# Patient Record
Sex: FEMALE | Race: WHITE | NOT HISPANIC OR LATINO | Employment: OTHER | URBAN - METROPOLITAN AREA
[De-identification: names, ages, dates, MRNs, and addresses within clinical notes are randomized per-mention and may not be internally consistent; named-entity substitution may affect disease eponyms.]

---

## 2017-02-10 DIAGNOSIS — I10 ESSENTIAL HYPERTENSION: ICD-10-CM

## 2017-02-10 RX ORDER — AMLODIPINE BESYLATE 5 MG/1
TABLET ORAL
Qty: 30 TABLET | Refills: 3 | Status: SHIPPED | OUTPATIENT
Start: 2017-02-10 | End: 2017-06-11 | Stop reason: SDUPTHER

## 2017-06-11 DIAGNOSIS — I10 ESSENTIAL HYPERTENSION: ICD-10-CM

## 2017-06-12 RX ORDER — LISINOPRIL AND HYDROCHLOROTHIAZIDE 12.5; 2 MG/1; MG/1
1 TABLET ORAL EVERY MORNING
Qty: 30 TABLET | Refills: 2 | Status: SHIPPED | OUTPATIENT
Start: 2017-06-12 | End: 2017-07-31 | Stop reason: SDUPTHER

## 2017-06-12 RX ORDER — AMLODIPINE BESYLATE 5 MG/1
TABLET ORAL
Qty: 30 TABLET | Refills: 2 | Status: SHIPPED | OUTPATIENT
Start: 2017-06-12 | End: 2017-07-31 | Stop reason: SDUPTHER

## 2017-07-31 ENCOUNTER — OFFICE VISIT (OUTPATIENT)
Dept: INTERNAL MEDICINE | Facility: CLINIC | Age: 52
End: 2017-07-31
Payer: COMMERCIAL

## 2017-07-31 VITALS
HEART RATE: 63 BPM | BODY MASS INDEX: 32.94 KG/M2 | DIASTOLIC BLOOD PRESSURE: 66 MMHG | OXYGEN SATURATION: 96 % | HEIGHT: 59 IN | SYSTOLIC BLOOD PRESSURE: 118 MMHG | WEIGHT: 163.38 LBS

## 2017-07-31 DIAGNOSIS — Z23 NEED FOR DIPHTHERIA-TETANUS-PERTUSSIS (TDAP) VACCINE: ICD-10-CM

## 2017-07-31 DIAGNOSIS — Z00.00 PREVENTATIVE HEALTH CARE: ICD-10-CM

## 2017-07-31 DIAGNOSIS — I10 ESSENTIAL HYPERTENSION: Primary | ICD-10-CM

## 2017-07-31 DIAGNOSIS — E66.9 NON MORBID OBESITY, UNSPECIFIED OBESITY TYPE: ICD-10-CM

## 2017-07-31 PROCEDURE — 99999 PR PBB SHADOW E&M-EST. PATIENT-LVL III: CPT | Mod: PBBFAC,,, | Performed by: INTERNAL MEDICINE

## 2017-07-31 PROCEDURE — 90471 IMMUNIZATION ADMIN: CPT | Mod: S$GLB,,, | Performed by: INTERNAL MEDICINE

## 2017-07-31 PROCEDURE — 90715 TDAP VACCINE 7 YRS/> IM: CPT | Mod: S$GLB,,, | Performed by: INTERNAL MEDICINE

## 2017-07-31 PROCEDURE — 99396 PREV VISIT EST AGE 40-64: CPT | Mod: 25,S$GLB,, | Performed by: INTERNAL MEDICINE

## 2017-07-31 RX ORDER — AMLODIPINE BESYLATE 5 MG/1
TABLET ORAL
Qty: 30 TABLET | Refills: 6 | Status: SHIPPED | OUTPATIENT
Start: 2017-07-31 | End: 2018-01-26 | Stop reason: SDUPTHER

## 2017-07-31 RX ORDER — LISINOPRIL AND HYDROCHLOROTHIAZIDE 12.5; 2 MG/1; MG/1
1 TABLET ORAL EVERY MORNING
Qty: 30 TABLET | Refills: 6 | Status: SHIPPED | OUTPATIENT
Start: 2017-07-31 | End: 2018-01-26 | Stop reason: SDUPTHER

## 2017-07-31 NOTE — PROGRESS NOTES
Subjective:       Patient ID: Natasha Espinal is a 51 y.o. female.    Chief Complaint: Hypertension (f/u)    HPI Pt. Here for f/u for HTN; she is compliant with meds; she would like tetanus shot; colonoscopy was 4 years ago as per pt. And does not want repeat at this time; she states she will research date of colonoscopy and recommended repeat interval and let me know; she needs refill on BP meds; weight is elevated but stable  Review of Systems   Constitutional: Negative for chills, fatigue and fever.   HENT: Negative for congestion, rhinorrhea and sore throat.    Respiratory: Negative for cough, shortness of breath and wheezing.    Cardiovascular: Negative for chest pain.   Gastrointestinal: Negative for abdominal pain, nausea and vomiting.   Genitourinary: Negative for dysuria.   Musculoskeletal: Negative for arthralgias.   Skin: Negative for rash.   Neurological: Negative for dizziness and headaches.   Psychiatric/Behavioral: Negative for sleep disturbance. The patient is not nervous/anxious.        Objective:      Physical Exam   Constitutional: She is oriented to person, place, and time.   obese   Eyes: EOM are normal.   Neck: Normal range of motion.   Cardiovascular: Normal rate, regular rhythm and normal heart sounds.    Pulmonary/Chest: Effort normal and breath sounds normal.   Abdominal: Soft. There is no tenderness. There is no rebound and no guarding.   Musculoskeletal: Normal range of motion.   Neurological: She is alert and oriented to person, place, and time.   Skin: No rash noted.       Assessment:       1. Essential hypertension Well controlled   2. Non morbid obesity, unspecified obesity type Sub-optimally controlled   3. Preventative health care Active   4. Need for diphtheria-tetanus-pertussis (Tdap) vaccine        Plan:         Essential hypertension  Comments:  continue current regimen and encouraged low Na diet and weight loss  Orders:  -     amlodipine (NORVASC) 5 MG tablet; TAKE 1 TABLET  (5 MG TOTAL) BY MOUTH EVERY EVENING.  Dispense: 30 tablet; Refill: 6  -     lisinopril-hydrochlorothiazide (PRINZIDE,ZESTORETIC) 20-12.5 mg per tablet; Take 1 tablet by mouth every morning.  Dispense: 30 tablet; Refill: 6  -     CBC auto differential; Future; Expected date: 12/31/2017  -     Comprehensive metabolic panel; Future; Expected date: 12/31/2017  -     Urinalysis; Future; Expected date: 12/31/2017    Non morbid obesity, unspecified obesity type  Comments:  encouraged diet and explained risks     Preventative health care  -     CBC auto differential; Future; Expected date: 12/31/2017  -     Comprehensive metabolic panel; Future; Expected date: 12/31/2017  -     Lipid panel; Future; Expected date: 12/31/2017  -     TSH; Future; Expected date: 12/31/2017  -     Urinalysis; Future; Expected date: 12/31/2017    Need for diphtheria-tetanus-pertussis (Tdap) vaccine  -     (In Office Administered) Tdap Vaccine

## 2017-08-21 DIAGNOSIS — Z12.11 COLON CANCER SCREENING: ICD-10-CM

## 2017-12-06 ENCOUNTER — TELEPHONE (OUTPATIENT)
Dept: RADIOLOGY | Facility: HOSPITAL | Age: 52
End: 2017-12-06

## 2017-12-06 ENCOUNTER — PATIENT MESSAGE (OUTPATIENT)
Dept: RADIOLOGY | Facility: HOSPITAL | Age: 52
End: 2017-12-06

## 2017-12-06 NOTE — TELEPHONE ENCOUNTER
Called patient in reference to her mammogram and scheduling patient for a breast biopsy here at the breast center. No answer. Left the patient a message with my direct phone number.

## 2017-12-06 NOTE — TELEPHONE ENCOUNTER
Spoke with patient. Reviewed breast biopsy procedure and reviewed instructions for breast biopsy. Patient expressed understanding and all questions were answered. Provided patient with my phone number to call for any further concerns or questions.   Patient scheduled breast biopsy at the [facility] [place] on 12/14/17

## 2017-12-14 ENCOUNTER — HOSPITAL ENCOUNTER (OUTPATIENT)
Dept: RADIOLOGY | Facility: HOSPITAL | Age: 52
Discharge: HOME | End: 2017-12-14
Attending: OBSTETRICS & GYNECOLOGY
Payer: COMMERCIAL

## 2017-12-14 DIAGNOSIS — N63.20 LEFT BREAST LUMP: ICD-10-CM

## 2017-12-14 DIAGNOSIS — R92.8 ABNORMAL MAMMOGRAM: ICD-10-CM

## 2017-12-14 PROCEDURE — 77062 BREAST TOMOSYNTHESIS BI: CPT | Mod: 26,,, | Performed by: RADIOLOGY

## 2017-12-14 PROCEDURE — 76642 ULTRASOUND BREAST LIMITED: CPT | Mod: 26,50,, | Performed by: RADIOLOGY

## 2017-12-14 PROCEDURE — 77066 DX MAMMO INCL CAD BI: CPT | Mod: 26,,, | Performed by: RADIOLOGY

## 2017-12-14 PROCEDURE — 77066 DX MAMMO INCL CAD BI: CPT | Mod: TC,PO

## 2017-12-14 PROCEDURE — 76642 ULTRASOUND BREAST LIMITED: CPT | Mod: TC,50,PO

## 2017-12-14 PROCEDURE — 27201068 US BREAST BIOPSY WITH IMAGING 1ST SITE LEFT: Mod: PO

## 2017-12-14 PROCEDURE — 88305 TISSUE EXAM BY PATHOLOGIST: CPT | Performed by: PATHOLOGY

## 2017-12-14 PROCEDURE — 88360 TUMOR IMMUNOHISTOCHEM/MANUAL: CPT | Mod: 26,,, | Performed by: PATHOLOGY

## 2017-12-14 PROCEDURE — 19083 BX BREAST 1ST LESION US IMAG: CPT | Mod: ,,, | Performed by: RADIOLOGY

## 2017-12-19 ENCOUNTER — TELEPHONE (OUTPATIENT)
Dept: SURGERY | Facility: CLINIC | Age: 52
End: 2017-12-19

## 2017-12-19 ENCOUNTER — TELEPHONE (OUTPATIENT)
Dept: HEMATOLOGY/ONCOLOGY | Facility: CLINIC | Age: 52
End: 2017-12-19

## 2017-12-19 NOTE — TELEPHONE ENCOUNTER
Returned call from patient, who had left me a voicemail. Discussed newly diagnosed breast cancer, and that the next step would be to meet with a breast surgeon. Patient would like to continue her care here at [facility]. She is requesting Dr. Emeterio Raymundo. Scheduled for 12/28 at 9:15 per patient request. Reviewed location of the [facility], encouraged patient to call if she needs anything prior to that appointment.

## 2017-12-19 NOTE — TELEPHONE ENCOUNTER
Called Dr. Tuttle's office to relay biopsy results showing invasive ductal carcinoma. Spoke to his nurse Fatimah, who stated that Dr. Tuttle will call patient and inform her of the diagnosis. Faxed pathology report, gave Fatimah my number and encouraged her to call if Dr. Tuttle wants her to be seen by any of our breast surgeons

## 2017-12-28 ENCOUNTER — OFFICE VISIT (OUTPATIENT)
Dept: SURGERY | Facility: CLINIC | Age: 52
End: 2017-12-28
Payer: COMMERCIAL

## 2017-12-28 VITALS
HEART RATE: 92 BPM | SYSTOLIC BLOOD PRESSURE: 143 MMHG | WEIGHT: 163 LBS | BODY MASS INDEX: 32.86 KG/M2 | DIASTOLIC BLOOD PRESSURE: 77 MMHG | TEMPERATURE: 98 F | HEIGHT: 59 IN

## 2017-12-28 DIAGNOSIS — Z17.0 MALIGNANT NEOPLASM OF UPPER-OUTER QUADRANT OF LEFT BREAST IN FEMALE, ESTROGEN RECEPTOR POSITIVE: Primary | ICD-10-CM

## 2017-12-28 DIAGNOSIS — Z17.0 MALIGNANT NEOPLASM OF OVERLAPPING SITES OF LEFT BREAST IN FEMALE, ESTROGEN RECEPTOR POSITIVE: Primary | ICD-10-CM

## 2017-12-28 DIAGNOSIS — C50.412 MALIGNANT NEOPLASM OF UPPER-OUTER QUADRANT OF LEFT BREAST IN FEMALE, ESTROGEN RECEPTOR POSITIVE: Primary | ICD-10-CM

## 2017-12-28 DIAGNOSIS — C50.812 MALIGNANT NEOPLASM OF OVERLAPPING SITES OF LEFT BREAST IN FEMALE, ESTROGEN RECEPTOR POSITIVE: Primary | ICD-10-CM

## 2017-12-28 PROCEDURE — 99245 OFF/OP CONSLTJ NEW/EST HI 55: CPT | Mod: S$GLB,,, | Performed by: SURGERY

## 2017-12-28 PROCEDURE — 99999 PR PBB SHADOW E&M-EST. PATIENT-LVL III: CPT | Mod: PBBFAC,,, | Performed by: SURGERY

## 2017-12-28 NOTE — LETTER
December 31, 2017      Darrell Tuttle MD  [address] [address]           [facility]  [address]  [facility] [address]  Phone: [phone]  Fax: [phone]          Patient: Natasha Espinal   MR Number: [id number]   YOB: 1965   Date of Visit: 12/28/2017       Dear Dr. Darrell Tuttle:    Thank you for referring Natasha Espinal to me for evaluation. Attached you will find relevant portions of my assessment and plan of care.    If you have questions, please do not hesitate to call me. I look forward to following Natasha Espinal along with you.    Sincerely,    Emeterio Raymundo MD    Enclosure  CC:  No Recipients    If you would like to receive this communication electronically, please contact [email or web address] or [phone] to request more information on [facility] access.    For providers and/or their staff who would like to refer a patient to [facility], please contact us through our one-stop-shop provider referral line, [facility], at [phone].    If you feel you have received this communication in error or would no longer like to receive these types of communications, please e-mail [email or web address]

## 2017-12-28 NOTE — PROGRESS NOTES
"New Breast Cancer  History and Physical  [facility]  Department of Surgery    REFERRING PROVIDER: Darrell Tuttle MD  [address] [facility]  [address] [place] [address]    CHIEF COMPLAINT: left breast cancer    Subjective:      Natasha Espinal is a 52 y.o. postmenopausal female referred for evaluation of recently diagnosed carcinoma of the left breast. The patient was initially referred for surgical evaluation of a breast lump on exam first noted late september. Follow-up mammogram and ultrasound (17) showed a 29 mm irregularly shaped mass seen in the upper outer quadrant of the left breast. BI-RADS 4. A ultrasound guided biopsy was performed on 17 with pathology revealing infiltrating ductal carcinoma of the breast.     Patient does not routinely do self breast exams.  Patient has noted a change on breast exam.  Patient denies nipple discharge. Patient denies to previous breast biopsy. Patient denies a personal history of breast cancer.  She reports having a lumpectomy at 17 year old to remove "scar tissue" that was a result from an accident. Pathology was reportedly benign.     Findings at that time were the following:   Tumor size: 3.2 cm   Tumor ndgndrndanddndend:nd nd2nd Estrogen Receptor: positive  Progesterone Receptor: positive  Her-2 tahir: FISH pending  Lymph node status: Clinically negative  Lymphatic invasion: N/A    GYN History:  Age of menarche was 15. Age of menopause was 46 via KLAUS BSO. Patient admits to hormonal therapy (ongoing since ). Patient is . Age of first live birth was 29. Patient did not breast feed.    FAMILY History:  PAunt: breast dx 40s  PAunt: breast dx 40s  PCousin: breast  40s  Paternal second cousin: breast 60s    Past Medical History:   Diagnosis Date    Hypertension      Past Surgical History:   Procedure Laterality Date    BREAST LUMPECTOMY      HYSTERECTOMY       Current Outpatient Prescriptions on File Prior to Visit   Medication Sig " "Dispense Refill    amlodipine (NORVASC) 5 MG tablet TAKE 1 TABLET (5 MG TOTAL) BY MOUTH EVERY EVENING. 30 tablet 6    estradiol (ESTRACE) 1 MG tablet Take 1 mg by mouth once daily.      lisinopril-hydrochlorothiazide (PRINZIDE,ZESTORETIC) 20-12.5 mg per tablet Take 1 tablet by mouth every morning. 30 tablet 6     No current facility-administered medications on file prior to visit.      Social History     Social History    Marital status:      Spouse name: N/A    Number of children: N/A    Years of education: N/A     Occupational History    Not on file.     Social History Main Topics    Smoking status: Never Smoker    Smokeless tobacco: Never Used    Alcohol use No    Drug use: No    Sexual activity: Not on file     Other Topics Concern    Not on file     Social History Narrative    No narrative on file     Family History   Problem Relation Age of Onset    Heart disease Father     Breast cancer Paternal Aunt     Breast cancer Paternal Aunt     Breast cancer Paternal Aunt         Review of Systems  Review of Systems   Constitutional: Negative.    HENT: Negative.    Respiratory: Negative.    Cardiovascular: Negative.    Gastrointestinal: Negative.    Neurological: Negative.    Psychiatric/Behavioral: Negative.         Objective:   PHYSICAL EXAM:  BP (!) 143/77 (BP Location: Right arm, Patient Position: Sitting, BP Method: Medium (Automatic))   Pulse 92   Temp 98 °F (36.7 °C) (Oral)   Ht 4' 11" (1.499 m)   Wt 73.9 kg (163 lb)   BMI 32.92 kg/m²     Physical Exam   Pulmonary/Chest: Right breast exhibits no inverted nipple, no mass, no nipple discharge, no skin change and no tenderness. Left breast exhibits mass. Left breast exhibits no inverted nipple, no nipple discharge, no skin change and no tenderness.             Radiology review: Images personally reviewed by me in the clinic.   Findings:   Computer-aided detection was utilized in the interpretation of this examination.  The breasts " are extremely dense, which lowers the sensitivity of mammography.      Left  Mammo Digital Diagnostic Bilat with CAD  There is architectural distortion seen in the upper outer quadrant of the left breast.      US Breast Left Limited  There is a 29 mm irregularly shaped mass seen in the upper outer quadrant of the left breast.      Right  Mammo Digital Diagnostic Bilat with CAD  There is no evidence of suspicious masses, calcifications, or other abnormal findings.     Impression:  Left  Architectural Distortion: Left breast architectural distortion at the upper outer position. Assessment: 4 - Suspicious finding. Biopsy is recommended.      Right  There is no mammographic or sonographic evidence of malignancy.     BI-RADS Category:   Overall: 4 - Suspicious      Assessment:      Natasha Espinal is a 52 y.o. postmenopausal female with recently diagnosed carcinoma of the left breast.      Plan:    Options for management were discussed with the patient and her family. We reviewed the existing data noting the equivalency of breast conserving surgery with radiation therapy and mastectomy. We also reviewed the guidelines of the National Comprehensive Cancer Network for Stage 2 breast carcinoma. We discussed the need for lumpectomy margins to be negative for carcinoma, the necessity for postoperative radiation therapy after breast conservation in most cases, the possibility of a failed or false negative sentinel lymph node biopsy and the potential need for complete lymphadenectomy for a failed or positive sentinel lymph node biopsy were fully discussed. In the setting of mastectomy, delayed or immediate reconstruction options are available and were discussed.     In the setting of lumpectomy, radiation therapy would be recommended majority of the time.  The duration and treatment side effects were discussed with the patient.  This will coordinated with the radiation oncologist pending final pathology.    We also  discussed the role of systemic therapy in the treatment of early stage breast cancer.  We discussed that this is based on tumor biology and mynor status and will be determined based on final pathology.  We discussed that if the cancer is hormone positive, endocrine therapy would be recommended in most cases and its use can reduce the risk of recurrence as well as improve survival. Side effects of treatment were briefly discussed. We also discussed the potential role for chemotherapy based on a number of factors such as tumor phenotype (ER+ vs. triple negative vs. Oep0yoa+) and this would be determined in coordination with the medical oncologist.    Patient was educated on breast cancer, receptors, wire localization lumpectomy, mastectomy, sentinel lymph node mapping and biopsy, axillary lymph node dissection, reconstruction, breast prosthesis with post-mastectomy bra and radiation therapy. Patient was given patient information binder including [facility] breast cancer treatment brochure.  All her questions were answered.  Discussed potential need for post mastectomy XRT.    We will await results of HER 2 to make a final recommendation. If HER2-, will try to obtain mammoprint   Will obtain bilateral breast MRI  Given strong family history, will refer to genetics  RTC on 1/9/17 to discuss results and finalize game plan moving forward    Total time spent with the patient: 60 minutes.  45 minutes of face to face consultation and 15 minutes of chart review and coordination of care.    I have personally taken the history and examined this patient and agree with the resident's note as stated above.    ADDENDUM:    Natasha Espinal is a very pleasant 52-year-old  female who   presents with her friend, Jessica, and her partner Momo for initial   consultation.  The patient initially felt a breast mass on self-breast   examination in the upper outer quadrant of the left breast.  She underwent   diagnostic workup as  noted above, which revealed approximately a 3-cm mass in   the upper outer 2 o'clock region of the left breast.  This was mobile without   fixation.  There were no suspicious skin changes and there was no fixation to   the chest wall.  There was no palpable lymphadenopathy.  On clinical breast   examination, the mass actually felt approximately 5 cm in size without   lymphadenopathy.  The core needle biopsy revealed that this was estrogen and   progesterone receptor positive.  It was a grade I tumor.  The HER-2/tahir status   is pending FISH analysis.  Her family history is as noted above with several   family members with a history of breast cancer.  Her breast size is a 38DD.    She presents today unsure about whether she wants breast conservation surgery or   mastectomy and she wanted to listen to all the options.  We discussed the   indications for post-lumpectomy radiation.  We discussed indications for   postmastectomy radiotherapy.  At this point, I do not think she is breast   conservation surgery appropriate given the size of the mass.  Certainly, we   would need to know the FISH status before making definitive recommendations.    Certainly, if her FISH reveals that this is HER-2/tahir positive, she will get   neoadjuvant preoperative primary upfront chemotherapy with Herceptin and Perjeta   based chemotherapy.  If this is HER-2/tahir negative, we would order a MammaPrint   for possibly Oncotype DX genomic analysis to see if this is someone who would   warrant and benefit from chemotherapy either neoadjuvantly or adjuvantly.  Given   the family history, we will refer her for a Genetics counseling appointment for   possible genetic testing given the multiple family members diagnosed at an   early age.  I have advised her to discontinue her hormone replacement therapy   and I have ordered a breast MRI to assess the size of this mass since clinically   it feels about 5 cm and on imaging, it was about 3 cm.    The  breast MRI has been ordered.  We have advised her to discontinue hormone   replacement therapy.  She will be set up for Genetics counseling for genetic   testing possibility based on the family history as noted above with our nurse   practitioner, Irene Ramires.  I will have to wait on ordering the MammaPrint   since we do not have the FISH result.  Certainly, if she is HER-2/tahir positive,   we will not need to order the MammaPrint and we typically cannot order it anyway   until we know the HER-2/tahir status.  At this point, she will follow up with me   on 01/09/2018, to discuss the results of her HER-2 result and hopefully at that   point, we will also have the breast MRI.  In the meantime, the patient will   discuss with her family and friends the options of breast conservation surgery   versus mastectomy.  Again, if she is highly motivated for breast conservation   surgery, I think we would need to use the neoadjuvant chemotherapy approach   given the clinical size of the mass, but at this point, she does not appear to   be leaning one way or the other and is unsure about breast conservation surgery   versus mastectomy.    Time spent with the patient today was greater than 60 minutes with greater than   50% of that time in counseling.            CODY/IN  dd: 12/31/2017 08:16:58 (CST)  td: 12/31/2017 09:59:18 (CST)  Doc ID   #[id number]  Job ID #[id number]    CC:     Job # [id number].

## 2017-12-28 NOTE — LETTER
[facility]  [address]  [facility] [address]  Phone: [phone]  Fax: [phone] December 31, 2017      Darrell Tuttle MD  [address] [address]    Patient: Natasha Espinal   MR Number: [id number]   YOB: 1965   Date of Visit: 12/28/2017     Dear Dr. Tuttle:    Thank you for referring Natasha Espinal to me for evaluation. Attached you will find relevant portions of my assessment and plan of care.    Natasha Espinal is a very pleasant 52-year-old  female who presents with her friend, Jessica, and her partner Momo for initial consultation.  The patient initially felt a breast mass on self-breast examination in the upper outer quadrant of the left breast.  She underwent diagnostic workup as noted above, which revealed approximately a 3-cm mass in the upper outer 2 o'clock region of the left breast.  This was mobile without fixation.  There were no suspicious skin changes and there was no fixation to the chest wall.  There was no palpable lymphadenopathy.      On clinical breast examination, the mass actually felt approximately 5 cm in size without lymphadenopathy.  The core needle biopsy revealed that this was estrogen and progesterone receptor positive.  It was a grade I tumor.  The HER-2/tahir status is pending FISH analysis.  Her family history, several family members with a history of breast cancer. Her breast size is a 38DD.    She presents today unsure about whether she wants breast conservation surgery or mastectomy and she wanted to listen to all the options.  We discussed the indications for post-lumpectomy radiation. We discussed indications for postmastectomy radiotherapy.  At this point, I do not think she is breast conservation surgery appropriate given the size of the mass.  Certainly, we would need to know the FISH status before making definitive recommendations.  Certainly, if her FISH reveals that this  is HER-2/tahir positive, she will get neoadjuvant preoperative primary upfront chemotherapy with Herceptin and Perjeta based chemotherapy.  If this is HER-2/tahir negative, we would order a MammaPrint for possibly Oncotype DX genomic analysis to see if this is someone who would warrant and benefit from chemotherapy either neoadjuvantly or adjuvantly. Given the family history, we will refer her for a Genetics counseling appointment for possible genetic testing given the multiple family members diagnosed at an early age.      I have advised her to discontinue her hormone replacement therapy and I have ordered a breast MRI to assess the size of this mass since clinically it feels about 5 cm and on imaging, it was about 3 cm.    The breast MRI has been ordered.  We have advised her to discontinue hormone replacement therapy.  She will be set up for Genetics counseling for genetic testing possibility based on the family history with our nurse practitioner, Irene Ramires.  I will have to wait on ordering the MammaPrint since we do not have the FISH result. Certainly, if she is HER-2/tahir positive, we will not need to order the MammaPrint and we typically cannot order it anyway until we know the HER-2/tahir status.      At this point, she will follow up with me on 01/09/2018, to discuss the results of her HER-2 result and hopefully at that point, we will also have the breast MRI.  In the meantime, the patient will discuss with her family and friends the options of breast conservation surgery versus mastectomy.  Again, if she is highly motivated for breast conservation surgery, I think we would need to use the neoadjuvant chemotherapy approach given the clinical size of the mass, but at this point, she does not appear to be leaning one way or the other and is unsure about breast conservation surgery versus mastectomy.    Time spent with the patient today was greater than 60 minutes with greater than 50% of that time in  counseling.    If you have questions, please do not hesitate to call me. I look forward to following Natasha Espinal along with you.    Sincerely,        Emeterio Raymundo MD  Medical Director, [facility]  Section of General and Oncologic Surgery  [facility]    RLC/hcr    CC  Bang Snowden MD

## 2018-01-03 ENCOUNTER — TELEPHONE (OUTPATIENT)
Dept: SURGERY | Facility: CLINIC | Age: 53
End: 2018-01-03

## 2018-01-03 NOTE — TELEPHONE ENCOUNTER
Reminder call to the patient regarding genetic counseling appointment.  The patient is scheduled to be seen on tomorrow Thursday 1/4/18 at 7:30 am.  The patient was instructed to fast 30 minutes before the appointment and to bring insurance card to the appointment.  The patient voiced understanding of appointment date, time, location, and instructions.

## 2018-01-04 ENCOUNTER — OFFICE VISIT (OUTPATIENT)
Dept: SURGERY | Facility: CLINIC | Age: 53
End: 2018-01-04
Payer: COMMERCIAL

## 2018-01-04 DIAGNOSIS — Z71.83 ENCOUNTER FOR NONPROCREATIVE GENETIC COUNSELING: Primary | ICD-10-CM

## 2018-01-04 DIAGNOSIS — C50.912 INVASIVE DUCTAL CARCINOMA OF BREAST, FEMALE, LEFT: ICD-10-CM

## 2018-01-04 DIAGNOSIS — Z80.3 FAMILY HISTORY OF BREAST CANCER: ICD-10-CM

## 2018-01-04 PROCEDURE — 99214 OFFICE O/P EST MOD 30 MIN: CPT | Mod: S$GLB,,, | Performed by: SURGERY

## 2018-01-04 NOTE — PROGRESS NOTES
Mrs Espinal presents for genetic counseling, referred by Dr Raymundo. She is a 52 year old female with recent diagnosis of IDC left breast, ER/WA +. See pedigree for full family history which will be scanned into Epic media.  This patient does not have a known hereditary cancer genetic mutation on either side of the family and does not have known Ashenazi Roman Catholic ancestry.      We reviewed her medical and family history and discussed the genetics of breast cancer, cancer risks associated with a hereditary predisposition to cancer, and the benefits, risks, and limitations of genetic testing according to current NCCN guidelines.  Discussed sporadic verses family clustering verses hereditary cancer. The patients history raises the possibility of a genetic susceptibility to breast cancer, with the two genes most strongly associated with early-onset breast cancer are BRCA1 and BRCA2. Mutations in these genes increase the risk for both breast and ovarian cancer in women and breast and early prostate cancer in men, along with an elevated risk of pancreatic cancer and melanoma. Due to significant clinical overlap in hereditary cancer susceptibility genes, a molecular diagnosis of a hereditary cancer condition is necessary to clarify the cancer risks this patient and multigene testing was discussed based on her history of malignancies reported today. Genetic testing for mutations in the BRCA1 and BRCA2 genes involves the complete sequencing of both BRCA1 and BRCA2, testing for 5 large gene rearrangement mutations in the BRCA1 gene, and the BRACAnalysis Large Rearrangement Test (CURT accounts for 6-10% of all mutations in HBOC). This testing is estimated to identify greater than 92% of mutations in the BRCA1 and BRCA2 genes.      We discussed possible results of genetic testing: positive result would mean that a mutation known to be associated with a higher risk of cancer was identified; negative result would mean that no  mutation known to increase the risk of cancer was identified; variant of uncertain significance (VUS) in which a genetic change was identified in a gene, but it is unclear if this change causes an increase in cancer risk normally associated with mutations in the gene. There is an estimated 1-2% chance of a reported variant of uncertain significance in BRCA1 and BRCA2 and up to a 30% with newer genes included in multigene panels. Due to the clinical uncertainty of this type of change, VUSs are not used to make medical management decisions for a patient. Finally, I advised the patient that her medical management may change based on these results and may include increased surveillance, use of chemoprevention, or prophylactic surgery. A tailored cancer prevention plan and implications of the patients test results for relatives will be reviewed once results are available.      Women who carry mutations in the BRCA genes have a 56%-87% risk to develop breast cancer and up to a 27%-44% risk to develop ovarian cancer during their lifetime. Women who carry a BRCA gene mutation also have a greater chance of developing a second primary breast cancer, up to 40%.  Men who carry a BRCA gene mutation have up to a 6% risk to develop breast cancer and an increased risk for early-onset prostate cancer (diagnosed under age 60).  Mutations in the BRCA2 gene have also been associated with an increased risk other types of cancer in both men and women in some families, most notably pancreatic cancer and melanoma.  We discussed cancer risks vary depending on the tumor suppressor gene in which a mutation arises. We reviewed that if she carries a mutation within an autosomal inherited gene, her children would have a 50% chance of having the same mutation, along with her siblings.      Discussed the cost of testing, various labs which can conduct genetic testing and potential insurance coverage. She desires to proceed with testing, she  expressed her understanding of the information discussed today and provided informed consent for testing.          RECOMMENDATIONS:                                                                1. Integrated BRACAnalysis with Ryan through [facility] [employer], results expected in approximately 2-3 weeks.   2. Post test counseling will be provided once results are available with healthcare management per results, including testing of any additional family members if pathogenic mutation is identified.     Time in counseling 45 min, total time 45 min

## 2018-01-05 ENCOUNTER — TELEPHONE (OUTPATIENT)
Dept: SURGERY | Facility: CLINIC | Age: 53
End: 2018-01-05

## 2018-01-05 DIAGNOSIS — C50.912 INFILTRATING DUCTAL CARCINOMA OF LEFT BREAST: Primary | ICD-10-CM

## 2018-01-05 NOTE — TELEPHONE ENCOUNTER
Returned the patient call regarding the message below.  The patient stated that her insurance company called her to inform her that the genetic testing has been denied and that a Ytgn-bc-Revq is needed to be done by Maira Melo.  Informed the patient that Maira is out of the office on today and will need to speak with Maira on Monday 1/8/18 regarding this matter.  The patient voiced understanding of this matter.  Upon further investigation I do not believe that the insurance company is talking about the genetic testing, but this is in reference to L-DEX screening that may have been denied by the insurance company.  Will await to speak with Maira on Monday regarding this matter to have better clarity of the matter.  Irene Ramires NP notified of this information.

## 2018-01-05 NOTE — TELEPHONE ENCOUNTER
----- Message from Dorothy Whitt sent at 1/5/2018  2:17 PM CST -----  Contact: Pt.Self   Pt.states she would like to speak with nurse in reference to her genetic testing approval and questions about her ins.company that called her please call PtGianni Back @ [phone]   Thank You :)

## 2018-01-08 ENCOUNTER — TELEPHONE (OUTPATIENT)
Dept: SURGERY | Facility: CLINIC | Age: 53
End: 2018-01-08

## 2018-01-08 ENCOUNTER — HOSPITAL ENCOUNTER (OUTPATIENT)
Dept: RADIOLOGY | Facility: HOSPITAL | Age: 53
Discharge: HOME | End: 2018-01-08
Attending: SURGERY
Payer: COMMERCIAL

## 2018-01-08 DIAGNOSIS — C50.812 MALIGNANT NEOPLASM OF OVERLAPPING SITES OF LEFT BREAST IN FEMALE, ESTROGEN RECEPTOR POSITIVE: ICD-10-CM

## 2018-01-08 DIAGNOSIS — Z17.0 MALIGNANT NEOPLASM OF OVERLAPPING SITES OF LEFT BREAST IN FEMALE, ESTROGEN RECEPTOR POSITIVE: ICD-10-CM

## 2018-01-08 PROCEDURE — A9577 INJ MULTIHANCE: HCPCS | Performed by: SURGERY

## 2018-01-08 PROCEDURE — 0159T MRI BREAST BILATERAL: CPT | Mod: TC

## 2018-01-08 PROCEDURE — 77059 MRI BREAST BILATERAL: CPT | Mod: 26,,, | Performed by: STUDENT IN AN ORGANIZED HEALTH CARE EDUCATION/TRAINING PROGRAM

## 2018-01-08 PROCEDURE — 0159T MRI BREAST BILATERAL: CPT | Mod: 26,,, | Performed by: STUDENT IN AN ORGANIZED HEALTH CARE EDUCATION/TRAINING PROGRAM

## 2018-01-08 PROCEDURE — 25500020 PHARM REV CODE 255: Performed by: SURGERY

## 2018-01-08 RX ADMIN — GADOBENATE DIMEGLUMINE 16 ML: 529 INJECTION, SOLUTION INTRAVENOUS at 07:01

## 2018-01-08 NOTE — TELEPHONE ENCOUNTER
Contacted the patient regarding information about genetic testing that was done on last week.  Per the patient she received a call from her insurance company stating the testing was denied.  The patient did not answer, message left for the patient to contact our office regarding this matter.

## 2018-01-08 NOTE — TELEPHONE ENCOUNTER
The patient returned my call regarding information about insurance denial for genetic testing.  Informed the patient that the insurance denial she received a call about on last week was not regarding her genetic test, but a test that was order by .  Informed the patient that Christina Xavier RN Nurse Navigator will discuss more details with her on tomorrow at her appointment with .  The patient voiced understanding of this information.  Irene Ramires NP and Christina Xavier RN notified of this information.

## 2018-01-09 ENCOUNTER — OFFICE VISIT (OUTPATIENT)
Dept: SURGERY | Facility: CLINIC | Age: 53
End: 2018-01-09
Payer: COMMERCIAL

## 2018-01-09 VITALS
HEIGHT: 59 IN | SYSTOLIC BLOOD PRESSURE: 132 MMHG | BODY MASS INDEX: 32.96 KG/M2 | HEART RATE: 84 BPM | TEMPERATURE: 98 F | WEIGHT: 163.5 LBS | DIASTOLIC BLOOD PRESSURE: 82 MMHG

## 2018-01-09 DIAGNOSIS — Z91.89 AT RISK FOR LYMPHEDEMA: Primary | ICD-10-CM

## 2018-01-09 DIAGNOSIS — C50.412 PRIMARY CANCER OF UPPER OUTER QUADRANT OF LEFT BREAST: Primary | ICD-10-CM

## 2018-01-09 PROCEDURE — 99999 PR PBB SHADOW E&M-EST. PATIENT-LVL III: CPT | Mod: PBBFAC,,, | Performed by: SURGERY

## 2018-01-09 PROCEDURE — 99214 OFFICE O/P EST MOD 30 MIN: CPT | Mod: S$GLB,,, | Performed by: SURGERY

## 2018-01-09 NOTE — LETTER
[facility]  [address]  [facility] [address]  Phone: [phone]  Fax: [phone] January 10, 2018      Darrell Tuttle MD  [address] [address]    Patient: Natasha Espinal   MR Number: [id number]   YOB: 1965   Date of Visit: 1/9/2018     Dear Dr. Tuttle:    Thank you for referring Natasha Espinal to me for evaluation. Attached you will find relevant portions of my assessment and plan of care.    Natasha Espinal is a very pleasant 52-year-old  female who presents again with her friend, Jessica and her partner, Momo, for followup clinic encounter today, 01/09/2018.  The results of her HER-2/tahir analysis was negative; therefore, a MammaPrint has been sent off.  I had seen the patient walking out of clinic last week and with the negative HER-2/tahir result, we had already begun the process of sending off her previous core needle biopsy for the MammaPrint results.  The patient subsequently had her breast MRI, which revealed the 3.2 cm mass in greatest dimension in the upper outer quadrant of the left breast.  Laterally in the 3 o'clock position, there was another 9 mm highly suspicious mass.  These two areas were  by a distance of 2.3 cm, making the total area that would need to be resected, approximately 6.4 cm in diameter.  I discussed with her that if she should opt for breast conservation surgery attempt that the second lesion would need to be biopsied.      The patient has already made a decision that she desires mastectomy rather than breast conservation surgery.  She has undergone genetic testing and the results are currently pending.  She had seen our nurse practitioner, Irene Ramires, last week and the genetic testing result has been drawn and the result is pending. She does desire reconstruction.  She does desire breast size reduction as well when she has the mastectomy.  The patient states she has  SeatSwapr and she has sought consultation at [facility] with surgical consult set up and pending there.  She is strongly considering bilateral mastectomies and she is interested in bilateral nipple-sparing mastectomy, which would be appropriate given the appearance on the MRI.      At this point, the patient has been counseled on bilateral nipple-sparing mastectomy with left axillary sentinel lymph node biopsy.  Since there were no abnormalities on the right side, we do not need to perform sentinel lymph node biopsy on the right.  Since she has a 38 DD breast, she will likely have an incision performed in the radial vertical position at the 6 o'clock region due to her underlying 38 DD breasts with ptosis extending from the inferior areola down to the inframammary fold.  We will also set the patient up for a nutrition consult at the [facility] to discuss dietary recommendations and counseling in the cancer patient. She is setting up her appointment at [facility], but we also gave her other information regarding plastic surgery reconstruction with autologous tissue, SHRUTHI flaps, which is her preference, rather than implant reconstruction.      Approximate time spent with the patient and her companions today was 45 minutes with greater than 50% of this time in counseling again. Followup with me pending Plastic Surgery evaluation for surgical scheduling for anticipated likely bilateral nipple-sparing mastectomy with left-sided sentinel lymph node biopsy, possible left-sided axillary dissection.  This will certainly depend on her genetic test result, but she is strongly considering bilateral mastectomies either way.    If you have questions, please do not hesitate to call me. I look forward to following Natasha Espinal along with you.    Sincerely,        Emeterio Raymundo MD  Medical Director, [facility]  Section of General and Oncologic Surgery  [facility]    RLC/hcr    CC  Bang Snowden MD

## 2018-01-09 NOTE — LETTER
[facility]  [address]  [place] [address]  Phone: [phone]  Fax: [phone] January 10, 2018      Laz Saleh Jr., MD  [address] [facility]  [place] [address]    Patient: Natasha Espinal   MR Number: [id number]   YOB: 1965   Date of Visit: 1/9/2018     Dear Dr. Saleh:    Thank you for referring Natasha Espinal to me for evaluation. Attached you will find relevant portions of my assessment and plan of care.    Natasha Espinal is a very pleasant 52-year-old  female who presents with her friend, Jessica, and her partner Momo for initial consultation.  The patient initially felt a breast mass on self-breast examination in the upper outer quadrant of the left breast.  She underwent diagnostic workup as noted above, which revealed approximately a 3-cm mass in the upper outer 2 o'clock region of the left breast.  This was mobile without fixation.  There were no suspicious skin changes and there was no fixation to the chest wall.  There was no palpable lymphadenopathy.      On clinical breast examination, the mass actually felt approximately 5 cm in size without lymphadenopathy.  The core needle biopsy revealed that this was estrogen and progesterone receptor positive.  It was a grade I tumor.  The HER-2/tahir status is pending FISH analysis.  Her family history is as noted above with several family members with a history of breast cancer.  Her breast size is a 38DD.    She presents today unsure about whether she wants breast conservation surgery or mastectomy and she wanted to listen to all the options.  We discussed the indications for post-lumpectomy radiation. We discussed indications for postmastectomy radiotherapy.      At this point, I do not think she is breast conservation surgery appropriate given the size of the mass.  Certainly, we would need to know the FISH status before making  definitive recommendations. Certainly, if her FISH reveals that this is HER-2/tahir positive, she will get neoadjuvant preoperative primary upfront chemotherapy with Herceptin and Perjeta based chemotherapy.  If this is HER-2/tahir negative, we would order a MammaPrint for possibly Oncotype DX genomic analysis to see if this is someone who would warrant and benefit from chemotherapy either neoadjuvantly or adjuvantly. Given the family history, we will refer her for a Genetics counseling appointment for possible genetic testing given the multiple family members diagnosed at an early age.  I have advised her to discontinue her hormone replacement therapy and I have ordered a breast MRI to assess the size of this mass since clinically it feels about 5 cm and on imaging, it was about 3 cm.    The breast MRI has been ordered.  We have advised her to discontinue hormone replacement therapy.  She will be set up for Genetics counseling for genetic testing possibility based on the family history as noted above with our nurse practitioner, Irene Ramires.  I will have to wait on ordering the MammaPrint since we do not have the FISH result.  Certainly, if she is HER-2/tahir positive, we will not need to order the MammaPrint and we typically cannot order it anyway until we know the HER-2/tahir status.      At this point, she will follow up with me on 01/09/2018, to discuss the results of her HER-2 result and hopefully at that point, we will also have the breast MRI.  In the meantime, the patient will discuss with her family and friends the options of breast conservation surgery versus mastectomy.  Again, if she is highly motivated for breast conservation surgery, I think we would need to use the neoadjuvant chemotherapy approach given the clinical size of the mass, but at this point, she does not appear to be leaning one way or the other and is unsure about breast conservation surgery versus mastectomy.    Patient's note from  previous visit reviewed as per above.  The patient's Dyn1sgu status was negative so a Mammaprint has been sent off.    If you have questions, please do not hesitate to call me. I look forward to following Natasha Espinal along with you.    Sincerely,      Emeterio Raymundo MD  Medical Director, [facility]  Section of General and Oncologic Surgery  [facility]    RLC/hcr    CC  Yogi Sotelo MD

## 2018-01-10 PROBLEM — C50.412 PRIMARY CANCER OF UPPER OUTER QUADRANT OF LEFT BREAST: Status: ACTIVE | Noted: 2018-01-10

## 2018-01-10 NOTE — PROGRESS NOTES
"REFERRING PROVIDER: Darrell Tuttle MD  [address] [facility]  [address] [place] [address]     CHIEF COMPLAINT: left breast cancer     Subjective:       Natasha Espinal is a 52 y.o. postmenopausal female referred for evaluation of recently diagnosed carcinoma of the left breast. The patient was initially referred for surgical evaluation of a breast lump on exam first noted late september. Follow-up mammogram and ultrasound (17) showed a 29 mm irregularly shaped mass seen in the upper outer quadrant of the left breast. BI-RADS 4. A ultrasound guided biopsy was performed on 17 with pathology revealing infiltrating ductal carcinoma of the breast.      Patient does not routinely do self breast exams.  Patient has noted a change on breast exam.  Patient denies nipple discharge. Patient denies to previous breast biopsy. Patient denies a personal history of breast cancer.  She reports having a lumpectomy at 17 year old to remove "scar tissue" that was a result from an accident. Pathology was reportedly benign.      Findings at that time were the following:   Tumor size: 3.2 cm   Tumor ndgndrndanddndend:nd nd2nd Estrogen Receptor: positive  Progesterone Receptor: positive  Her-2 tahir: FISH pending  Lymph node status: Clinically negative  Lymphatic invasion: N/A     GYN History:  Age of menarche was 15. Age of menopause was 46 via KLAUS BSO. Patient admits to hormonal therapy (ongoing since ). Patient is . Age of first live birth was 29. Patient did not breast feed.     FAMILY History:  PAunt: breast dx 40s  PAunt: breast dx 40s  PCousin: breast  40s  Paternal second cousin: breast 60s          Past Medical History:   Diagnosis Date    Hypertension              Past Surgical History:   Procedure Laterality Date    BREAST LUMPECTOMY   1983    HYSTERECTOMY                Current Outpatient Prescriptions on File Prior to Visit   Medication Sig Dispense Refill    amlodipine (NORVASC) 5 MG " "tablet TAKE 1 TABLET (5 MG TOTAL) BY MOUTH EVERY EVENING. 30 tablet 6    estradiol (ESTRACE) 1 MG tablet Take 1 mg by mouth once daily.        lisinopril-hydrochlorothiazide (PRINZIDE,ZESTORETIC) 20-12.5 mg per tablet Take 1 tablet by mouth every morning. 30 tablet 6      No current facility-administered medications on file prior to visit.       Social History   Social History            Social History    Marital status:        Spouse name: N/A    Number of children: N/A    Years of education: N/A          Occupational History    Not on file.           Social History Main Topics    Smoking status: Never Smoker    Smokeless tobacco: Never Used    Alcohol use No    Drug use: No    Sexual activity: Not on file           Other Topics Concern    Not on file          Social History Narrative    No narrative on file               Family History   Problem Relation Age of Onset    Heart disease Father      Breast cancer Paternal Aunt      Breast cancer Paternal Aunt      Breast cancer Paternal Aunt           Review of Systems  Review of Systems   Constitutional: Negative.    HENT: Negative.    Respiratory: Negative.    Cardiovascular: Negative.    Gastrointestinal: Negative.    Neurological: Negative.    Psychiatric/Behavioral: Negative.          Objective:   PHYSICAL EXAM:  BP (!) 143/77 (BP Location: Right arm, Patient Position: Sitting, BP Method: Medium (Automatic))   Pulse 92   Temp 98 °F (36.7 °C) (Oral)   Ht 4' 11" (1.499 m)   Wt 73.9 kg (163 lb)   BMI 32.92 kg/m²      Physical Exam   Pulmonary/Chest: Right breast exhibits no inverted nipple, no mass, no nipple discharge, no skin change and no tenderness. Left breast exhibits mass. Left breast exhibits no inverted nipple, no nipple discharge, no skin change and no tenderness.                Radiology review: Images personally reviewed by me in the clinic.   Findings:   Computer-aided detection was utilized in the interpretation of this " examination.  The breasts are extremely dense, which lowers the sensitivity of mammography.      Left  Mammo Digital Diagnostic Bilat with CAD  There is architectural distortion seen in the upper outer quadrant of the left breast.      US Breast Left Limited  There is a 29 mm irregularly shaped mass seen in the upper outer quadrant of the left breast.      Right  Mammo Digital Diagnostic Bilat with CAD  There is no evidence of suspicious masses, calcifications, or other abnormal findings.     Impression:  Left  Architectural Distortion: Left breast architectural distortion at the upper outer position. Assessment: 4 - Suspicious finding. Biopsy is recommended.      Right  There is no mammographic or sonographic evidence of malignancy.     BI-RADS Category:   Overall: 4 - Suspicious        Assessment:       Natasha Espinal is a 52 y.o. postmenopausal female with recently diagnosed carcinoma of the left breast.      Plan:    Options for management were discussed with the patient and her family. We reviewed the existing data noting the equivalency of breast conserving surgery with radiation therapy and mastectomy. We also reviewed the guidelines of the National Comprehensive Cancer Network for Stage 2 breast carcinoma. We discussed the need for lumpectomy margins to be negative for carcinoma, the necessity for postoperative radiation therapy after breast conservation in most cases, the possibility of a failed or false negative sentinel lymph node biopsy and the potential need for complete lymphadenectomy for a failed or positive sentinel lymph node biopsy were fully discussed. In the setting of mastectomy, delayed or immediate reconstruction options are available and were discussed.      In the setting of lumpectomy, radiation therapy would be recommended majority of the time.  The duration and treatment side effects were discussed with the patient.  This will coordinated with the radiation oncologist pending final  pathology.     We also discussed the role of systemic therapy in the treatment of early stage breast cancer.  We discussed that this is based on tumor biology and mynor status and will be determined based on final pathology.  We discussed that if the cancer is hormone positive, endocrine therapy would be recommended in most cases and its use can reduce the risk of recurrence as well as improve survival. Side effects of treatment were briefly discussed. We also discussed the potential role for chemotherapy based on a number of factors such as tumor phenotype (ER+ vs. triple negative vs. Bwx9ldy+) and this would be determined in coordination with the medical oncologist.     Patient was educated on breast cancer, receptors, wire localization lumpectomy, mastectomy, sentinel lymph node mapping and biopsy, axillary lymph node dissection, reconstruction, breast prosthesis with post-mastectomy bra and radiation therapy. Patient was given patient information binder including [facility] breast cancer treatment brochure.  All her questions were answered.  Discussed potential need for post mastectomy XRT.     We will await results of HER 2 to make a final recommendation. If HER2-, will try to obtain mammoprint   Will obtain bilateral breast MRI  Given strong family history, will refer to genetics  RTC on 1/9/17 to discuss results and finalize game plan moving forward     Total time spent with the patient: 60 minutes.  45 minutes of face to face consultation and 15 minutes of chart review and coordination of care.     I have personally taken the history and examined this patient and agree with the resident's note as stated above.     ADDENDUM:     Natasha Espinal is a very pleasant 52-year-old  female who   presents with her friend, Jessica, and her partner Momo for initial   consultation.  The patient initially felt a breast mass on self-breast   examination in the upper outer quadrant of the left breast.  She  underwent   diagnostic workup as noted above, which revealed approximately a 3-cm mass in   the upper outer 2 o'clock region of the left breast.  This was mobile without   fixation.  There were no suspicious skin changes and there was no fixation to   the chest wall.  There was no palpable lymphadenopathy.  On clinical breast   examination, the mass actually felt approximately 5 cm in size without   lymphadenopathy.  The core needle biopsy revealed that this was estrogen and   progesterone receptor positive.  It was a grade I tumor.  The HER-2/tahir status   is pending FISH analysis.  Her family history is as noted above with several   family members with a history of breast cancer.  Her breast size is a 38DD.     She presents today unsure about whether she wants breast conservation surgery or   mastectomy and she wanted to listen to all the options.  We discussed the   indications for post-lumpectomy radiation.  We discussed indications for   postmastectomy radiotherapy.  At this point, I do not think she is breast   conservation surgery appropriate given the size of the mass.  Certainly, we   would need to know the FISH status before making definitive recommendations.    Certainly, if her FISH reveals that this is HER-2/tahir positive, she will get   neoadjuvant preoperative primary upfront chemotherapy with Herceptin and Perjeta   based chemotherapy.  If this is HER-2/tahir negative, we would order a MammaPrint   for possibly Oncotype DX genomic analysis to see if this is someone who would   warrant and benefit from chemotherapy either neoadjuvantly or adjuvantly.  Given   the family history, we will refer her for a Genetics counseling appointment for   possible genetic testing given the multiple family members diagnosed at an   early age.  I have advised her to discontinue her hormone replacement therapy   and I have ordered a breast MRI to assess the size of this mass since clinically   it feels about 5 cm and on  imaging, it was about 3 cm.     The breast MRI has been ordered.  We have advised her to discontinue hormone   replacement therapy.  She will be set up for Genetics counseling for genetic   testing possibility based on the family history as noted above with our nurse   practitioner, Irene Ramires.  I will have to wait on ordering the MammaPrint   since we do not have the FISH result.  Certainly, if she is HER-2/tahir positive,   we will not need to order the MammaPrint and we typically cannot order it anyway   until we know the HER-2/tahir status.  At this point, she will follow up with me   on 01/09/2018, to discuss the results of her HER-2 result and hopefully at that   point, we will also have the breast MRI.  In the meantime, the patient will   discuss with her family and friends the options of breast conservation surgery   versus mastectomy.  Again, if she is highly motivated for breast conservation   surgery, I think we would need to use the neoadjuvant chemotherapy approach   given the clinical size of the mass, but at this point, she does not appear to   be leaning one way or the other and is unsure about breast conservation surgery   versus mastectomy.     Patient's note's from previous visit reviewed as per above.  The patient's Rfl2hgl status was negative so a Mammaprint has been sent off.  SUBJECTIVE:  Natasha Espinal is a very pleasant 52-year-old  female   who presents again with her friend, Jessica and her partner, Momo, for followup   clinic encounter today, 01/09/2018.  The results of her HER-2/tahir analysis was   negative; therefore, a MammaPrint has been sent off.  I had seen the patient   walking out of clinic last week and with the negative HER-2/tahir result, we had   already begun the process of sending off her previous core needle biopsy for the   MammaPrint results.  The patient subsequently had her breast MRI, which   revealed the 3.2 cm mass in greatest dimension in the upper outer  quadrant of   the left breast.  Laterally in the 3 o'clock position, there was another 9 mm   highly suspicious mass.  These two areas were  by a distance of 2.3 cm,   making the total area that would need to be resected, approximately 6.4 cm in   diameter.  I discussed with her that if she should opt for breast conservation   surgery attempt that the second lesion would need to be biopsied.  The patient   has already made a decision that she desires mastectomy rather than breast   conservation surgery.  She has undergone genetic testing and the results are   currently pending.  She had seen our nurse practitioner, Irene Ramires, last   week and the genetic testing result has been drawn and the result is pending.    She does desire reconstruction.  She does desire breast size reduction as well   when she has the mastectomy.  The patient states she has [employer] insurance and   she has sought consultation at [facility] with surgical   consult set up and pending there.  She is strongly considering bilateral   mastectomies and she is interested in bilateral nipple-sparing mastectomy, which   would be appropriate given the appearance on the MRI.  At this point, the   patient has been counseled on bilateral nipple-sparing mastectomy with left   axillary sentinel lymph node biopsy.  Since there were no abnormalities on the   right side, we do not need to perform sentinel lymph node biopsy on the right.    Since she has a 38 DD breast, she will likely have an incision performed in the   radial vertical position at the 6 o'clock region due to her underlying 38 DD   breasts with ptosis extending from the inferior areola down to the inframammary   fold.  We will also set the patient up for a [facility] to discuss dietary recommendations and counseling in the cancer patient.    She is setting up her appointment at [place], but we also gave her other   information  regarding plastic surgery reconstruction with autologous tissue,   SHRUTHI flaps, which is her preference, rather than implant reconstruction.    Approximate time spent with the patient and her companions today was 45 minutes   with greater than 50% of this time in counseling again.  Followup with me   pending Plastic Surgery evaluation for surgical scheduling for anticipated   likely bilateral nipple-sparing mastectomy with left-sided sentinel lymph node   biopsy, possible left-sided axillary dissection.  This will certainly depend on   her genetic test result, but she is strongly considering bilateral mastectomies   either way.    Addendum:  I called the patient on 1-17-18 and left her a voice mail message letting her know that her Mammaprint result came back as LOW risk.    RLC/HN  dd: 01/10/2018 07:27:29 (CST)  td: 01/10/2018 23:35:29 (CST)  Doc ID   #[id number]  Job ID #[id number]    CC:     Job # [id number].

## 2018-01-19 ENCOUNTER — CLINICAL SUPPORT (OUTPATIENT)
Dept: REHABILITATION | Facility: HOSPITAL | Age: 53
End: 2018-01-19
Attending: SURGERY
Payer: COMMERCIAL

## 2018-01-19 DIAGNOSIS — Z91.89 AT RISK FOR LYMPHEDEMA: ICD-10-CM

## 2018-01-19 DIAGNOSIS — C50.412 PRIMARY CANCER OF UPPER OUTER QUADRANT OF LEFT BREAST: Primary | ICD-10-CM

## 2018-01-19 PROCEDURE — 97162 PT EVAL MOD COMPLEX 30 MIN: CPT | Mod: PO | Performed by: PHYSICAL MEDICINE & REHABILITATION

## 2018-01-19 NOTE — PATIENT INSTRUCTIONS
PRE/POST OP PATIENT EDUCATION    Post Operative Instructions     Range of Motion/lifting Precautions post surgery  The following activities should be avoided until your drain(s) have been removed  - do not lift affected arm above 90 degrees of shoulder flexion  - do not lift over 5 lbs  - do not pull or push heavy objects  - do not sleep on your stomach or surgery side       After surgery, you may begin self-care tasks including grooming, dressing, feeding and simple hygiene as soon as you feel up to it.    Schedule your post-op therapy follow-up after your drains have been removed     When to call your doctor   - if any part of your affected arm or axilla feels hot, is reddened or has increased swelling   - if you develop a temperature over 101 degrees Fahrenheit      Lymphedema - Identification and Prevention     Lymphedema - is the swelling of a body area or extremity caused by the accumulation of lymphatic fluid.  There is a risk for lymphedema with the removal of lymph nodes, trauma or radiation therapy.  Treatment of breast cancer often involves surgery: mastectomy or lumpectomy. Some of the lymph nodes in the underarm (called axillary lymph nodes) may be removed and checked to see if they contain cancer cells.     During breast surgery when axillary lymph nodes are removed (with sentinel node biopsy or axillary dissection) or are treated with radiation therapy, the lymphatic system may become impaired. This may prevent lymphatic fluid from leaving the area therefore, causing lymphedema.     Lymphatic fluid is a normal part of the circulatory system. Its function is to remove waste products and to produce cells vital to fighting infection. Swelling occurs when the vessels become restricted and the lymphatic fluid is unable to freely flow through them.  If lymphedema is left untreated, the affected limb could progressively become more swollen, which could lead to hardening  of the skin, bulkiness in the limb, infection and impaired wound healing.         There are things you can do to decrease the chance of developing lymphedema.                                          [email or web address]                                                                                                                                                  The information presented is intended for general information and educational purposes. It is not intended to replace the  advice of your health care provider. Contact your health care provider if you believe you have a health problem.                                                    POST OP EXERCISES - SAFE TO DO THE FIRST 2 WEEKS AFTER SURGERY UNTIL YOUR FOLLOW UP APPOINTMENT WITH PHYSICAL/OCCUPATIONAL THERAPY    Scapular Retraction (Standing)    With arms at sides, squeeze shoulder blades together. Do not shrug shoulders and do not hold your breath. Hold 5 seconds. Repeat 15 times 3 sessions 1-2 x day.       Exaggerated Breathing and Relaxation      Practice deep breathing frequently in the first few days following surgery even before you begin exercising. This exercise helps with tissue extensibility in the chest wall.  Inhale slowly and deeply through the nose and exhale through pursed lips. Concentrate on relaxing as you let the air out of your lungs. Repeat three (3) to four (4) times, remembering to breath in deeply and then relaxing. This exercise helps to ease the sensation of pulling and discomfort that may be experienced while exercising.      Ball Squeeze OR Hand pumps       Perform this exercise three (3) times a day for 1-3 minutes each time.    The ball squeeze or hand pumps helps to prevent or reduce temporary swelling that may occur in the affected arm. This exercise may be performed standing, sitting or while lying in bed. During this exercise the affected arm should be slightly bent and held upward. Support your arm with a  pillow if you are uncomfortable holding it up.    a. Hold a rubber ball in your hand on the affected side and lift your arm upward.  b. Alternate squeezing and relaxing the ball.      Pendulum Swing      Perform this exercise (2) times a day with ten (10) repetitions.    a. Rest your other hand on the back of a chair or table to steady yourself. Bend forward at the wrist with the involved arm hanging freely toward the floor.  b. Gently swing the involved arm forward and backward, gradually increasing the size of the swing.  c. Next, gently swing the involved arm side to side, gradually increasing the size of the swing.  d. Then make small circles with the involved arm and gradually increase the size of the [place]. Repeat making circles in the opposite direction beginning with small circles and gradually increasing the [place] size.          AROM: Elbow Flexion / Extension        With left hand palm up, gently bend elbow as far as possible. Then straighten arm as far as possible. Do this in standing.   Repeat 15 times per set. Do 3 sets per session. Do 1-3 sessions per day.

## 2018-01-19 NOTE — PROGRESS NOTES
OUTPATIENT PHYSICAL THERAPY  PHYSICAL THERAPY EVALUATION    Name: Natasha Espinal  Clinic Number: [id number]    Diagnosis:   Encounter Diagnoses   Name Primary?    At risk for lymphedema     Primary cancer of upper outer quadrant of left breast Yes     Physician: Emeterio Raymundo MD  Treatment Orders: PT Eval and Treat  Past Medical History:   Diagnosis Date    Hypertension      Past Surgical History:   Procedure Laterality Date    BREAST LUMPECTOMY  1983    HYSTERECTOMY  2011        Evaluation Date: 1/19/18  Visit # authorized: 1/60  Authorization period: 12/31/18  Plan of care Expiration: 1/19/18  Insurance: [employer]     Physician: Emeterio Raymundo MD  Precautions: None    Time In: 10:00 AM  Time out: 10:45 AM  1:1 treatment time: 45 minutes  History   History of Present Illness: Natasha is a 52 y.o. female that presents to  [facility] at the [facility] secondary to dx of left breast cancer.    Dx: IDC left breast, ER/MN +, Grade I, HER-2 FISH pending  Surgery date: Not yet schedule. Having bilateral mastectomy with SHRUTHI flap reconstruction      Pt presents today for to perform baseline measurements pre-surgery to be able to detect lymphedema post surgery, UE muscle testing, postural and ROM assessment along with education of risk of lymphedema and surgical precautions post surgery. Circumferential measurements will also be taken pre-surgery of BL UEs for early detection of lymphedema post surgery. Pt will also be instructed in exercises to perform pre-surgery to insure best outcomes post surgery.     Pt presents today for baseline measurements to aid in the early detection of lymphedema, UE muscle testing, postural and ROM assessment along with education of risk of lymphedema and surgical precautions post surgery. Circumferential measurements will be taken today of BL UEs for early detection of lymphedema post surgery. Pt will also be instructed in  exercises to perform pre-surgery to insure best outcomes.         Hand dominance: right hand dominant  Prior Therapy: None  Nutrition:  Overweight  Social History: Lives with family  Place of Residence (Steps/Adaptations): no steps  Current functional status:  Independent with ADL's  Exercise routine prior to onset : Body Pump 2 x /week and walking  DME owned: none  Work:  Teacher--on [email or web address]                       Job description includes:  standing      Subjective   Pt states: not having any pain or discomfor at this time  Pain: 0/10 on VAS.     Objective   Mental status :alert  - Follows commands: 100% of time   - Speech: no deficits   - Mood/behavior: calm, behavior appropriate to situation   Mental status   - Memory - Intact recent and remote   - Attention/concentration - Normal months-of-year backwards, vigilant during exam   - Language - Naming & repetition intact, +2-step commands   - Fund of knowledge - Aware of current events   - Executive - Well-organized thoughts     Posture/Alignment   Postural examination/scapula alignment: forward head and forward shoulders  Joint integrity: WFLs  Skin integrity: intact  Edema: none noted    Sensation: Light Touch: Intact       Proprioception: Intact  - appearance: well groomed     ROM:   UPPER EXTREMITY--AROM/PROM  (R) UE: WNLs  (L) UE: WNLs     Shoulder Range of Motion:   ACTIVE ROM LEFT RIGHT   Flexion 180 180   Abduction 180 180   Extension 80 80   IR 90 85   ER 90 85     PASSIVE ROM LEFT RIGHT   Flexion WFL WFL   Abduction WFL WFL   IR/90deg WFL WFL   ER/90deg WFL WFL   ER/0deg WFL WFL         Strength: manual muscle test grades below   Upper Extremity Strength   (L) UE (R) UE   Shoulder flexion: 5/5 5/5   Shoulder Abduction: 5/5 5/5   Shoulder IR 5/5 5/5   Shoulder ER 5/5 5/5   Elbow flexion: 5/5 5/5   Elbow extension: 5/5 5/5   Wrist flexion: 5/5 5/5   Wrist extension: 5/5 5/5    5/5 5/5       Baseline Measurements of BL UE's for early detection of  "Lymphedema:     LANDMARK RIGHT UE LEFT UE DIFFERENCE   E + 8" 38 cm 39 cm 1 cm   E + 6" 36 cm 36.5 cm 0.5 cm   E + 4" 33.5 cm 33.5 cm 0 cm   E + 2" 30.5 cm 30.5 cm 0 cm   Elbow 27 cm 27 cm 0 cm   W+ 8" 28 cm 27 cm 1 cm   W +  6" 27 cm 26.5 cm 0.5 cm   W + 4" 22.5 cm 23.5 cm 1 cm   Wrist 17 cm 17 cm 0 cm   DPC 20 cm 19 cm 1 cm   IP Thumb 7 cm 7 cm 0 cm         L DEX Performed during Evaluation: N/A  Pt denies pacemaker, defibrillator and pregnancy   Hand dominance     Affected side    L dex score         Visual/Auditory: denies changes     Coordination:   - fine motor: WFL  - UE coordination: intact     - LE coordination:  Not tested     Functional Mobility (Bed mobility, transfers)  Bed mobility: I =  independent   Roll to left: I  Roll to right: I  Supine to prone: I  Scooting to edge of bed: I  Supine to sit: I  Sit to supine: I  Transfers to bed: I  Transfers to toilet: I  Sit to stand:  I  Stand pivot:  I  Car transfers: I      ADL's:  Feeding: I = independent   Grooming: I  Hygiene: I  UB Dressing: I  LB Dressing: I  Toileting: I  Bathing: I    Gait Assessment:   - AD used: none  - Assistance: independent  - Distance: community distances       Endurance Deficit: none      Patient Education   - role of PT in multi - disciplinary team, goals for PT  - Pt was educated in lymphedema etiology and management plans.    - Pt was provided with written risk reductions and precautions for managing lymphedema.   - Reviewed SHAY drain care instructions.     ROM/lifting Precautions post surgery discussed -  until drains have been removed:  - do not lift affected arm above 90 degrees of shoulder flexion  - do not lift over 5 lbs  - do not pull or push heavy objects  - do not sleep on your stomach or surgery side     Written Home Exercises Provided and Patient Education: Handouts given   Pt was instructed in and performed therapeutic exercise for postural correction and alignment, stretching and soft tissue mobility, and " strengthening.     Exercises included: handout given    - exaggerated deep breathing and relaxation  - scapular retractions  - ball squeeze  - elbow flexion/extension  - pendulum swing    Pt was able to demonstrate and report understanding and performance    Pt has no cultural, educational or language barriers to learning provided.    Functional Limitations Reporting     Category: Carrying and handling        Assessment   This is a 52 y.o. female referred to outpatient physical therapy and presents with a medical diagnosis of left breast cancer and was seen today pre-operatively to assess strength and ROM of BL UEs, to take baseline circumferential measurements of BL UEs to aid in the early detection of lymphedema and provide pt education on exercises/precations post breast surgery. Pt does not exhibit any ROM impairments  Pt educated in lymphedema risks/precautions as well as ROM/lifting precautions post surgery - pt demonstrated/verbalized understanding. No goals established this visit as goals for PT will be established post surgery at follow up.      Anticipated barriers to physical therapy: none     Pt's spiritual, cultural and educational needs considered and pt agreeable to plan of care and goals as stated below:     Medical necessity is demonstrated by the following IMPAIRMENTS/PROMBLEM LIST:  History  Co-morbidities and personal factors that may impact the plan of care Examination  Body Structures and Functions, activity limitations and participation restrictions that may impact the plan of care    Clinical Presentation   Co-morbidities:   Breast Cancer        Personal Factors:   no deficits Body Regions:   upper extremities    Body Systems:   ROM  strength  transfers  edema  scar formation        Participation Restrictions:   none     Activity limitations:   Mobility  no deficits    Self care  no deficits    Domestic Life  no deficits    Interactions/Relationships  no deficits    Life Areas  no  deficits    Community and Social Life  no deficits         evolving clinical presentation with changing clinical characteristics                      moderate   moderate  moderate Decision Making/ Complexity Score:  moderate       Plan   Schedule patient for follow up with Physical therapy post surgery. Goals for therapy post surgery will be established at that time.     Therapist: Rubi Garza, PT  1/19/2018

## 2018-01-22 ENCOUNTER — TELEPHONE (OUTPATIENT)
Dept: SURGERY | Facility: CLINIC | Age: 53
End: 2018-01-22

## 2018-01-22 NOTE — TELEPHONE ENCOUNTER
Patient called to let us know that she met with Dr. Gentile at [place] and has decided to go with a bilateral mastectomy with SHRUTHI flaps. She said with the weather issues last week she wasn't sure if they had gotten in touch with Dr. Raymundo, and she wanted to make sure we were aware of her decision. Told her I would pass this along to Dr. Raymundo and his nurse. Encouraged her to call if she has any questions or concerns

## 2018-01-24 ENCOUNTER — TELEPHONE (OUTPATIENT)
Dept: FAMILY MEDICINE | Facility: CLINIC | Age: 53
End: 2018-01-24

## 2018-01-24 NOTE — TELEPHONE ENCOUNTER
----- Message from Luz Marina Putnam sent at 1/24/2018  8:47 AM CST -----  Contact: Self [phone]  Patient is calling to see if she can see the doctor before 2/7/18 due to she will be having a procedure and she needs to do her physical and clearance. Please advice

## 2018-01-25 ENCOUNTER — PATIENT MESSAGE (OUTPATIENT)
Dept: INTERNAL MEDICINE | Facility: CLINIC | Age: 53
End: 2018-01-25

## 2018-01-26 ENCOUNTER — OFFICE VISIT (OUTPATIENT)
Dept: INTERNAL MEDICINE | Facility: CLINIC | Age: 53
End: 2018-01-26
Payer: COMMERCIAL

## 2018-01-26 VITALS
DIASTOLIC BLOOD PRESSURE: 95 MMHG | BODY MASS INDEX: 33.24 KG/M2 | WEIGHT: 164.88 LBS | HEIGHT: 59 IN | HEART RATE: 81 BPM | SYSTOLIC BLOOD PRESSURE: 148 MMHG

## 2018-01-26 DIAGNOSIS — I10 ESSENTIAL HYPERTENSION: Primary | ICD-10-CM

## 2018-01-26 DIAGNOSIS — E66.9 CLASS 1 OBESITY WITH SERIOUS COMORBIDITY AND BODY MASS INDEX (BMI) OF 33.0 TO 33.9 IN ADULT, UNSPECIFIED OBESITY TYPE: ICD-10-CM

## 2018-01-26 DIAGNOSIS — C50.412 PRIMARY CANCER OF UPPER OUTER QUADRANT OF LEFT BREAST: ICD-10-CM

## 2018-01-26 PROCEDURE — 99999 PR PBB SHADOW E&M-EST. PATIENT-LVL III: CPT | Mod: PBBFAC,,, | Performed by: INTERNAL MEDICINE

## 2018-01-26 PROCEDURE — 99213 OFFICE O/P EST LOW 20 MIN: CPT | Mod: S$GLB,,, | Performed by: INTERNAL MEDICINE

## 2018-01-26 RX ORDER — AMLODIPINE BESYLATE 5 MG/1
TABLET ORAL
Qty: 30 TABLET | Refills: 6 | Status: SHIPPED | OUTPATIENT
Start: 2018-01-26 | End: 2018-03-09 | Stop reason: SDUPTHER

## 2018-01-26 RX ORDER — LISINOPRIL AND HYDROCHLOROTHIAZIDE 12.5; 2 MG/1; MG/1
1 TABLET ORAL EVERY MORNING
Qty: 30 TABLET | Refills: 6 | Status: SHIPPED | OUTPATIENT
Start: 2018-01-26 | End: 2018-03-09 | Stop reason: SDUPTHER

## 2018-01-26 NOTE — PROGRESS NOTES
Pt. ID: Natasha Espinal is a 52 y.o. female      Chief complaint:   Chief Complaint   Patient presents with    Hypertension     follow up       HPI: Pt. Here for f/u for HTN and breast cancer Dx; she is being managed by breast surgery and treatment options are being determined; she states she is coping well and does not need treatment for anxiety and depression; BP is borderline elevated; pt. Feels elevation may be anxiety related and does not want adjustments in meds and will f/u in 6 months after her breast surgery and get re-evaluation she needs refill on BP meds; weight is elevated but stable; she had flu shot 10/17; colonoscopy was 2007 and she will get colonoscopy after her breast surgery; pt. will get labs after her breast surgery on f/u in 6 months    Answers for HPI/ROS submitted by the patient on 1/25/2018   activity change: No  unexpected weight change: No  rhinorrhea: No  trouble swallowing: No  visual disturbance: No  chest tightness: No  polyuria: No  difficulty urinating: No  menstrual problem: No  joint swelling: No  arthralgias: No  confusion: No  dysphoric mood: No        Review of Systems   HENT: Negative for hearing loss.    Eyes: Negative for discharge.   Respiratory: Negative for wheezing.    Cardiovascular: Negative for chest pain and palpitations.   Gastrointestinal: Negative for blood in stool, constipation, diarrhea and vomiting.   Genitourinary: Negative for dysuria and hematuria.   Musculoskeletal: Negative for neck pain.   Neurological: Negative for weakness and headaches.   Endo/Heme/Allergies: Negative for polydipsia.         Objective:    Physical Exam   Constitutional: She is oriented to person, place, and time.   obese   Eyes: EOM are normal.   Neck: Normal range of motion.   Cardiovascular: Normal rate, regular rhythm and normal heart sounds.    Pulmonary/Chest: Effort normal and breath sounds normal.   Abdominal: Soft. There is no tenderness. There is no rebound and no  guarding.   Musculoskeletal: Normal range of motion.   Neurological: She is alert and oriented to person, place, and time.   Skin: No rash noted.         Health Maintenance   Topic Date Due    Pneumococcal PPSV23 (High Risk) (1) 02/22/2018 (Originally [birth year]/2/1982)    Pneumococcal PCV13 (High Risk) (1 - PCV13 Required) 02/23/2018 (Originally [birth year]/2/1965)    Mammogram  12/14/2019    Lipid Panel  09/12/2021    TETANUS VACCINE  07/31/2027    Colonoscopy  01/26/2028    Hepatitis C Screening  Addressed    Influenza Vaccine  Completed         Assessment:     1. Essential hypertension Sub-optimally controlled   2. Primary cancer of upper outer quadrant of left breast Active   3. Class 1 obesity with serious comorbidity and body mass index (BMI) of 33.0 to 33.9 in adult, unspecified obesity type Sub-optimally controlled         Plan: Natasha was seen today for hypertension.    Diagnoses and all orders for this visit:    Essential hypertension  Comments:  continue current regimen and encouraged low na diet and weight loss; pt. would like to recheck BP on f/u in 6 months and adjust prn  Orders:  -     amLODIPine (NORVASC) 5 MG tablet; TAKE 1 TABLET (5 MG TOTAL) BY MOUTH EVERY EVENING.  -     lisinopril-hydrochlorothiazide (PRINZIDE,ZESTORETIC) 20-12.5 mg per tablet; Take 1 tablet by mouth every morning.    Primary cancer of upper outer quadrant of left breast  Comments:  f/u breast surgery for scheduled mastectomy with reconstruction    Class 1 obesity with serious comorbidity and body mass index (BMI) of 33.0 to 33.9 in adult, unspecified obesity type  Comments:  encouraged diet and explained risks         Problem List Items Addressed This Visit        Cardiac/Vascular    HTN (hypertension) - Primary    Relevant Medications    amLODIPine (NORVASC) 5 MG tablet    lisinopril-hydrochlorothiazide (PRINZIDE,ZESTORETIC) 20-12.5 mg per tablet       Oncology    Primary cancer of upper outer quadrant of left breast        Endocrine    Class 1 obesity with serious comorbidity and body mass index (BMI) of 33.0 to 33.9 in adult

## 2018-01-29 ENCOUNTER — TELEPHONE (OUTPATIENT)
Dept: SURGERY | Facility: CLINIC | Age: 53
End: 2018-01-29

## 2018-01-29 NOTE — TELEPHONE ENCOUNTER
Spoke with patient, I fax FMLA paper work to third party company on Friday 1/26/2018, pt stated undstanding

## 2018-01-29 NOTE — TELEPHONE ENCOUNTER
----- Message from Christina Xavier RN sent at 1/29/2018  9:01 AM CST -----  Patient requesting a callback regarding February 7th surgery and [facility] paperwork.

## 2018-02-02 ENCOUNTER — OFFICE VISIT (OUTPATIENT)
Dept: CARDIOLOGY | Facility: CLINIC | Age: 53
End: 2018-02-02
Payer: COMMERCIAL

## 2018-02-02 ENCOUNTER — HOSPITAL ENCOUNTER (OUTPATIENT)
Dept: RADIOLOGY | Facility: HOSPITAL | Age: 53
Discharge: HOME | End: 2018-02-02
Attending: INTERNAL MEDICINE
Payer: COMMERCIAL

## 2018-02-02 ENCOUNTER — HOSPITAL ENCOUNTER (OUTPATIENT)
Dept: CARDIOLOGY | Facility: HOSPITAL | Age: 53
Discharge: HOME | End: 2018-02-02
Attending: INTERNAL MEDICINE
Payer: COMMERCIAL

## 2018-02-02 VITALS
DIASTOLIC BLOOD PRESSURE: 74 MMHG | HEIGHT: 65 IN | WEIGHT: 164.56 LBS | HEART RATE: 78 BPM | SYSTOLIC BLOOD PRESSURE: 120 MMHG | OXYGEN SATURATION: 98 % | BODY MASS INDEX: 27.42 KG/M2

## 2018-02-02 DIAGNOSIS — E78.2 MIXED HYPERLIPIDEMIA: ICD-10-CM

## 2018-02-02 DIAGNOSIS — Z01.810 PREOP CARDIOVASCULAR EXAM: ICD-10-CM

## 2018-02-02 DIAGNOSIS — I10 ESSENTIAL HYPERTENSION: ICD-10-CM

## 2018-02-02 DIAGNOSIS — I11.9 HYPERTENSIVE HEART DISEASE: ICD-10-CM

## 2018-02-02 DIAGNOSIS — Z01.818 PRE-OP EVALUATION: Primary | ICD-10-CM

## 2018-02-02 DIAGNOSIS — Z01.818 PRE-OP EVALUATION: ICD-10-CM

## 2018-02-02 LAB
DIASTOLIC DYSFUNCTION: NO
MITRAL VALVE MOBILITY: NORMAL
RETIRED EF AND QEF - SEE NOTES: 60 (ref 55–65)

## 2018-02-02 PROCEDURE — 93017 CV STRESS TEST TRACING ONLY: CPT

## 2018-02-02 PROCEDURE — 93306 TTE W/DOPPLER COMPLETE: CPT

## 2018-02-02 PROCEDURE — 93306 TTE W/DOPPLER COMPLETE: CPT | Mod: 26,,, | Performed by: INTERNAL MEDICINE

## 2018-02-02 PROCEDURE — 93018 CV STRESS TEST I&R ONLY: CPT | Mod: ,,, | Performed by: INTERNAL MEDICINE

## 2018-02-02 PROCEDURE — 93016 CV STRESS TEST SUPVJ ONLY: CPT | Mod: ,,, | Performed by: INTERNAL MEDICINE

## 2018-02-02 PROCEDURE — 3008F BODY MASS INDEX DOCD: CPT | Mod: S$GLB,,, | Performed by: INTERNAL MEDICINE

## 2018-02-02 PROCEDURE — A9502 TC99M TETROFOSMIN: HCPCS

## 2018-02-02 PROCEDURE — 99205 OFFICE O/P NEW HI 60 MIN: CPT | Mod: S$GLB,,, | Performed by: INTERNAL MEDICINE

## 2018-02-02 PROCEDURE — 99999 PR PBB SHADOW E&M-EST. PATIENT-LVL III: CPT | Mod: PBBFAC,,, | Performed by: INTERNAL MEDICINE

## 2018-02-02 PROCEDURE — 93000 ELECTROCARDIOGRAM COMPLETE: CPT | Mod: S$GLB,,, | Performed by: INTERNAL MEDICINE

## 2018-02-02 PROCEDURE — 78452 HT MUSCLE IMAGE SPECT MULT: CPT | Mod: 26,,, | Performed by: RADIOLOGY

## 2018-02-02 RX ORDER — CETIRIZINE HYDROCHLORIDE 10 MG/1
10 TABLET ORAL DAILY
COMMUNITY

## 2018-02-02 RX ORDER — CYANOCOBALAMIN (VITAMIN B-12) 500 MCG
TABLET ORAL DAILY
COMMUNITY
End: 2019-07-19

## 2018-02-02 NOTE — PROGRESS NOTES
[facility]    CC:   Chief Complaint   Patient presents with    Pre-op Exam     Double mastectomy w/ reconstruction       Patient ID: Natasha Espinal is a 52 y.o. female with a past medical history of HTN, HLD, recently diagnosed left breast cancer, who presents for preoperative cardiac risk stratification prior to planned bilateral nipple-sparing mastectomy with left-sided sentinel lymph node biopsy, possible left-sided axillary dissection.  Pertinent history/events are as follows:     -Patient was initially referred for surgical evaluation of a breast lump on exam first noted late september. Follow-up mammogram and ultrasound (12/5/17) showed a 29 mm irregularly shaped mass seen in the upper outer quadrant of the left breast. BI-RADS 4. A ultrasound guided biopsy was performed on 12/14/17 with pathology revealing infiltrating ductal carcinoma of the breast.    -Findings at that time were the following:   Tumor size: 3.2 cm   Tumor ndgndrndanddndend:nd nd2nd Estrogen Receptor: positive  Progesterone Receptor: positive  Her-2 tahir: FISH pending  Lymph node status: Clinically negative  Lymphatic invasion: N/A    -Plan is to perform bilateral nipple-sparing mastectomy with left-sided sentinel lymph node biopsy, possible left-sided axillary dissection with Dr. Raymundo on 2/7/2018.    HPI:  Mrs. Espinal reports no chest pain, no history of MI or CAD, heart failure symptoms or documented arrhythmia(s).  She reports a family of CAD in her father with 4 vessel CABG at age 67.  Echo from 1/5/2015 showed normal left ventricular systolic function (EF 55-60%); normal right ventricular systolic function; no significant valvular abnormalities.  Pt's 10 year ASCVD risk is approximately 2.7%.  EKG today shows normal sinus rhythm; left axis deviation; low voltage; evidence of inferior infarct (age undetermined).   No previous EKG's are available for comparison.      Past Medical History:   Diagnosis Date     Hypertension      Past Surgical History:   Procedure Laterality Date    BREAST LUMPECTOMY  1983    HYSTERECTOMY  2011     Social History     Social History    Marital status:      Spouse name: N/A    Number of children: N/A    Years of education: N/A     Occupational History    Not on file.     Social History Main Topics    Smoking status: Never Smoker    Smokeless tobacco: Never Used    Alcohol use No    Drug use: No    Sexual activity: Not on file     Other Topics Concern    Not on file     Social History Narrative    No narrative on file     Family History   Problem Relation Age of Onset    Heart disease Father     Breast cancer Paternal Aunt     Breast cancer Paternal Aunt     Breast cancer Paternal Aunt        Review of patient's allergies indicates:   Allergen Reactions    Sulfa (sulfonamide antibiotics) Nausea Only       Medication List with Changes/Refills   Current Medications    AMLODIPINE (NORVASC) 5 MG TABLET    TAKE 1 TABLET (5 MG TOTAL) BY MOUTH EVERY EVENING.    CETIRIZINE (ZYRTEC) 10 MG TABLET    Take 10 mg by mouth once daily.    LISINOPRIL-HYDROCHLOROTHIAZIDE (PRINZIDE,ZESTORETIC) 20-12.5 MG PER TABLET    Take 1 tablet by mouth every morning.    MELATONIN 1 MG TAB    Take by mouth once daily.    MULTIVIT-IRON-MIN-FOLIC ACID 3,500-18-0.4 UNIT-MG-MG ORAL CHEW    Take by mouth once daily.       Review of Systems  Constitution: Denies chills, fever, and sweats.  HENT: Denies headaches or blurry vision.  Cardiovascular: Denies chest pain or irregular heart beat.  Respiratory: Denies cough or shortness of breath.  Gastrointestinal: Denies abdominal pain, nausea, or vomiting.  Musculoskeletal: Denies muscle cramps.  Neurological: Denies dizziness or focal weakness.  Psychiatric/Behavioral: Normal mental status.  Hematologic/Lymphatic: Denies bleeding problem or easy bruising/bleeding.  Skin: Denies rash or suspicious lesions    Physical Examination  /74 (BP Location: Left  "arm, Patient Position: Sitting, BP Method: Large (Manual))   Pulse 78   Ht 5' 5" (1.651 m)   Wt 74.6 kg (164 lb 9.2 oz)   SpO2 98%   BMI 27.39 kg/m²     Constitutional: No acute distress, conversant  HEENT: Sclera anicteric, Pupils equal, round and reactive to light, extraocular motions intact, Oropharynx clear  Neck: No JVD, no carotid bruits  Cardiovascular: regular rate and rhythm, no murmur, rubs or gallops, normal S1/S2  Pulmonary: Clear to auscultation bilaterally  Abdominal: Abdomen soft, nontender, nondistended, positive bowel sounds  Extremities: No lower extremity edema,   Pulses:  Carotid pulses are 2+ on the right side, and 2+ on the left side.  Radial pulses are 2+ on the right side, and 2+ on the left side.   Femoral pulses are 2+ on the right side, and 2+ on the left side.  Skin: No ecchymosis, erythema, or ulcers  Psych: Alert and oriented x 3, appropriate affect  Neuro: CNII-XII intact, no focal deficits    Labs:  Most Recent Data  CBC:   Lab Results   Component Value Date    WBC 6.02 01/29/2018    HGB 13.6 01/29/2018    HCT 41.0 01/29/2018     01/29/2018    MCV 86 01/29/2018    RDW 13.0 01/29/2018     BMP:   Lab Results   Component Value Date     01/29/2018    K 4.1 01/29/2018     01/29/2018    CO2 31 (H) 01/29/2018    BUN 14 01/29/2018    CREATININE 0.67 01/29/2018    GLU 98 01/29/2018    CALCIUM 10.0 01/29/2018     LFTS;   Lab Results   Component Value Date    PROT 6.8 09/12/2016    ALBUMIN 3.4 (L) 09/12/2016    BILITOT 0.2 09/12/2016    AST 16 09/12/2016    ALKPHOS 64 09/12/2016    ALT 21 09/12/2016     COAGS: No results found for: INR, PROTIME, PTT  FLP:   Lab Results   Component Value Date    CHOL 215 (H) 09/12/2016    HDL 43 09/12/2016    LDLCALC 133.6 09/12/2016    TRIG 192 (H) 09/12/2016    CHOLHDL 20.0 09/12/2016       EKG 2/2/2018:  Normal sinus rhythm  Left axis deviation; low voltage  Evidence of inferior infarct (age undetermined).       Echo " 1/5/2015:  CONCLUSIONS     1 - Normal left ventricular systolic function (EF 55-60%).     2 - Normal right ventricular systolic function .     3 - Concentric hypertrophy.    Assessment/Plan:   Natasha Espinal is a 52 y.o. female with a past medical history of HTN, HLD, recently diagnosed left breast cancer, who presents for preoperative cardiac risk stratification prior to planned bilateral nipple-sparing mastectomy with left-sided sentinel lymph node biopsy, possible left-sided axillary dissection.    1. Preoperative Cardiac Risk Stratification- Mrs. Espinal has no chest pain, no history of MI or CAD, heart failure symptoms or documented arrhythmia(s).  She has not had an ischemic evaluation in the past, and no evaluation of LV function since 2015.  EKG is abnormal as described above.  Pt's functional status is unknown.  Procedure is moderate risk.  Plan to get echo and nuclear stress test today to complete preoperative cardiac risk stratification.  I will communicate this plan to Dr. Raymundo.      Follow up results of studies when completed     Total duration of face to face visit time 30 minutes.  Total time spent counseling greater than fifty percent of total visit time.  Counseling included discussion regarding imaging findings, diagnosis, possibilities, treatment options, risks and benefits.  The patient had many questions regarding the options and long-term effects.    Shalom Moreno MD, PhD  Interventional Cardiology

## 2018-02-05 ENCOUNTER — DOCUMENTATION ONLY (OUTPATIENT)
Dept: CARDIOLOGY | Facility: CLINIC | Age: 53
End: 2018-02-05

## 2018-02-05 LAB — DIASTOLIC DYSFUNCTION: NO

## 2018-02-05 NOTE — PROGRESS NOTES
Preoperative Cardiac Risk Stratification:  Natasha Espinal is a 52 y.o. female with a past medical history of HTN, HLD, recently diagnosed left breast cancer, who presents for preoperative cardiac risk stratification prior to planned bilateral nipple-sparing mastectomy with left-sided sentinel lymph node biopsy, possible left-sided axillary dissection.     1. Preoperative Cardiac Risk Stratification- Mrs. Espinal has no chest pain, no history of MI or CAD, heart failure symptoms or documented arrhythmia(s).  Echo from 2/2/2018 shows normal left ventricular systolic function (EF 60-65%); normal left ventricular diastolic function; no significant valvular abnormalities.  Nuclear stress test from 2/2/2018 shows normal myocardial perfusion  without evidence for infarct or reversible ischemia.  No evidence for transient ischemic dilatation.   LV ejection fraction of 81%.  Hence, she is at low risk for a perioperative major adverse cardiac event during this moderate risk procedure.  Revised Cardia Risk Score of 0.4%.  No further testing indicated.        Echo 2/2/2018:  CONCLUSIONS     1 - Normal left ventricular systolic function (EF 60-65%).     2 - Normal left ventricular diastolic function.     3 - Normal right ventricular systolic function .     Nuclear Stress Test 2/2/2018:  EKG Conclusions:    1. The EKG portion of this study is negative for ischemia at a moderate workload, and peak heart rate of 162 bpm (102% of predicted).   2. Exercise capacity is average.   3. Blood pressure response to exercise was normal (Presenting BP: 123/79 Peak BP: 167/73).   4. No significant arrhythmias were present.   5. There were no symptoms of chest discomfort or significant dyspnea throughout the protocol.   6. The Duke treadmill score was 6 suggesting a low probability for future cardiovascular events.    Imaging Portion:  There is homogeneous perfusion at rest and stress without evidence for infarct or reversible ischemia.   No evidence for transient ischemic dilatation.  Normal wall motion.  Stress LV ejection fraction of 81%, within normal limits.    Impression:  Normal examination.

## 2018-02-06 ENCOUNTER — TELEPHONE (OUTPATIENT)
Dept: SURGERY | Facility: CLINIC | Age: 53
End: 2018-02-06

## 2018-02-06 NOTE — TELEPHONE ENCOUNTER
----- Message from Billy Herndon sent at 2/6/2018  9:17 AM CST -----  [phone]//pt states that she needs to speak with nurse in ref to a few questions about paperwork//please call/thank you

## 2018-03-01 ENCOUNTER — OFFICE VISIT (OUTPATIENT)
Dept: SURGERY | Facility: CLINIC | Age: 53
End: 2018-03-01
Payer: COMMERCIAL

## 2018-03-01 VITALS
HEART RATE: 79 BPM | TEMPERATURE: 99 F | HEIGHT: 65 IN | WEIGHT: 160.88 LBS | SYSTOLIC BLOOD PRESSURE: 109 MMHG | BODY MASS INDEX: 26.8 KG/M2 | DIASTOLIC BLOOD PRESSURE: 74 MMHG

## 2018-03-01 DIAGNOSIS — C50.412 PRIMARY CANCER OF UPPER OUTER QUADRANT OF LEFT BREAST: Primary | ICD-10-CM

## 2018-03-01 PROCEDURE — 99999 PR PBB SHADOW E&M-EST. PATIENT-LVL III: CPT | Mod: PBBFAC,,, | Performed by: SURGERY

## 2018-03-01 PROCEDURE — 99024 POSTOP FOLLOW-UP VISIT: CPT | Mod: S$GLB,,, | Performed by: SURGERY

## 2018-03-01 RX ORDER — GABAPENTIN 300 MG/1
CAPSULE ORAL
COMMUNITY
Start: 2018-02-11 | End: 2018-03-29

## 2018-03-01 RX ORDER — HYDROCODONE BITARTRATE AND ACETAMINOPHEN 7.5; 325 MG/1; MG/1
TABLET ORAL
COMMUNITY
Start: 2018-02-11 | End: 2018-03-29

## 2018-03-01 RX ORDER — MINOCYCLINE HYDROCHLORIDE 100 MG/1
CAPSULE ORAL
COMMUNITY
Start: 2018-02-21 | End: 2018-03-29

## 2018-03-01 NOTE — PROGRESS NOTES
"Natasha Espinal is a 52 y.o. female patient.   No diagnosis found.  Past Medical History:   Diagnosis Date    Hypertension      No past surgical history pertinent negatives on file.  Scheduled Meds:  Continuous Infusions:  PRN Meds:    Review of patient's allergies indicates:   Allergen Reactions    Sulfa (sulfonamide antibiotics) Nausea Only     There are no hospital problems to display for this patient.    Blood pressure 109/74, pulse 79, temperature 98.5 °F (36.9 °C), temperature source Oral, height 5' 5" (1.651 m), weight 73 kg (160 lb 14.4 oz).    Subjective   53 yo F with h/o L br ca s/p BL mastectomy, L SLNB with SHRUTHI recon at [facility]. Doing well. Some eschar by nipples, being followed by Dr Gentile. Pain improving.     Objective   NAD  RRR  Unlabored breathing  Abd soft, umbilical incision healing  Ecchymoses L chest > right, small area of eschar periareolar/medial aspect of each nipple with no nipple ischemia    Path- 3.4 cm IDC, area of DCIS with margins >1.7 cm, 0/3 lymph nodes positive     Assessment & Plan    53 yo F with ER/IN+, 3.4 cm IDC with areas of DCIS (T2N0) s/p mastectomy, SLNB 2/23/18    -has appt for med onc for endocrine therapy 3/27  -needs survivorship appt  -6 month CBE    Emeterio Raymundo MD  3/4/2018     I have personally taken the history and examined this patient and agree with the resident's note as stated above.  Natasha Espinal is a very pleasant 52-year-old  female, well known to   me, who presents with her friend, Natasha, today for her first initial postop   visit with me.  She underwent bilateral nipple-sparing mastectomy with   immediate autologous tissue bilateral SHRUTHI flap reconstructions by Dr. Benjamin Gentile at [facility] on 02/07/2018.  Final   pathology revealed benign findings on the right.  The left side revealed a known   T2 3.4 cm low-grade invasive carcinoma of the breast with three negative   sentinel nodes making this " a T2 N0 stage IIA.  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  She   will not be requiring adjuvant radiotherapy given the stage II diagnosis.  She   underwent genetics testing, which was negative.  We also performed a MammaPrint   assessment for genomic analysis, which was low risk, so she likely will not be   recommended for any adjuvant chemotherapy in terms of cytotoxic systemic   chemotherapy.  She will be recommended for adjuvant endocrine therapy.  She is   due to see Dr. Olivier on 03/27/2018 for an appointment.  She is doing quite   well status post the bilateral nipple-sparing mastectomies with SHRUTHI flap   reconstruction.  There is no evidence of infection, drainage, abscess or   ischemia.  There is a thin rim of benign eschar along the incision sites,   especially along the lower inner circumareolar region.  The nipples are viable   and she will have an excellent symmetrical cosmetic result.  There is some   ecchymosis bilaterally, but no signs of infection.  She offers no subjective   complaints.  She will be presented at our breast cancer multidisciplinary   conference and she will have a consultation with Dr. Olivier on 03/27/2018   as outlined above.  She will, otherwise, follow up with me in six months.    Likely recommendations will be for adjuvant endocrine therapy given the low risk   MammaPrint results.  A copy of the pathology report was reviewed with the   patient and provided to her.      CODY/JOAQUINA  dd: 03/04/2018 14:41:21 (CST)  td: 03/05/2018 05:45:57 (CST)  Doc ID   #[id number]  Job ID #[id number]    CC:     Job # [id number].

## 2018-03-01 NOTE — LETTER
[facility]  [address]  [place] [address]  Phone: [phone]  Fax: [phone] March 4, 2018      Bang Snowden MD  [address] [facility] [place] [address]    Patient: Natasha Espinal   MR Number: [id number]   YOB: 1965   Date of Visit: 3/1/2018     Dear Dr. Snowden:    Thank you for referring Natasha Espinal to me for evaluation.     Natasha Espinal is a very pleasant 52-year-old  female, well known to me, who presents with her friend, Natasha, today for her first initial postop visit with me.  She underwent bilateral nipple-sparing mastectomy with immediate autologous tissue bilateral SHRUTHI flap reconstructions by Dr. Benjamin Gentile [place] on 02/07/2018.  Final pathology revealed benign findings on the right.  The left side revealed a known T2 3.4 cm low-grade invasive carcinoma of the breast with three negative sentinel nodes making this a T2 N0 stage IIA.  Surgical margins for both the invasive component and associated intermediate grade, cribriform type DCIS without necrosis was 1.7 cm away indicating appropriate surgical margins.  She will not be requiring adjuvant radiotherapy given the stage II diagnosis.  She underwent genetics testing, which was negative.  We also performed a MammaPrint assessment for genomic analysis, which was low risk, so she likely will not be recommended for any adjuvant chemotherapy in terms of cytotoxic systemic chemotherapy.  She will be recommended for adjuvant endocrine therapy.  She is due to see Dr. Olivier on 03/27/2018 for an appointment.      She is doing quite well status post the bilateral nipple-sparing mastectomies with SHRUTHI flap reconstruction.  There is no evidence of infection, drainage, abscess or ischemia.  There is a thin rim of benign eschar along the incision sites, especially along the lower inner circumareolar region.  The nipples are viable and she will have an  excellent symmetrical cosmetic result.  There is some ecchymosis bilaterally, but no signs of infection.  She offers no subjective complaints.  She will be presented at our breast cancer multidisciplinary conference and she will have a consultation with Dr. Olivier on 03/27/2018 as outlined above.  She will, otherwise, follow up with me in six months.      Likely recommendations will be for adjuvant endocrine therapy given the low risk MammaPrint results.  A copy of the pathology report was reviewed with the patient and provided to her.    If you have questions, please do not hesitate to call me. I look forward to following Natasha Espinal along with you.    Sincerely,      Emeterio Raymundo MD  Medical Director, [facility] Breast [place]  Section of General and Oncologic Surgery  [facility]    RLC/hcr    CC  MD Benjamin Raygoza MD

## 2018-03-09 DIAGNOSIS — I10 ESSENTIAL HYPERTENSION: ICD-10-CM

## 2018-03-09 RX ORDER — AMLODIPINE BESYLATE 5 MG/1
TABLET ORAL
Qty: 30 TABLET | Refills: 5 | Status: SHIPPED | OUTPATIENT
Start: 2018-03-09 | End: 2019-04-02 | Stop reason: SDUPTHER

## 2018-03-09 RX ORDER — LISINOPRIL AND HYDROCHLOROTHIAZIDE 12.5; 2 MG/1; MG/1
1 TABLET ORAL EVERY MORNING
Qty: 30 TABLET | Refills: 5 | Status: SHIPPED | OUTPATIENT
Start: 2018-03-09 | End: 2019-04-02 | Stop reason: SDUPTHER

## 2018-03-23 ENCOUNTER — TELEPHONE (OUTPATIENT)
Dept: SURGERY | Facility: CLINIC | Age: 53
End: 2018-03-23

## 2018-03-23 NOTE — TELEPHONE ENCOUNTER
----- Message from Dorothy Whitt sent at 3/23/2018 10:39 AM CDT -----  Contact: [employer] [phone] Ref # [id number]  Caller states she is calling to speak with nurse in reference to genetic testing denial please call [employer] back @ [phone] Ref # [id number] Thank You :)

## 2018-03-23 NOTE — TELEPHONE ENCOUNTER
"Returned call to [facility] regarding denial for mammaprint, spoke with Jewell at [facility], mammaprint denied at this time rt "deemed experimental,"  MD may set up a peer to peer by calling [phone] with ref#[id number], will notify MD at this time   "

## 2018-03-27 ENCOUNTER — OFFICE VISIT (OUTPATIENT)
Dept: SURGERY | Facility: CLINIC | Age: 53
End: 2018-03-27
Payer: COMMERCIAL

## 2018-03-27 ENCOUNTER — TUMOR BOARD CONFERENCE (OUTPATIENT)
Dept: SURGERY | Facility: CLINIC | Age: 53
End: 2018-03-27

## 2018-03-27 ENCOUNTER — TELEPHONE (OUTPATIENT)
Dept: OBSTETRICS AND GYNECOLOGY | Facility: CLINIC | Age: 53
End: 2018-03-27

## 2018-03-27 VITALS
OXYGEN SATURATION: 99 % | DIASTOLIC BLOOD PRESSURE: 78 MMHG | RESPIRATION RATE: 18 BRPM | HEART RATE: 101 BPM | TEMPERATURE: 98 F | HEIGHT: 59 IN | SYSTOLIC BLOOD PRESSURE: 122 MMHG | BODY MASS INDEX: 32.25 KG/M2 | WEIGHT: 160 LBS

## 2018-03-27 DIAGNOSIS — Z17.0 MALIGNANT NEOPLASM OF UPPER-OUTER QUADRANT OF LEFT BREAST IN FEMALE, ESTROGEN RECEPTOR POSITIVE: Primary | ICD-10-CM

## 2018-03-27 DIAGNOSIS — C50.412 PRIMARY CANCER OF UPPER OUTER QUADRANT OF LEFT BREAST: Primary | ICD-10-CM

## 2018-03-27 DIAGNOSIS — C50.412 MALIGNANT NEOPLASM OF UPPER-OUTER QUADRANT OF LEFT BREAST IN FEMALE, ESTROGEN RECEPTOR POSITIVE: Primary | ICD-10-CM

## 2018-03-27 PROCEDURE — 3074F SYST BP LT 130 MM HG: CPT | Mod: CPTII,S$GLB,, | Performed by: INTERNAL MEDICINE

## 2018-03-27 PROCEDURE — 3078F DIAST BP <80 MM HG: CPT | Mod: CPTII,S$GLB,, | Performed by: INTERNAL MEDICINE

## 2018-03-27 PROCEDURE — 99205 OFFICE O/P NEW HI 60 MIN: CPT | Mod: S$GLB,,, | Performed by: INTERNAL MEDICINE

## 2018-03-27 PROCEDURE — 99999 PR PBB SHADOW E&M-EST. PATIENT-LVL III: CPT | Mod: PBBFAC,,, | Performed by: INTERNAL MEDICINE

## 2018-03-27 RX ORDER — ANASTROZOLE 1 MG/1
1 TABLET ORAL DAILY
Qty: 90 TABLET | Refills: 2 | Status: SHIPPED | OUTPATIENT
Start: 2018-03-27 | End: 2018-03-27 | Stop reason: SDUPTHER

## 2018-03-27 RX ORDER — ANASTROZOLE 1 MG/1
1 TABLET ORAL DAILY
Qty: 90 TABLET | Refills: 2 | Status: SHIPPED | OUTPATIENT
Start: 2018-03-27 | End: 2019-03-27

## 2018-03-27 NOTE — PROGRESS NOTES
Interdisciplinary Breast Cancer Conference    Natasha Espinal    Female    Date Presented to Tumor Board: 03/27/18    HOSPTIAL/CLINIC PRESENTING: [facility]    TUMOR SITE: LEFT    TUMOR SITE: UOQ (4:00 axis 2.3cm inferior to known 2:00 lesion)    Presenter: Kiersten Farmer (on behalf of Emeterio Raymundo MD)    Reason For Consultation: Initial Presentation    Specialties Present: Surgical Oncology;Radiation Oncology;Medical Oncology;Navigation;Research;Pathology;Radiology    Patient Status: a current patient    Treatment to Date: Surgical Intervention(s) (mammaprint prior to surgery LOW RISK, so proceeded with bilateral mastectomy)    Clinical Trial Eligibility: None available    Estrogen Receptor Status: Positive    Progesterone Status: Positive    Her2/JOHNNY Status: Negative (by FISH)    Cancer Staging  Invasive ductal carcinoma grade 2, T2N0, 3 negative lymph nodes  Stage IB per AJCC 8th ed. pathologic prognostic staging system      RECOMMENDED PLAN: Endocrine Therapy   With low mammaprint risk, can proceed with AI  Bilateral mastectomies - no radiation needed    UNSTAGEABLE: No         PRESENTATION AT CANCER CONFERENCE: Prospective

## 2018-03-27 NOTE — PROGRESS NOTES
Subjective:       Patient ID: Natasha Espinal is a 52 y.o. female.    Chief Complaint: No chief complaint on file.    HPI     REFERRING PHYSICIAN:  Emeterio Raymundo M.D.    REASON FOR REFERRAL:  Recent diagnosis of breast cancer, consideration for   adjuvant treatment.    HISTORY OF PRESENT ILLNESS:  Mrs. Espinal is a 52-year-old  female who   is referred for evaluation after undergoing bilateral nipple-sparing   mastectomies.  Apparently, she felt a mass in the left breast in 2017.    A mammogram was performed in December that was read as BI-RADS 4 and showed   2.9 cm irregularly shaped mass in the upper outer quadrant of the left breast.    A biopsy was performed on 2017 and showed an infiltrating ductal carcinoma   that was ER strongly positive, NE positive, and HER-2 negative by FISH.  The   patient was seen by Plastic Surgery and had surgery at [facility] on   2018.  The pathology report from the 2018 procedure indicates that   three sentinel lymph nodes were negative on the left.  On the left   nipple-sparing mastectomy specimen, there was a 3.4 cm tumor.  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  There was no evidence of DCIS or   malignancy on the right mastectomy specimen.  A MammaPrint test was sent and   suggested that she had a 97.8% chance of disease free survival at five years   with hormonal therapy.  The patient presents for evaluation.    PAST MEDICAL HISTORY:  As above.  She also has a history of hypertension and   asthma, which is treated with inhaled treatments p.r.n.    GYN HISTORY:  Menarche was at 15, first live birth at 29 and she underwent a   hysterectomy six years ago.  She believes it was a total hysterectomy including   the ovaries and she was actually on hormone replacement therapy for seven years.    We do not have a copy of the pathology report.  She is  with first live   birth at 29.    SOCIAL HISTORY:   She is a .  She is .  She does not smoke   and does not drink alcohol.    FAMILY HISTORY:  Significant for several family members with breast cancer in   the paternal side.  Specifically, two paternal aunts have been diagnosed with   breast cancer in their 40s while a paternal cousin  of breast cancer in her   40s.  A second maternal cousin had breast cancer in her 60s.    Review of Systems    Overall she feels well.  She has almost completely recovered from her bilateral nipple sparing mastectomies.  ECOG PS is 1.  She denies any anxiety, depression, easy bruising, fevers, chills, night  sweats, weight loss, nausea, vomiting, diarrhea, constipation, diplopia, blurred vision, headache, chest pain, palpitations, shortness of breath, breast pain, abdominal pain, extremity pain, or difficulty ambulating.  The remainder of the ten-point ROS, including general, skin, lymph, H/N, cardiorespiratory, GI, , Neuro, Endocrine, and psychiatric is negative.       Objective:      Physical Exam        She is alert, oriented to time, place, person, pleasant, well nourished, in no acute physical distress.  She is here with a female friend.                                  VITAL SIGNS:  Reviewed                                      HEENT:  Normal.  There are no nasal, oral, lip, gingival, auricular, lid,    or conjunctival lesions.  Mucosae are moist and pink, and there is no        thrush.  Pupils are equal, reactive to light and accommodation.              Extraocular muscle movements are intact.  Dentition is good.  There is no frontal or maxillary tenderness.                                     NECK:  Supple without JVD, adenopathy, or thyromegaly.                       LUNGS:  Clear to auscultation without wheezing, rales, or rhonchi.           CARDIOVASCULAR:  Reveals an S1, S2, no murmurs, no rubs, no gallops.         ABDOMEN:  Soft, nontender, without organomegaly.  Bowel sounds are    present.    Her SHRUTHI flap incision has healed nicely and there is only a small area of superficial ulceration to the left of the midline.                                                                 EXTREMITIES:  No cyanosis, clubbing, or edema.                               BREASTS:  She is status post bilateral nipples paring mastectomies.     Her incisions are well healed                                  LYMPHATIC:  There is no cervical, axillary, or supraclavicular adenopathy.   SKIN:  Warm and moist, without petechiae, rashes, induration, or ecchymoses.           NEUROLOGIC:  DTRs are 0-1+ bilaterally, symmetrical, motor function is 5/5,  and cranial nerves are  within normal limits.    Assessment:       1. Primary cancer of upper outer quadrant of left breast        Plan:        I had a long discussion with her.  She has a T2N0M0 carcinoma and the mammaprint suggested that she has a 97.8 % chance of DFS at 5 years with endocrine therapy.  In vew of the above, I see no reason to recommend adjuvant chemotherapy.  Since she is still s/p KLAUS BSO and therefore postmenopausal, I recommended that she consider anastrazole x 5 years.  The pros and cons of such an approach were explained to her.  She was given a prescription for anastrazole and I will see her in a month.  There is no need for a DXA scan since she had had one in 11/2017 and it had shown normal bone density.  She has signed a release of information so that we can obtain a copy of the operative and pathology report from her GYN physician to confirm that theovaries have been removed.  I have told her NOT to start the arimidex until her postmenopausal status has been confirmed.  Her multiple questions were answered to her satisfaction.  I spent approximately 65 minutes reviewing the available records, participating at the discussion of her case during the Breast Conference earlier today, and evaluating the patient, out of which over 50% of the time was spent face to  face with the patient in counseling and coordinating this patient's care.

## 2018-03-27 NOTE — TELEPHONE ENCOUNTER
----- Message from Christina Xavier RN sent at 3/27/2018  1:51 PM CDT -----  Regarding: new survivorship Desire George, this is a post-op patient of Cors[facility]athy's, about to start endocrine therapy. Very interested in survivorship. Referral is in!

## 2018-03-27 NOTE — Clinical Note
Please have her sign the release of information and obtain the pathology report from her GYN physician from her hysterectomy 7 years ago. See me in early May.

## 2018-03-27 NOTE — TELEPHONE ENCOUNTER
Called Natasha Espinal to schedule an initial survivorship consult / evaluation with Dr. Phipps, as suggested by Dr. Raymundo at her recent appointment.     Explained to Natasha that Dr. Phipps now specializes in the delicate issues that often accompany cancer treatment & survivorship.  This does not take the place of her current physician /treatment, but in fact compliments it.      Patient is agreeable to an appointment on March 29th at 9am.   Verbal directions to the office given.  Pt verbalized understanding.

## 2018-03-29 ENCOUNTER — OFFICE VISIT (OUTPATIENT)
Dept: OBSTETRICS AND GYNECOLOGY | Facility: CLINIC | Age: 53
End: 2018-03-29
Attending: SURGERY
Payer: COMMERCIAL

## 2018-03-29 ENCOUNTER — LAB VISIT (OUTPATIENT)
Dept: LAB | Facility: OTHER | Age: 53
End: 2018-03-29
Attending: OBSTETRICS & GYNECOLOGY
Payer: COMMERCIAL

## 2018-03-29 VITALS
SYSTOLIC BLOOD PRESSURE: 110 MMHG | DIASTOLIC BLOOD PRESSURE: 78 MMHG | WEIGHT: 161.38 LBS | HEIGHT: 59 IN | BODY MASS INDEX: 32.53 KG/M2

## 2018-03-29 DIAGNOSIS — G47.9 SLEEP DISTURBANCE: ICD-10-CM

## 2018-03-29 DIAGNOSIS — N95.1 MENOPAUSAL SYMPTOM: ICD-10-CM

## 2018-03-29 DIAGNOSIS — C50.919 MALIGNANT NEOPLASM OF FEMALE BREAST, UNSPECIFIED ESTROGEN RECEPTOR STATUS, UNSPECIFIED LATERALITY, UNSPECIFIED SITE OF BREAST: ICD-10-CM

## 2018-03-29 DIAGNOSIS — Z13.21 ENCOUNTER FOR VITAMIN DEFICIENCY SCREENING: Primary | ICD-10-CM

## 2018-03-29 DIAGNOSIS — N95.2 VAGINAL ATROPHY: ICD-10-CM

## 2018-03-29 DIAGNOSIS — Z13.21 ENCOUNTER FOR VITAMIN DEFICIENCY SCREENING: ICD-10-CM

## 2018-03-29 LAB — 25(OH)D3+25(OH)D2 SERPL-MCNC: 32 NG/ML

## 2018-03-29 PROCEDURE — 99204 OFFICE O/P NEW MOD 45 MIN: CPT | Mod: S$GLB,,, | Performed by: OBSTETRICS & GYNECOLOGY

## 2018-03-29 PROCEDURE — 3074F SYST BP LT 130 MM HG: CPT | Mod: CPTII,S$GLB,, | Performed by: OBSTETRICS & GYNECOLOGY

## 2018-03-29 PROCEDURE — 36415 COLL VENOUS BLD VENIPUNCTURE: CPT

## 2018-03-29 PROCEDURE — 3078F DIAST BP <80 MM HG: CPT | Mod: CPTII,S$GLB,, | Performed by: OBSTETRICS & GYNECOLOGY

## 2018-03-29 PROCEDURE — 82306 VITAMIN D 25 HYDROXY: CPT

## 2018-03-29 RX ORDER — TRAZODONE HYDROCHLORIDE 50 MG/1
50 TABLET ORAL NIGHTLY
Qty: 30 TABLET | Refills: 11 | Status: SHIPPED | OUTPATIENT
Start: 2018-03-29 | End: 2019-03-07 | Stop reason: SDUPTHER

## 2018-03-30 NOTE — PROGRESS NOTES
SUBJECTIVE:   52 y.o. female   for . No LMP recorded. Patient has had a hysterectomy.. -supracervical. She presents today for survivorship. .    she felt a mass in the left breast in 2017.    A mammogram was performed in December that was read as BI-RADS 4 and showed   2.9 cm irregularly shaped mass in the upper outer quadrant of the left breast.    A biopsy was performed on 2017 and showed an infiltrating ductal carcinoma   that was ER strongly positive, MI positive, and HER-2 negative by FISH.  The   patient was seen by Plastic Surgery and had surgery at [facility] on   2018.  The pathology report from the 2018 procedure indicates that   three sentinel lymph nodes were negative on the left.  On the left   nipple-sparing mastectomy specimen, there was a 3.4 cm tumor.  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  There was no evidence of DCIS or   malignancy on the right mastectomy specimen.  A MammaPrint test was sent and   suggested that she had a 97.8% chance of disease free survival at five years   with hormonal therapy.   She has concerns about a questionable thickening at the vaginal cuff seen on CT scan. She is awaiting to here about her treatment plan. She believes she had a BSO at the time of her supracervical hyst. They are awaiting records. If she had a BSO the recommendation is to start Anastrozole. She was having menopausal symptoms after her hyst and was taking ERT. She reports vaginal dryness, hot flashes, night sweats. She has questions about Black Cohosh.     Past Medical History:   Diagnosis Date    Breast cancer     Hypertension      Past Surgical History:   Procedure Laterality Date    BREAST LUMPECTOMY      deviated septum repair      HYSTERECTOMY      MASTECTOMY      OOPHORECTOMY       Social History     Social History    Marital status:      Spouse name: N/A    Number of children: N/A    Years of  education: N/A     Occupational History    Not on file.     Social History Main Topics    Smoking status: Never Smoker    Smokeless tobacco: Never Used    Alcohol use No    Drug use: No    Sexual activity: Yes     Partners: Male     Birth control/ protection: See Surgical Hx     Other Topics Concern    Not on file     Social History Narrative    No narrative on file     Family History   Problem Relation Age of Onset    Heart disease Father     Breast cancer Paternal Aunt 43    Breast cancer Paternal Aunt 62    Breast cancer Paternal Aunt 55    Colon cancer Paternal Uncle     Ovarian cancer Neg Hx     Cancer Neg Hx      OB History    Para Term  AB Living   2 2 2         SAB TAB Ectopic Multiple Live Births                  # Outcome Date GA Lbr Vijay/2nd Weight Sex Delivery Anes PTL Lv   2 Term            1 Term                       Current Outpatient Prescriptions   Medication Sig Dispense Refill    amLODIPine (NORVASC) 5 MG tablet TAKE 1 TABLET (5 MG TOTAL) BY MOUTH EVERY EVENING. 30 tablet 5    anastrozole (ARIMIDEX) 1 mg Tab Take 1 tablet (1 mg total) by mouth once daily. 90 tablet 2    cetirizine (ZYRTEC) 10 MG tablet Take 10 mg by mouth once daily.      lisinopril-hydrochlorothiazide (PRINZIDE,ZESTORETIC) 20-12.5 mg per tablet TAKE 1 TABLET BY MOUTH EVERY MORNING. 30 tablet 5    melatonin 1 mg Tab Take by mouth once daily.      MULTIVIT-IRON-MIN-FOLIC ACID 3,500-18-0.4 UNIT-MG-MG ORAL CHEW Take by mouth once daily.      traZODone (DESYREL) 50 MG tablet Take 1 tablet (50 mg total) by mouth nightly. 30 tablet 11     No current facility-administered medications for this visit.      Allergies: Sulfa (sulfonamide antibiotics)     The 10-year ASCVD risk score ([place] ZIEGLER Jr., et al., 2013) is: 2.3%    Values used to calculate the score:      Age: 52 years      Sex: Female      Is Non- : No      Diabetic: No      Tobacco smoker: No      Systolic Blood Pressure:  110 mmHg      Is BP treated: Yes      HDL Cholesterol: 43 mg/dL      Total Cholesterol: 215 mg/dL      ROS:  Constitutional: no weight loss, weight gain, fever, fatigue  Eyes:  No vision changes, glasses/contacts  ENT/Mouth: No ulcers, sinus problems, ears ringing, headache  Cardiovascular: No inability to lie flat, chest pain, exercise intolerance, swelling, heart palpitations  Respiratory: No wheezing, coughing blood, shortness of breath, or cough  Gastrointestinal: No diarrhea, bloody stool, nausea/vomiting, constipation, gas, hemorrhoids  Genitourinary: No blood in urine, painful urination, urgency of urination, frequency of urination, incomplete emptying, incontinence, abnormal bleeding, painful periods, heavy periods, vaginal discharge, vaginal odor, painful intercourse, sexual problems, bleeding after intercourse.  Musculoskeletal: No muscle weakness  Skin/Breast: No painful breasts, nipple discharge, masses, rash, ulcers  Neurological: No passing out, seizures, numbness, headache  Endocrine: No diabetes, hypothyroid, hyperthyroid, hot flashes, hair loss, abnormal hair growth, acne  Psychiatric: No depression, crying  Hematologic: No bruises, bleeding, swollen lymph nodes, anemia.      Physical Exam  General- well developed, well nourished  Vulva- no masses, no lesions  Vagina-  no masses, no lesions,+vaginal atrophy  Cervix- no Cervical motion tenderness, no lesions  Uterus- absent surgically  Adnexa- nontender, no masses      ASSESSMENT:   Breast cancer  Menopausal symptoms  Vaginal dryness  Sleep disturbance  Vaginal cuff thickening on CT scan    PLAN: Face to face time with patient 45 minutes - majority of time spent in counseling and arranging follow up  Counseled patient on different options for treatment of hot flashes- Effexor, Acupuncture, possible Testosterone pellets- if she is on AI- would not recommend starting until after she has had her 2nd surgery and recovered- 8 weeks out  Counseled patient  to not take Black cohosh  Trazodone - discussed possible side effects and benefits for sleep  Discussed lubricants- discussed risks/benefits of Intrarosa- will discuss with Dr. Boswell  Discussed importance of Vitamin D- discussed dosing, Will draw level today.  Will wait to hear from patient if she is starting AI  Counseled patient that ?thickening at vaginal cuff on CT scan is her cervix- she had a supracervical hysterectomy and will need pap smears in the future

## 2018-04-02 ENCOUNTER — PATIENT MESSAGE (OUTPATIENT)
Dept: HEMATOLOGY/ONCOLOGY | Facility: CLINIC | Age: 53
End: 2018-04-02

## 2018-04-05 DIAGNOSIS — C50.011 MALIGNANT NEOPLASM OF AREOLA OF RIGHT BREAST IN FEMALE, UNSPECIFIED ESTROGEN RECEPTOR STATUS: Primary | ICD-10-CM

## 2018-04-11 ENCOUNTER — TELEPHONE (OUTPATIENT)
Dept: HEMATOLOGY/ONCOLOGY | Facility: CLINIC | Age: 53
End: 2018-04-11

## 2018-04-11 ENCOUNTER — HOSPITAL ENCOUNTER (OUTPATIENT)
Dept: RADIOLOGY | Facility: HOSPITAL | Age: 53
Discharge: HOME | End: 2018-04-11
Attending: INTERNAL MEDICINE
Payer: COMMERCIAL

## 2018-04-11 DIAGNOSIS — C50.011 MALIGNANT NEOPLASM OF AREOLA OF RIGHT BREAST IN FEMALE, UNSPECIFIED ESTROGEN RECEPTOR STATUS: ICD-10-CM

## 2018-04-11 PROCEDURE — 76856 US EXAM PELVIC COMPLETE: CPT | Mod: 26,,, | Performed by: RADIOLOGY

## 2018-04-11 PROCEDURE — 76830 TRANSVAGINAL US NON-OB: CPT | Mod: TC

## 2018-04-11 PROCEDURE — 76830 TRANSVAGINAL US NON-OB: CPT | Mod: 26,,, | Performed by: RADIOLOGY

## 2018-04-11 NOTE — TELEPHONE ENCOUNTER
Called and left message for patient to give me a call back.  She can start her Arimidex and see Dr CONTRERAS in 4 to 6 weeks.

## 2018-04-12 ENCOUNTER — PATIENT MESSAGE (OUTPATIENT)
Dept: HEMATOLOGY/ONCOLOGY | Facility: CLINIC | Age: 53
End: 2018-04-12

## 2018-04-13 ENCOUNTER — PATIENT MESSAGE (OUTPATIENT)
Dept: SURGERY | Facility: CLINIC | Age: 53
End: 2018-04-13

## 2018-04-16 ENCOUNTER — DOCUMENTATION ONLY (OUTPATIENT)
Dept: SURGERY | Facility: CLINIC | Age: 53
End: 2018-04-16

## 2018-04-16 NOTE — PROGRESS NOTES
This note is to document this patient was previous counseled on results of her genetic testing, negative [employer] with [employer].

## 2018-05-03 ENCOUNTER — OFFICE VISIT (OUTPATIENT)
Dept: OBSTETRICS AND GYNECOLOGY | Facility: CLINIC | Age: 53
End: 2018-05-03
Attending: OBSTETRICS & GYNECOLOGY
Payer: COMMERCIAL

## 2018-05-03 VITALS
BODY MASS INDEX: 33.43 KG/M2 | WEIGHT: 165.81 LBS | DIASTOLIC BLOOD PRESSURE: 82 MMHG | SYSTOLIC BLOOD PRESSURE: 122 MMHG | HEIGHT: 59 IN

## 2018-05-03 DIAGNOSIS — N95.1 MENOPAUSAL SYMPTOM: Primary | ICD-10-CM

## 2018-05-03 DIAGNOSIS — C50.919 MALIGNANT NEOPLASM OF FEMALE BREAST, UNSPECIFIED ESTROGEN RECEPTOR STATUS, UNSPECIFIED LATERALITY, UNSPECIFIED SITE OF BREAST: ICD-10-CM

## 2018-05-03 DIAGNOSIS — N89.8 VAGINAL DRYNESS: ICD-10-CM

## 2018-05-03 PROCEDURE — 3008F BODY MASS INDEX DOCD: CPT | Mod: CPTII,S$GLB,, | Performed by: OBSTETRICS & GYNECOLOGY

## 2018-05-03 PROCEDURE — 3079F DIAST BP 80-89 MM HG: CPT | Mod: CPTII,S$GLB,, | Performed by: OBSTETRICS & GYNECOLOGY

## 2018-05-03 PROCEDURE — 99214 OFFICE O/P EST MOD 30 MIN: CPT | Mod: S$GLB,,, | Performed by: OBSTETRICS & GYNECOLOGY

## 2018-05-03 PROCEDURE — 3074F SYST BP LT 130 MM HG: CPT | Mod: CPTII,S$GLB,, | Performed by: OBSTETRICS & GYNECOLOGY

## 2018-05-03 RX ORDER — VENLAFAXINE HYDROCHLORIDE 37.5 MG/1
37.5 CAPSULE, EXTENDED RELEASE ORAL DAILY
Qty: 30 CAPSULE | Refills: 6 | Status: SHIPPED | OUTPATIENT
Start: 2018-05-03 | End: 2018-11-06 | Stop reason: SDUPTHER

## 2018-05-03 RX ORDER — CHOLECALCIFEROL (VITAMIN D3) 25 MCG
1000 TABLET ORAL DAILY
COMMUNITY
End: 2020-11-10

## 2018-05-03 NOTE — PROGRESS NOTES
SUBJECTIVE:   52 y.o. female   for survivorship follow up. . No LMP recorded. Patient has had a hysterectomy..  She reports still waking up at night- up between 2-6am because of feeling flushed. She reports not having these symptoms of menopause to this level until she was on Arimedex. . She has been on Arimedex for 3 weeks. She also reports joint pain and notices it more in the evenings and at night.she is using Sylk and that is helping with vaginal dryness. Sylk has helped with intercourse. She reports sporadic hot flashes during the day. She reports her fatigue level comes and goes.  .    Patient has had supracervical hyst/BSO    Past Medical History:   Diagnosis Date    Breast cancer     Hypertension      Past Surgical History:   Procedure Laterality Date    BREAST LUMPECTOMY      deviated septum repair      HYSTERECTOMY  2011    MASTECTOMY      OOPHORECTOMY       Social History     Social History    Marital status:      Spouse name: N/A    Number of children: N/A    Years of education: N/A     Occupational History    Not on file.     Social History Main Topics    Smoking status: Never Smoker    Smokeless tobacco: Never Used    Alcohol use No    Drug use: No    Sexual activity: Yes     Partners: Male     Birth control/ protection: See Surgical Hx     Other Topics Concern    Not on file     Social History Narrative    No narrative on file     Family History   Problem Relation Age of Onset    Heart disease Father     Breast cancer Paternal Aunt 43    Breast cancer Paternal Aunt 62    Breast cancer Paternal Aunt 55    Colon cancer Paternal Uncle     Ovarian cancer Neg Hx     Cancer Neg Hx      OB History    Para Term  AB Living   2 2 2         SAB TAB Ectopic Multiple Live Births                  # Outcome Date GA Lbr Vijay/2nd Weight Sex Delivery Anes PTL Lv   2 Term            1 Term                       Current Outpatient Prescriptions   Medication Sig  Dispense Refill    amLODIPine (NORVASC) 5 MG tablet TAKE 1 TABLET (5 MG TOTAL) BY MOUTH EVERY EVENING. 30 tablet 5    anastrozole (ARIMIDEX) 1 mg Tab Take 1 tablet (1 mg total) by mouth once daily. 90 tablet 2    cetirizine (ZYRTEC) 10 MG tablet Take 10 mg by mouth once daily.      lisinopril-hydrochlorothiazide (PRINZIDE,ZESTORETIC) 20-12.5 mg per tablet TAKE 1 TABLET BY MOUTH EVERY MORNING. 30 tablet 5    melatonin 1 mg Tab Take by mouth once daily.      MULTIVIT-IRON-MIN-FOLIC ACID 3,500-18-0.4 UNIT-MG-MG ORAL CHEW Take by mouth once daily.      traZODone (DESYREL) 50 MG tablet Take 1 tablet (50 mg total) by mouth nightly. 30 tablet 11    vitamin D 1000 units Tab Take 1,000 Units by mouth once daily.       No current facility-administered medications for this visit.      Allergies: Sulfa (sulfonamide antibiotics)     The 10-year ASCVD risk score ([place] ZIEGLER Jr., et al., 2013) is: 2.8%    Values used to calculate the score:      Age: 52 years      Sex: Female      Is Non- : No      Diabetic: No      Tobacco smoker: No      Systolic Blood Pressure: 122 mmHg      Is BP treated: Yes      HDL Cholesterol: 43 mg/dL      Total Cholesterol: 215 mg/dL      ROS:  Constitutional: no weight loss, weight gain, fever,+ fatigue  Eyes:  No vision changes, glasses/contacts  ENT/Mouth: No ulcers, sinus problems, ears ringing, headache  Cardiovascular: No inability to lie flat, chest pain, exercise intolerance, swelling, heart palpitations  Respiratory: No wheezing, coughing blood, shortness of breath, or cough  Gastrointestinal: No diarrhea, bloody stool, nausea/vomiting, constipation, gas, hemorrhoids  Genitourinary: No blood in urine, painful urination, urgency of urination, frequency of urination, incomplete emptying, incontinence, abnormal bleeding, painful periods, heavy periods, vaginal discharge, vaginal odor, painful intercourse, sexual problems, bleeding after intercourse.  Musculoskeletal: No  muscle weakness  Skin/Breast: No painful breasts, nipple discharge, masses, rash, ulcers  Neurological: No passing out, seizures, numbness, headache  Endocrine: No diabetes, hypothyroid, hyperthyroid, +hot flashes, hair loss, abnormal hair growth, acne  Psychiatric: No depression, crying  Hematologic: No bruises, bleeding, swollen lymph nodes, anemia.      Physical Exam  deferred    ASSESSMENT:   Menopausal symptoms  Fatigue    PLAN:   Face to face time 25 minutes  Counseled patient on options for AI side effects-menopausal symptoms and joint pain.  Discussed testosterone pellets and their safety and efficacy.   Patient to continue PT started after her 2nd surgery. She feels that she does not have full range of motion  Continue Sylk for vaginal dryness

## 2018-05-10 ENCOUNTER — PATIENT MESSAGE (OUTPATIENT)
Dept: HEMATOLOGY/ONCOLOGY | Facility: CLINIC | Age: 53
End: 2018-05-10

## 2018-05-10 ENCOUNTER — OFFICE VISIT (OUTPATIENT)
Dept: HEMATOLOGY/ONCOLOGY | Facility: CLINIC | Age: 53
End: 2018-05-10
Payer: COMMERCIAL

## 2018-05-10 VITALS
DIASTOLIC BLOOD PRESSURE: 88 MMHG | SYSTOLIC BLOOD PRESSURE: 146 MMHG | BODY MASS INDEX: 33.4 KG/M2 | WEIGHT: 165.38 LBS | TEMPERATURE: 99 F | HEART RATE: 106 BPM

## 2018-05-10 DIAGNOSIS — Z79.811 PROPHYLACTIC USE OF ANASTROZOLE (ARIMIDEX): ICD-10-CM

## 2018-05-10 DIAGNOSIS — C50.412 PRIMARY CANCER OF UPPER OUTER QUADRANT OF LEFT BREAST: Primary | ICD-10-CM

## 2018-05-10 PROCEDURE — 3079F DIAST BP 80-89 MM HG: CPT | Mod: CPTII,S$GLB,, | Performed by: INTERNAL MEDICINE

## 2018-05-10 PROCEDURE — 3077F SYST BP >= 140 MM HG: CPT | Mod: CPTII,S$GLB,, | Performed by: INTERNAL MEDICINE

## 2018-05-10 PROCEDURE — 3008F BODY MASS INDEX DOCD: CPT | Mod: CPTII,S$GLB,, | Performed by: INTERNAL MEDICINE

## 2018-05-10 PROCEDURE — 99999 PR PBB SHADOW E&M-EST. PATIENT-LVL III: CPT | Mod: PBBFAC,,, | Performed by: INTERNAL MEDICINE

## 2018-05-10 PROCEDURE — 99213 OFFICE O/P EST LOW 20 MIN: CPT | Mod: S$GLB,,, | Performed by: INTERNAL MEDICINE

## 2018-05-10 NOTE — PROGRESS NOTES
Subjective:       Patient ID: Natasha Espinal is a 52 y.o. female.    Chief Complaint: Primary cancer of upper outer quadrant of left breast    HPI     Mrs. Espinal returns today for follow up.  She has been on anastrazole for a month now.  She has tolerated it fairly.  Briefly, she is a 52-year-old  female who was referred for evaluation after undergoing bilateral nipple-sparing   mastectomies.   On the left nipple-sparing mastectomy specimen, there was a 3.4 cm tumor.  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  There was no evidence of DCIS or   malignancy on the right mastectomy specimen.  A MammaPrint test was sent and   suggested that she had a 97.8% chance of disease free survival at five years   with hormonal therapy.       Review of Systems    Overall she feels well, however, she has been experiencing hot flashes and hand / knee stiffness. She saw Dr. Phipps who started her on effexor.   ECOG PS is 1.  She denies any anxiety, depression, easy bruising, fevers, chills, night  sweats, weight loss, nausea, vomiting, diarrhea, constipation, diplopia, blurred vision, headache, chest pain, palpitations, shortness of breath, breast pain, abdominal pain, extremity pain, or difficulty ambulating.  The remainder of the ten-point ROS, including general, skin, lymph, H/N, cardiorespiratory, GI, , Neuro, Endocrine, and psychiatric is negative.       Objective:      Physical Exam        She is alert, oriented to time, place, person, pleasant, well nourished, in no acute physical distress.  She is here with a female friend.                                  VITAL SIGNS:  Reviewed                                      HEENT:  Normal.  There are no nasal, oral, lip, gingival, auricular, lid,    or conjunctival lesions.  Mucosae are moist and pink, and there is no        thrush.  Pupils are equal, reactive to light and accommodation.              Extraocular muscle movements are  intact.  Dentition is good.  There is no frontal or maxillary tenderness.                                     NECK:  Supple without JVD, adenopathy, or thyromegaly.                       LUNGS:  Clear to auscultation without wheezing, rales, or rhonchi.           CARDIOVASCULAR:  Reveals an S1, S2, no murmurs, no rubs, no gallops.         ABDOMEN:  Soft, nontender, without organomegaly.  Bowel sounds are    present.   Her SHRUTHI flap incision has healed nicely.                                                                 EXTREMITIES:  No cyanosis, clubbing, or edema.                               BREASTS:  She is status post bilateral nipples paring mastectomies.     Her incisions are well healed and no masses are palpated.                               LYMPHATIC:  There is no cervical, axillary, or supraclavicular adenopathy.   SKIN:  Warm and moist, without petechiae, rashes, induration, or ecchymoses.           NEUROLOGIC:  DTRs are 0-1+ bilaterally, symmetrical, motor function is 5/5,  and cranial nerves are  within normal limits.    Assessment:       1. Primary cancer of upper outer quadrant of left breast    2. Prophylactic use of anastrozole (Arimidex)        Plan:        I had a long discussion with her. She will remain on anastrazole and see me again in three months.  She was told to try NSAIDs for the AI-induced pains.    Her multiple questions were answered to her satisfaction.

## 2018-07-24 ENCOUNTER — HOSPITAL ENCOUNTER (OUTPATIENT)
Dept: RADIOLOGY | Facility: HOSPITAL | Age: 53
Discharge: HOME | End: 2018-07-24
Attending: SURGERY
Payer: COMMERCIAL

## 2018-07-24 ENCOUNTER — OFFICE VISIT (OUTPATIENT)
Dept: SURGERY | Facility: CLINIC | Age: 53
End: 2018-07-24
Payer: COMMERCIAL

## 2018-07-24 VITALS
HEIGHT: 59 IN | HEART RATE: 80 BPM | DIASTOLIC BLOOD PRESSURE: 83 MMHG | WEIGHT: 158.81 LBS | SYSTOLIC BLOOD PRESSURE: 123 MMHG | BODY MASS INDEX: 32.01 KG/M2 | TEMPERATURE: 99 F

## 2018-07-24 VITALS — HEIGHT: 59 IN | BODY MASS INDEX: 31.85 KG/M2 | WEIGHT: 158 LBS

## 2018-07-24 DIAGNOSIS — N63.10 BREAST MASS, RIGHT: ICD-10-CM

## 2018-07-24 DIAGNOSIS — N63.10 BREAST MASS, RIGHT: Primary | ICD-10-CM

## 2018-07-24 PROCEDURE — 76642 ULTRASOUND BREAST LIMITED: CPT | Mod: TC,PO,RT

## 2018-07-24 PROCEDURE — 3074F SYST BP LT 130 MM HG: CPT | Mod: CPTII,S$GLB,, | Performed by: SURGERY

## 2018-07-24 PROCEDURE — 76642 ULTRASOUND BREAST LIMITED: CPT | Mod: 26,RT,, | Performed by: RADIOLOGY

## 2018-07-24 PROCEDURE — 77065 DX MAMMO INCL CAD UNI: CPT | Mod: 26,,, | Performed by: RADIOLOGY

## 2018-07-24 PROCEDURE — 99999 PR PBB SHADOW E&M-EST. PATIENT-LVL III: CPT | Mod: PBBFAC,,, | Performed by: SURGERY

## 2018-07-24 PROCEDURE — 3079F DIAST BP 80-89 MM HG: CPT | Mod: CPTII,S$GLB,, | Performed by: SURGERY

## 2018-07-24 PROCEDURE — 77065 DX MAMMO INCL CAD UNI: CPT | Mod: TC,PO

## 2018-07-24 PROCEDURE — 77061 BREAST TOMOSYNTHESIS UNI: CPT | Mod: TC,PO

## 2018-07-24 PROCEDURE — 99214 OFFICE O/P EST MOD 30 MIN: CPT | Mod: S$GLB,,, | Performed by: SURGERY

## 2018-07-24 PROCEDURE — 3008F BODY MASS INDEX DOCD: CPT | Mod: CPTII,S$GLB,, | Performed by: SURGERY

## 2018-07-24 PROCEDURE — 77061 BREAST TOMOSYNTHESIS UNI: CPT | Mod: 26,,, | Performed by: RADIOLOGY

## 2018-07-24 NOTE — MEDICAL/APP STUDENT
[facility]  [facility]  History & Physical    Patient Name: Natasha Espinal  MRN: [id number]  Admission Date: 07/24/18  Attending Physician: Emeterio Leon MD  Primary Care Provider: Bang Snowden MD    Patient information was obtained from patient and ER records.     Subjective:     Chief Complaint/Reason for Admission: Palpable R. Breast Mass of Lower         Current Outpatient Prescriptions on File Prior to Visit   Medication Sig    amLODIPine (NORVASC) 5 MG tablet TAKE 1 TABLET (5 MG TOTAL) BY MOUTH EVERY EVENING.    anastrozole (ARIMIDEX) 1 mg Tab Take 1 tablet (1 mg total) by mouth once daily.    cetirizine (ZYRTEC) 10 MG tablet Take 10 mg by mouth once daily.    lisinopril-hydrochlorothiazide (PRINZIDE,ZESTORETIC) 20-12.5 mg per tablet TAKE 1 TABLET BY MOUTH EVERY MORNING.    melatonin 1 mg Tab Take by mouth once daily.    MULTIVIT-IRON-MIN-FOLIC ACID 3,500-18-0.4 UNIT-MG-MG ORAL CHEW Take by mouth once daily.    traZODone (DESYREL) 50 MG tablet Take 1 tablet (50 mg total) by mouth nightly.    venlafaxine (EFFEXOR XR) 37.5 MG 24 hr capsule Take 1 capsule (37.5 mg total) by mouth once daily.    vitamin D 1000 units Tab Take 1,000 Units by mouth once daily.     No current facility-administered medications on file prior to visit.        Review of patient's allergies indicates:   Allergen Reactions    Sulfa (sulfonamide antibiotics) Nausea Only       Past Medical History:   Diagnosis Date    Breast cancer     Hypertension      Past Surgical History:   Procedure Laterality Date    BREAST LUMPECTOMY  1983    deviated septum repair      HYSTERECTOMY  2011    MASTECTOMY      OOPHORECTOMY       Family History     Problem Relation (Age of Onset)    Breast cancer Paternal Aunt (43), Paternal Aunt (62), Paternal Aunt (55)    Colon cancer Paternal Uncle    Heart disease Father        Social History Main Topics    Smoking status: Never Smoker    Smokeless tobacco: Never Used     Alcohol use No    Drug use: No    Sexual activity: Yes     Partners: Male     Birth control/ protection: See Surgical Hx     Review of Systems  Objective:     Vital Signs (Most Recent):  Temp: 98.5 °F (36.9 °C) (07/24/18 0941)  Pulse: 80 (07/24/18 0941)  BP: 123/83 (07/24/18 0941) Vital Signs (24h Range):  [unfilled]     Weight: 72 kg (158 lb 13.5 oz)  Body mass index is 32.08 kg/m².    Physical Exam        Assessment/Plan:     There are no hospital problems to display for this patient.    VTE Risk Mitigation     None          Olive Linares  General Surgery  [facility]  Pt likely with fat necrosis RUOQ

## 2018-07-24 NOTE — LETTER
[facility]  [address]  [facility] [address]  Phone: [phone]  Fax: [phone] July 29, 2018      Benjamin Gentile MD  [address] [facility] [address]    Patient: Natasha Espinal   MR Number: [id number]   YOB: 1965   Date of Visit: 7/24/2018     Dear Dr. Gentile:    Thank you for referring Natasha Espinal to me for evaluation. Attached you will find relevant portions of my assessment and plan of care.    Clinically, findings in the RUOQ axillary tail are for fat necrosis given original benign pathology on that side as well as location of the density along the perimeter of the SHRUTHI flap as well as clinical symptoms of some pain and tederness associated with it.    Right diag MMG and R US of R reconstructed breast were WNL with no suspicious radiographic findings in and around and over area of palpable concern.  Findings clinically and radiographically consistent with fat necrosis.    Patient advised to have, Dr. Gentile, her plastic surgeon, excise that area in RUOQ at time of next plastic surgical revision if she would like for diagnostic and therapeutic purposes since it is somewhat painful and tender to her.    Pt reassured that area is likely fat necrosis.  MADHU.  She will otherwise f/u with me in 6 months as part of her ongoing breast CA screening and surveillance follow-up.    If you have questions, please do not hesitate to call me. I look forward to following Natasha Espinal along with you.    Sincerely,    Emeterio Raymundo MD  Medical Director, [facility]  Staff Attending Surgeon - Department of Surgery  [facility]  Associate Professor of Surgery  [employer]  [employer]    RL[facility]/hcr

## 2018-07-29 NOTE — PROGRESS NOTES
Patient presents today with mass in R UOQ axillary tail region.    Hx:  She underwent bilateral nipple-sparing mastectomy by me with   immediate autologous tissue bilateral SHRUTHI flap reconstructions by Dr. Benjamin Gentile at [facility] on 02/07/2018.  Final   pathology revealed benign findings on the right.  The left side revealed a known  T2 3.4 cm low-grade invasive carcinoma of the breast with three negative   sentinel nodes making this a T2 N0 stage IIA.  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    The right breast pathology was benign.  She did not be require adjuvant radiotherapy given the stage II diagnosis.  She   underwent genetics testing, which was negative.  We also performed a MammaPrint   assessment for genomic analysis, which was low risk, so she was not   recommended for any adjuvant cytotoxic chemotherapy in terms of cytotoxic systemic   chemotherapy.  She was be recommended for adjuvant endocrine therapy  And has been on AI therapy per Dr. Olivier.    Pt reports hot flashes and menopausal symptoms with hand and knee pain and stiffness.  Pt remains on AI therapy per Dr. Boswell and had been placed on Effexor for menopausal sx and recommended to use Sylk for vaginal dryness.  She reports a 1 cm density in the RUOQ of the reconstructed breast near the axilla with some associated pain and tenderness.  This was the benign side    Review of Systems    Overall she feels well, however, she has been experiencing hot flashes and hand / knee stiffness. She saw Dr. Phipps who started her on effexor.   ECOG PS is 1.  She denies any anxiety, depression, easy bruising, fevers, chills, night  sweats, weight loss, nausea, vomiting, diarrhea, constipation, diplopia, blurred vision, headache, chest pain, palpitations, shortness of breath, breast pain,  abdominal pain, extremity pain, or difficulty ambulating.  The remainder of the ten-point ROS, including general, skin, lymph, H/N, cardiorespiratory, GI, , Neuro, Endocrine, and psychiatric is negative.         Objective:   Physical Exam       She is alert, oriented to time, place, person, pleasant, well nourished, in no acute physical distress.                                  VITAL SIGNS:  Reviewed                                      HEENT:  Normal.  There are no nasal, oral, lip, gingival, auricular, lid,    or conjunctival lesions.  Mucosae are moist and pink, and there is no        thrush.  Pupils are equal, reactive to light and accommodation.              Extraocular muscle movements are intact.  Dentition is good.  There is no frontal or maxillary tenderness.                                     NECK:  Supple without JVD, adenopathy, or thyromegaly.                       LUNGS:  Clear to auscultation without wheezing, rales, or rhonchi.           CARDIOVASCULAR:  Reveals an S1, S2, no murmurs, no rubs, no gallops.         ABDOMEN:  Soft, nontender, without organomegaly.  Bowel sounds are    present.   Her SHRUTHI flap incision has healed nicely.                                                                 EXTREMITIES:  No cyanosis, clubbing, or edema.                               BREASTS:  She is status post bilateral nipples paring mastectomies.     Her incisions are well healed and no masses are palpated.     There is a 1 cm density, slightly tender, in the RUOQ axillary tail region which may be related to fat necrosis.  No suspicious skin changes. R reconstrucuted breast s/p B NSM slightly larger than the L.  No LAD.                            LYMPHATIC:  There is no cervical, axillary, or supraclavicular adenopathy.   SKIN:  Warm and moist, without petechiae, rashes, induration, or ecchymoses.           NEUROLOGIC:  DTRs are 0-1+ bilaterally, symmetrical, motor function is 5/5,  and cranial nerves  are  within normal limits.     1 cm density/mass in the RUOQ axillary tail of the R breast which is at the perimeter of the SHRUTHI flap and is clinically consistent with fat necrosis.  Pt reports subjectively that she had had an attempt at breast reconstruction revision by Dr. gentile aborted because of some questionable issues with integrity of blood suppley to skin and NAC.  Pt states she is pending another revision as the R side is slightly larger than the L side and she would like to achieve better symmetry.    A/P:  Clinically findings in the RUOQ axillary tail are for fat necrosis given original benign pathology on that side as well as location of the density along the perimeter of the SHRUTHI flap as well as clinical sx of some pain and tederness associated with it.    R diag MMG and R US of R reconstructed breast were WNL with no suspicious radiographic findings in and around and over area of palpable concern.  Findings clinically and radiographically consistent with fat necrosis.  Pt advised to have, Dr. Gentile, her plastic surgeon, excise that area in RUOQ at time of next plastic surgical revision if she would like for diagnostic and therapeutic purposes since it is somewhat painful and tender to her.  Pt reassured that area is likely fat necrosis.  MADHU  She will otherwise f/u with me in 6 months as part of her ongoing breast CA screening and surveillance follow-up.

## 2018-08-22 NOTE — PROGRESS NOTES
Subjective:       Patient ID: Natasha Espinal is a 52 y.o. female.    Chief Complaint: No chief complaint on file.    HPI     Mrs. Espinal returns today for follow up.  She has been on anastrazole for 4 months now.  She has tolerated it fairly.  Briefly, she is a 52-year-old  female who was referred for evaluation after undergoing bilateral nipple-sparing   mastectomies.   On the left nipple-sparing mastectomy specimen, there was a 3.4 cm tumor.  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  There was no evidence of DCIS or malignancy on the right mastectomy specimen.  A MammaPrint test was sent and   suggested that she had a 97.8% chance of disease free survival at five years with hormonal therapy.       Review of Systems    Overall she feels OK.  She states that the hot flashes have improved, but she is still experiencing intermittent hand / finger stiffness.    ECOG PS is 1.  She denies any anxiety, depression, easy bruising, fevers, chills, night  sweats, weight loss, nausea, vomiting, diarrhea, constipation, diplopia, blurred vision, headache, chest pain, palpitations, shortness of breath, breast pain, abdominal pain, extremity pain, or difficulty ambulating.  The remainder of the ten-point ROS, including general, skin, lymph, H/N, cardiorespiratory, GI, , Neuro, Endocrine, and psychiatric is negative.       Objective:      Physical Exam        She is alert, oriented to time, place, person, pleasant, well nourished, in no acute physical distress.                                VITAL SIGNS:  Reviewed                                      HEENT:  Normal.  There are no nasal, oral, lip, gingival, auricular, lid,    or conjunctival lesions.  Mucosae are moist and pink, and there is no        thrush.  Pupils are equal, reactive to light and accommodation.              Extraocular muscle movements are intact.  Dentition is good.  There is no frontal or maxillary tenderness.                                      NECK:  Supple without JVD, adenopathy, or thyromegaly.                       LUNGS:  Clear to auscultation without wheezing, rales, or rhonchi.           CARDIOVASCULAR:  Reveals an S1, S2, no murmurs, no rubs, no gallops.         ABDOMEN:  Soft, nontender, without organomegaly.  Bowel sounds are    present.   Her SHRUTHI flap incision has healed nicely.                                                                 EXTREMITIES:  No cyanosis, clubbing, or edema.                               BREASTS:  She is status post bilateral nipples paring mastectomies.     Her incisions are well healed and no masses are palpated.                               LYMPHATIC:  There is no cervical, axillary, or supraclavicular adenopathy.   SKIN:  Warm and moist, without petechiae, rashes, induration, or ecchymoses.           NEUROLOGIC:  DTRs are 0-1+ bilaterally, symmetrical, motor function is 5/5,  and cranial nerves are  within normal limits.    Assessment:       1. Primary cancer of upper outer quadrant of left breast    2. Prophylactic use of anastrozole (Arimidex)      3.    Musculoskeletal symptoms secondary to AIs.   Plan:        I had a long discussion with her. She will remain on anastrazole through the end of March 2023 and see me again in three months.  She was again told to try NSAIDs for the AI-induced pains.    Her multiple questions were answered to her satisfaction.

## 2018-08-23 ENCOUNTER — OFFICE VISIT (OUTPATIENT)
Dept: HEMATOLOGY/ONCOLOGY | Facility: CLINIC | Age: 53
End: 2018-08-23
Payer: COMMERCIAL

## 2018-08-23 VITALS
TEMPERATURE: 99 F | RESPIRATION RATE: 16 BRPM | WEIGHT: 159.19 LBS | HEIGHT: 59 IN | HEART RATE: 109 BPM | BODY MASS INDEX: 32.09 KG/M2 | OXYGEN SATURATION: 94 % | SYSTOLIC BLOOD PRESSURE: 139 MMHG | DIASTOLIC BLOOD PRESSURE: 77 MMHG

## 2018-08-23 DIAGNOSIS — C50.412 PRIMARY CANCER OF UPPER OUTER QUADRANT OF LEFT BREAST: Primary | ICD-10-CM

## 2018-08-23 DIAGNOSIS — Z79.811 PROPHYLACTIC USE OF ANASTROZOLE (ARIMIDEX): ICD-10-CM

## 2018-08-23 PROCEDURE — 3078F DIAST BP <80 MM HG: CPT | Mod: CPTII,S$GLB,, | Performed by: INTERNAL MEDICINE

## 2018-08-23 PROCEDURE — 99213 OFFICE O/P EST LOW 20 MIN: CPT | Mod: S$GLB,,, | Performed by: INTERNAL MEDICINE

## 2018-08-23 PROCEDURE — 3075F SYST BP GE 130 - 139MM HG: CPT | Mod: CPTII,S$GLB,, | Performed by: INTERNAL MEDICINE

## 2018-08-23 PROCEDURE — 99999 PR PBB SHADOW E&M-EST. PATIENT-LVL III: CPT | Mod: PBBFAC,,, | Performed by: INTERNAL MEDICINE

## 2018-08-23 PROCEDURE — 3008F BODY MASS INDEX DOCD: CPT | Mod: CPTII,S$GLB,, | Performed by: INTERNAL MEDICINE

## 2018-08-23 RX ORDER — AMOXICILLIN 500 MG
1 CAPSULE ORAL
COMMUNITY

## 2018-08-23 RX ORDER — ASCORBATE CALCIUM 500 MG
1 TABLET ORAL
COMMUNITY

## 2018-08-23 RX ORDER — ERGOCALCIFEROL (VITAMIN D2) 10 MCG
TABLET ORAL
COMMUNITY
End: 2019-07-19

## 2018-10-03 ENCOUNTER — OFFICE VISIT (OUTPATIENT)
Dept: URGENT CARE | Facility: CLINIC | Age: 53
End: 2018-10-03
Payer: COMMERCIAL

## 2018-10-03 VITALS
BODY MASS INDEX: 31.85 KG/M2 | TEMPERATURE: 98 F | RESPIRATION RATE: 18 BRPM | HEART RATE: 90 BPM | OXYGEN SATURATION: 97 % | HEIGHT: 59 IN | DIASTOLIC BLOOD PRESSURE: 88 MMHG | WEIGHT: 158 LBS | SYSTOLIC BLOOD PRESSURE: 134 MMHG

## 2018-10-03 DIAGNOSIS — J40 BRONCHITIS: Primary | ICD-10-CM

## 2018-10-03 LAB
CTP QC/QA: YES
FLUAV AG NPH QL: NEGATIVE
FLUBV AG NPH QL: NEGATIVE

## 2018-10-03 PROCEDURE — 99214 OFFICE O/P EST MOD 30 MIN: CPT | Mod: S$GLB,,, | Performed by: NURSE PRACTITIONER

## 2018-10-03 PROCEDURE — 87804 INFLUENZA ASSAY W/OPTIC: CPT | Mod: QW,S$GLB,, | Performed by: NURSE PRACTITIONER

## 2018-10-03 PROCEDURE — 3075F SYST BP GE 130 - 139MM HG: CPT | Mod: CPTII,S$GLB,, | Performed by: NURSE PRACTITIONER

## 2018-10-03 PROCEDURE — 71046 X-RAY EXAM CHEST 2 VIEWS: CPT | Mod: FY,S$GLB,, | Performed by: RADIOLOGY

## 2018-10-03 PROCEDURE — 3079F DIAST BP 80-89 MM HG: CPT | Mod: CPTII,S$GLB,, | Performed by: NURSE PRACTITIONER

## 2018-10-03 RX ORDER — OMEPRAZOLE 20 MG/1
20 CAPSULE, DELAYED RELEASE ORAL DAILY
Refills: 0 | COMMUNITY
Start: 2018-09-24 | End: 2019-07-19

## 2018-10-03 RX ORDER — BENZONATATE 100 MG/1
100 CAPSULE ORAL EVERY 6 HOURS PRN
Qty: 30 CAPSULE | Refills: 1 | Status: SHIPPED | OUTPATIENT
Start: 2018-10-03 | End: 2019-07-19

## 2018-10-03 RX ORDER — ANASTROZOLE 1 MG/1
1 TABLET ORAL
COMMUNITY
Start: 2018-06-29 | End: 2018-10-03

## 2018-10-03 RX ORDER — LISINOPRIL AND HYDROCHLOROTHIAZIDE 12.5; 2 MG/1; MG/1
1 TABLET ORAL
COMMUNITY
Start: 2018-07-22 | End: 2019-04-02 | Stop reason: SDUPTHER

## 2018-10-03 RX ORDER — TRAZODONE HYDROCHLORIDE 50 MG/1
1 TABLET ORAL
COMMUNITY
Start: 2018-07-21 | End: 2019-07-19 | Stop reason: SDUPTHER

## 2018-10-03 RX ORDER — AZITHROMYCIN 250 MG/1
TABLET, FILM COATED ORAL
Qty: 6 TABLET | Refills: 0 | Status: SHIPPED | OUTPATIENT
Start: 2018-10-03 | End: 2019-07-19

## 2018-10-03 RX ORDER — TALC
1 POWDER (GRAM) TOPICAL
COMMUNITY
End: 2019-07-19

## 2018-10-03 RX ORDER — SODIUM CHLORIDE FOR INHALATION 0.9 %
VIAL, NEBULIZER (ML) INHALATION
COMMUNITY
Start: 2018-08-17 | End: 2019-07-19

## 2018-10-03 RX ORDER — VENLAFAXINE HYDROCHLORIDE 37.5 MG/1
1 CAPSULE, EXTENDED RELEASE ORAL
COMMUNITY
Start: 2018-08-01 | End: 2019-07-19 | Stop reason: SDUPTHER

## 2018-10-03 RX ORDER — XYLITOL
POWDER (GRAM) MISCELLANEOUS
COMMUNITY
Start: 2018-08-17 | End: 2019-07-19

## 2018-10-03 NOTE — PATIENT INSTRUCTIONS
"Please follow up with your Primary care provider within 2-5 days if your signs and symptoms have not resolved or worsen.  The usual course of cold symptoms are 10-14 days.     If your condition worsens or fails to improve we recommend that you receive another evaluation at the emergency room immediately or contact your primary medical clinic to discuss your concerns.     You must understand that you have received an Urgent Care treatment only and that you may be released before all of your medical problems are known or treated.   You, the patient, will arrange for follow up care as instructed.     Tylenol or Ibuprofen can also be used as directed for pain/fever unless you have an allergy to them or medical condition such as stomach ulcers, kidney or liver disease or blood thinners etc for which you should not be taking these type of medications.     Take over the counter cough medication as directed as needed for cough.  You should avoid medications with pseudoephedrine or phenylephrine (any medication with "D") if you have high blood pressure as this can cause an elevation in your blood pressure. Instead consider Corcidin HBP as needed to prevent an elevated blood pressure.     Natural remedies of symptoms (as needed) include humidification, saline nasal sprays, and/or steamy showers.  Increase fluids, warm tea with honey, cough drops as needed.  You may also use salt water gargles for sore throat.    IF you received a steroid shot today - As discussed, this can elevate your blood pressure, elevate your blood sugar, water weight gain, nervous energy, redness to the face and dimpling of the skin at the injection site.     You have been given an antibiotic to treat your condition today.  Please complete the antibiotic as directed on the bottle.     As with any antibiotics, use probiotics and/or high culture yogurt about 2 hours apart from the antibiotic and about 1 week after the antibiotic to replace the gut yandel " lost with antibiotic use.      If you are female and on BCP use additional methods to prevent pregnancy while on antibiotics and for one cycle after.         Acute Bronchitis  Your healthcare provider has told you that you have acute bronchitis. Bronchitis is infection or inflammation of the bronchial tubes (airways in the lungs). Normally, air moves easily in and out of the airways. Bronchitis narrows the airways, making it harder for air to flow in and out of the lungs. This causes symptoms such as shortness of breath, coughing up yellow or green mucus, and wheezing. Bronchitis can be acute or chronic. Acute means the condition comes on quickly and goes away in a short time, usually within 3 to 10 days. Chronic means a condition lasts a long time and often comes back.    What causes acute bronchitis?  Acute bronchitis almost always starts as a viral respiratory infection, such as a cold or the flu. Certain factors make it more likely for a cold or flu to turn into bronchitis. These include being very young, being elderly, having a heart or lung problem, or having a weak immune system. Cigarette smoking also makes bronchitis more likely.  When bronchitis develops, the airways become swollen. The airways may also become infected with bacteria. This is known as a secondary infection.  Diagnosing acute bronchitis  Your healthcare provider will examine you and ask about your symptoms and health history. You may also have a sputum culture to test the fluid in your lungs. Chest X-rays may be done to look for infection in the lungs.  Treating acute bronchitis  Bronchitis usually clears up as the cold or flu goes away. You can help feel better faster by doing the following:  · Take medicine as directed. You may be told to take ibuprofen or other over-the-counter medicines. These help relieve inflammation in your bronchial tubes. Your healthcare provider may prescribe an inhaler to help open up the bronchial tubes. Most of  the time, acute bronchitis is caused by a viral infection. Antibiotics are usually not prescribed for viral infections.  · Drink plenty of fluids, such as water, juice, or warm soup. Fluids loosen mucus so that you can cough it up. This helps you breathe more easily. Fluids also prevent dehydration.  · Make sure you get plenty of rest.  · Do not smoke. Do not allow anyone else to smoke in your home.  Recovery and follow-up  Follow up with your doctor as you are told. You will likely feel better in a week or two. But a dry cough can linger beyond that time. Let your doctor know if you still have symptoms (other than a dry cough) after 2 weeks, or if youre prone to getting bronchial infections. Take steps to protect yourself from future infections. These steps include stopping smoking and avoiding tobacco smoke, washing your hands often, and getting a yearly flu shot.  When to call your healthcare provider  Call the healthcare provider if you have any of the following:  · Fever of 100.4°F (38.0°C) or higher, or as advised  · Symptoms that get worse, or new symptoms  · Trouble breathing  · Symptoms that dont start to improve within a week, or within 3 days of taking antibiotics   Date Last Reviewed: 12/1/2016  © 8592-6895 [employer]. [address] [place] [address], [place] [address]. All rights reserved. This information is not intended as a substitute for professional medical care. Always follow your healthcare professional's instructions.

## 2018-10-03 NOTE — PROGRESS NOTES
"Subjective:       Patient ID: Natasha Espinal is a 52 y.o. female.    Vitals:  height is 4' 11" (1.499 m) and weight is 71.7 kg (158 lb). Her oral temperature is 97.8 °F (36.6 °C). Her blood pressure is 134/88 and her pulse is 90. Her respiration is 18 and oxygen saturation is 97%.     Chief Complaint: Cough    Patient recently had surgery on her airway , and now has a sputum producing cough for almost five days. ( yellow sputum ) Patient states she has a slight sore throat but believes it was from the surgery. She was instructed to come here for chest x ray by the [facility].       Cough   This is a new problem. The current episode started in the past 7 days. The problem has been gradually worsening. The problem occurs hourly. The cough is productive of sputum. Associated symptoms include a sore throat. Pertinent negatives include no chest pain, chills, ear pain, eye redness, fever, headaches, myalgias, shortness of breath or wheezing. Nothing aggravates the symptoms. Risk factors: recent surgery  She has tried nothing for the symptoms. The treatment provided no relief. Her past medical history is significant for asthma.     Review of Systems   Constitution: Negative for chills, fever and malaise/fatigue.   HENT: Positive for congestion, hoarse voice and sore throat. Negative for ear pain.    Eyes: Negative for discharge and redness.   Cardiovascular: Negative for chest pain, dyspnea on exertion and leg swelling.   Respiratory: Positive for cough and sputum production. Negative for shortness of breath and wheezing.    Musculoskeletal: Negative for myalgias.   Gastrointestinal: Negative for abdominal pain and nausea.   Neurological: Negative for headaches.       Objective:      Physical Exam   Constitutional: She is oriented to person, place, and time. She appears well-developed and well-nourished. She is cooperative.  Non-toxic appearance. She does not appear ill. No distress.   HENT:   Head: Normocephalic " and atraumatic.   Right Ear: Hearing, tympanic membrane, external ear and ear canal normal.   Left Ear: Hearing, tympanic membrane, external ear and ear canal normal.   Nose: Mucosal edema present. No rhinorrhea or nasal deformity. No epistaxis. Right sinus exhibits no maxillary sinus tenderness and no frontal sinus tenderness. Left sinus exhibits no maxillary sinus tenderness and no frontal sinus tenderness.   Mouth/Throat: Uvula is midline, oropharynx is clear and moist and mucous membranes are normal. No trismus in the jaw. Normal dentition. No uvula swelling. No posterior oropharyngeal erythema.   Eyes: Conjunctivae and lids are normal. No scleral icterus.   Sclera clear bilat   Neck: Trachea normal, full passive range of motion without pain and phonation normal. Neck supple.   Cardiovascular: Normal rate, regular rhythm, S1 normal, S2 normal, normal heart sounds, intact distal pulses and normal pulses.   Pulmonary/Chest: Effort normal. No stridor. No respiratory distress. She has no decreased breath sounds. She has no wheezes. She has rhonchi (mild) in the right upper field and the left upper field. She has no rales.   Abdominal: Soft. Normal appearance and bowel sounds are normal. She exhibits no distension. There is no tenderness.   Musculoskeletal: Normal range of motion. She exhibits no edema or deformity.   Lymphadenopathy:     She has no cervical adenopathy.        Right cervical: No superficial cervical, no deep cervical and no posterior cervical adenopathy present.       Left cervical: No superficial cervical, no deep cervical and no posterior cervical adenopathy present.   Neurological: She is alert and oriented to person, place, and time. She exhibits normal muscle tone. Coordination normal.   Skin: Skin is warm, dry and intact. Capillary refill takes less than 2 seconds. No rash noted. She is not diaphoretic. No cyanosis. No pallor. Nails show no clubbing.   Psychiatric: She has a normal mood and  affect. Her speech is normal and behavior is normal. Judgment and thought content normal. Cognition and memory are normal.   Nursing note and vitals reviewed.      Assessment:       1. Bronchitis        Plan:       X-ray Chest Pa And Lateral    Result Date: 10/3/2018  EXAMINATION: XR CHEST PA AND LATERAL CLINICAL HISTORY: Cough TECHNIQUE: PA and lateral views of the chest were performed. COMPARISON: 01/29/2018 FINDINGS: The cardiomediastinal silhouette is not enlarged.  Interval postsurgical changes are noted of the thorax.  There is stable elevation of the right hemidiaphragm..  There is no pleural effusion.  The trachea is midline.  The lungs are symmetrically expanded bilaterally without evidence of acute parenchymal process. No large focal consolidation seen.  There is no pneumothorax.  The osseous structures are remarkable for degenerative change..     1. No acute cardiopulmonary process.   Electronically signed by: Enmanuel Rowland MD Date:    10/03/2018 Time:    16:01    Bronchitis  -     X-Ray Chest PA And Lateral; Future; Expected date: 10/03/2018  -     POCT Influenza A/B  -     azithromycin (ZITHROMAX Z-CATRACHO) 250 MG tablet; Take 2 tablets (500 mg) on  Day 1,  followed by 1 tablet (250 mg) once daily on Days 2 through 5.  Dispense: 6 tablet; Refill: 0  -     benzonatate (TESSALON PERLES) 100 MG capsule; Take 1 capsule (100 mg total) by mouth every 6 (six) hours as needed for Cough.  Dispense: 30 capsule; Refill: 1      Patient Instructions     Please follow up with your Primary care provider within 2-5 days if your signs and symptoms have not resolved or worsen.  The usual course of cold symptoms are 10-14 days.     If your condition worsens or fails to improve we recommend that you receive another evaluation at the emergency room immediately or contact your primary medical clinic to discuss your concerns.     You must understand that you have received an Urgent Care treatment only and that you may be  "released before all of your medical problems are known or treated.   You, the patient, will arrange for follow up care as instructed.     Tylenol or Ibuprofen can also be used as directed for pain/fever unless you have an allergy to them or medical condition such as stomach ulcers, kidney or liver disease or blood thinners etc for which you should not be taking these type of medications.     Take over the counter cough medication as directed as needed for cough.  You should avoid medications with pseudoephedrine or phenylephrine (any medication with "D") if you have high blood pressure as this can cause an elevation in your blood pressure. Instead consider Corcidin HBP as needed to prevent an elevated blood pressure.     Natural remedies of symptoms (as needed) include humidification, saline nasal sprays, and/or steamy showers.  Increase fluids, warm tea with honey, cough drops as needed.  You may also use salt water gargles for sore throat.    IF you received a steroid shot today - As discussed, this can elevate your blood pressure, elevate your blood sugar, water weight gain, nervous energy, redness to the face and dimpling of the skin at the injection site.     You have been given an antibiotic to treat your condition today.  Please complete the antibiotic as directed on the bottle.     As with any antibiotics, use probiotics and/or high culture yogurt about 2 hours apart from the antibiotic and about 1 week after the antibiotic to replace the gut yandel lost with antibiotic use.      If you are female and on BCP use additional methods to prevent pregnancy while on antibiotics and for one cycle after.         Acute Bronchitis  Your healthcare provider has told you that you have acute bronchitis. Bronchitis is infection or inflammation of the bronchial tubes (airways in the lungs). Normally, air moves easily in and out of the airways. Bronchitis narrows the airways, making it harder for air to flow in and out of " the lungs. This causes symptoms such as shortness of breath, coughing up yellow or green mucus, and wheezing. Bronchitis can be acute or chronic. Acute means the condition comes on quickly and goes away in a short time, usually within 3 to 10 days. Chronic means a condition lasts a long time and often comes back.    What causes acute bronchitis?  Acute bronchitis almost always starts as a viral respiratory infection, such as a cold or the flu. Certain factors make it more likely for a cold or flu to turn into bronchitis. These include being very young, being elderly, having a heart or lung problem, or having a weak immune system. Cigarette smoking also makes bronchitis more likely.  When bronchitis develops, the airways become swollen. The airways may also become infected with bacteria. This is known as a secondary infection.  Diagnosing acute bronchitis  Your healthcare provider will examine you and ask about your symptoms and health history. You may also have a sputum culture to test the fluid in your lungs. Chest X-rays may be done to look for infection in the lungs.  Treating acute bronchitis  Bronchitis usually clears up as the cold or flu goes away. You can help feel better faster by doing the following:  · Take medicine as directed. You may be told to take ibuprofen or other over-the-counter medicines. These help relieve inflammation in your bronchial tubes. Your healthcare provider may prescribe an inhaler to help open up the bronchial tubes. Most of the time, acute bronchitis is caused by a viral infection. Antibiotics are usually not prescribed for viral infections.  · Drink plenty of fluids, such as water, juice, or warm soup. Fluids loosen mucus so that you can cough it up. This helps you breathe more easily. Fluids also prevent dehydration.  · Make sure you get plenty of rest.  · Do not smoke. Do not allow anyone else to smoke in your home.  Recovery and follow-up  Follow up with your doctor as you are  told. You will likely feel better in a week or two. But a dry cough can linger beyond that time. Let your doctor know if you still have symptoms (other than a dry cough) after 2 weeks, or if youre prone to getting bronchial infections. Take steps to protect yourself from future infections. These steps include stopping smoking and avoiding tobacco smoke, washing your hands often, and getting a yearly flu shot.  When to call your healthcare provider  Call the healthcare provider if you have any of the following:  · Fever of 100.4°F (38.0°C) or higher, or as advised  · Symptoms that get worse, or new symptoms  · Trouble breathing  · Symptoms that dont start to improve within a week, or within 3 days of taking antibiotics   Date Last Reviewed: 12/1/2016  © 7323-1973 [employer]. [address] [place] [address], [place] [address]. All rights reserved. This information is not intended as a substitute for professional medical care. Always follow your healthcare professional's instructions.

## 2018-11-06 RX ORDER — VENLAFAXINE HYDROCHLORIDE 37.5 MG/1
37.5 CAPSULE, EXTENDED RELEASE ORAL DAILY
Qty: 30 CAPSULE | Refills: 6 | Status: SHIPPED | OUTPATIENT
Start: 2018-11-06 | End: 2019-05-02 | Stop reason: SDUPTHER

## 2018-11-23 ENCOUNTER — OFFICE VISIT (OUTPATIENT)
Dept: HEMATOLOGY/ONCOLOGY | Facility: CLINIC | Age: 53
End: 2018-11-23
Payer: COMMERCIAL

## 2018-11-23 VITALS
RESPIRATION RATE: 18 BRPM | OXYGEN SATURATION: 100 % | SYSTOLIC BLOOD PRESSURE: 144 MMHG | HEART RATE: 95 BPM | HEIGHT: 59 IN | BODY MASS INDEX: 33.11 KG/M2 | TEMPERATURE: 98 F | WEIGHT: 164.25 LBS | DIASTOLIC BLOOD PRESSURE: 81 MMHG

## 2018-11-23 DIAGNOSIS — Z79.811 PROPHYLACTIC USE OF ANASTROZOLE (ARIMIDEX): ICD-10-CM

## 2018-11-23 DIAGNOSIS — C50.412 PRIMARY CANCER OF UPPER OUTER QUADRANT OF LEFT BREAST: Primary | ICD-10-CM

## 2018-11-23 PROCEDURE — 3079F DIAST BP 80-89 MM HG: CPT | Mod: CPTII,S$GLB,, | Performed by: INTERNAL MEDICINE

## 2018-11-23 PROCEDURE — 3077F SYST BP >= 140 MM HG: CPT | Mod: CPTII,S$GLB,, | Performed by: INTERNAL MEDICINE

## 2018-11-23 PROCEDURE — 3008F BODY MASS INDEX DOCD: CPT | Mod: CPTII,S$GLB,, | Performed by: INTERNAL MEDICINE

## 2018-11-23 PROCEDURE — 99213 OFFICE O/P EST LOW 20 MIN: CPT | Mod: S$GLB,,, | Performed by: INTERNAL MEDICINE

## 2018-11-23 PROCEDURE — 99999 PR PBB SHADOW E&M-EST. PATIENT-LVL III: CPT | Mod: PBBFAC,,, | Performed by: INTERNAL MEDICINE

## 2018-11-23 RX ORDER — ASCORBATE CALCIUM 500 MG
1 TABLET ORAL
COMMUNITY
End: 2019-07-19 | Stop reason: SDUPTHER

## 2018-11-23 RX ORDER — AMLODIPINE BESYLATE 5 MG/1
5 TABLET ORAL
COMMUNITY
Start: 2018-07-22 | End: 2019-04-02 | Stop reason: SDUPTHER

## 2018-11-23 RX ORDER — ANASTROZOLE 1 MG/1
1 TABLET ORAL
COMMUNITY
Start: 2018-06-29 | End: 2020-11-10

## 2018-11-23 RX ORDER — ERGOCALCIFEROL (VITAMIN D2) 10 MCG
1 TABLET ORAL
COMMUNITY
End: 2019-07-19

## 2018-11-23 RX ORDER — AMOXICILLIN 500 MG
1 CAPSULE ORAL
COMMUNITY
End: 2019-07-19

## 2018-11-23 RX ORDER — VENLAFAXINE HYDROCHLORIDE 37.5 MG/1
1 CAPSULE, EXTENDED RELEASE ORAL
COMMUNITY
Start: 2018-08-01 | End: 2019-07-19 | Stop reason: SDUPTHER

## 2018-11-23 NOTE — PROGRESS NOTES
Subjective:       Patient ID: Natasha Espinal is a 52 y.o. female.    Chief Complaint: No chief complaint on file.    HPI     Mrs. Espinal returns today for follow up.  She has been on anastrazole for 8 months now.  She has tolerated it fairly.  Briefly, she is a 52-year-old  female who was referred for evaluation after undergoing bilateral nipple-sparing   mastectomies.   On the left nipple-sparing mastectomy specimen, there was a 3.4 cm tumor.  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  There was no evidence of DCIS or malignancy on the right mastectomy specimen.  A MammaPrint test was sent and suggested that she had a 97.8% chance of disease free survival at five years with hormonal therapy.       Review of Systems    Overall she feels OK.  She states that the hot flashes have improved, but she is still experiencing intermittent joint stiffness.    ECOG PS remains 1.  She denies any anxiety, depression, easy bruising, fevers, chills, night  sweats, weight loss, nausea, vomiting, diarrhea, constipation, diplopia, blurred vision, headache, chest pain, palpitations, shortness of breath, breast pain, abdominal pain, extremity pain, or difficulty ambulating.  The remainder of the ten-point ROS, including general, skin, lymph, H/N, cardiorespiratory, GI, , Neuro, Endocrine, and psychiatric is negative.       Objective:      Physical Exam        She is alert, oriented to time, place, person, pleasant, well nourished, in no acute physical distress.                                VITAL SIGNS:  Reviewed                                      HEENT:  Normal.  There are no nasal, oral, lip, gingival, auricular, lid,    or conjunctival lesions.  Mucosae are moist and pink, and there is no        thrush.  Pupils are equal, reactive to light and accommodation.              Extraocular muscle movements are intact.  Dentition is good.  There is no frontal or maxillary tenderness.                                      NECK:  Supple without JVD, adenopathy, or thyromegaly.                       LUNGS:  Clear to auscultation without wheezing, rales, or rhonchi.           CARDIOVASCULAR:  Reveals an S1, S2, no murmurs, no rubs, no gallops.         ABDOMEN:  Soft, nontender, without organomegaly.  Bowel sounds are    present.   Her SHRUTHI flap incision has healed nicely.                                                                 EXTREMITIES:  No cyanosis, clubbing, or edema.                               BREASTS:  She is status post bilateral nipples paring mastectomies.     Her incisions are well healed and no masses are palpated.                               LYMPHATIC:  There is no cervical, axillary, or supraclavicular adenopathy.   SKIN:  Warm and moist, without petechiae, rashes, induration, or ecchymoses.           NEUROLOGIC:  DTRs are 0-1+ bilaterally, symmetrical, motor function is 5/5,  and cranial nerves are  within normal limits.    Assessment:       1. Primary cancer of upper outer quadrant of left breast    2. Prophylactic use of anastrozole (Arimidex)      3.    Musculoskeletal symptoms secondary to AIs.   Plan:        I had a long discussion with her. She will remain on anastrazole through the end of March 2023 and see me again in 4 months.     Her multiple questions were answered to her satisfaction.

## 2018-12-22 DIAGNOSIS — Z79.811 USE OF AROMATASE INHIBITORS: Primary | ICD-10-CM

## 2018-12-24 RX ORDER — ANASTROZOLE 1 MG/1
TABLET ORAL
Qty: 90 TABLET | Refills: 2 | Status: SHIPPED | OUTPATIENT
Start: 2018-12-24 | End: 2019-07-19 | Stop reason: SDUPTHER

## 2019-01-21 ENCOUNTER — PATIENT MESSAGE (OUTPATIENT)
Dept: HEMATOLOGY/ONCOLOGY | Facility: CLINIC | Age: 54
End: 2019-01-21

## 2019-02-08 DIAGNOSIS — Z12.11 COLON CANCER SCREENING: ICD-10-CM

## 2019-02-28 ENCOUNTER — PATIENT MESSAGE (OUTPATIENT)
Dept: HEMATOLOGY/ONCOLOGY | Facility: CLINIC | Age: 54
End: 2019-02-28

## 2019-03-06 ENCOUNTER — OFFICE VISIT (OUTPATIENT)
Dept: HEMATOLOGY/ONCOLOGY | Facility: CLINIC | Age: 54
End: 2019-03-06
Payer: COMMERCIAL

## 2019-03-06 VITALS
HEIGHT: 59 IN | TEMPERATURE: 99 F | HEART RATE: 88 BPM | DIASTOLIC BLOOD PRESSURE: 96 MMHG | RESPIRATION RATE: 16 BRPM | BODY MASS INDEX: 33.64 KG/M2 | SYSTOLIC BLOOD PRESSURE: 133 MMHG | WEIGHT: 166.88 LBS | OXYGEN SATURATION: 95 %

## 2019-03-06 DIAGNOSIS — C50.412 PRIMARY CANCER OF UPPER OUTER QUADRANT OF LEFT BREAST: Primary | ICD-10-CM

## 2019-03-06 PROCEDURE — 3080F DIAST BP >= 90 MM HG: CPT | Mod: CPTII,S$GLB,, | Performed by: INTERNAL MEDICINE

## 2019-03-06 PROCEDURE — 99999 PR PBB SHADOW E&M-EST. PATIENT-LVL V: CPT | Mod: PBBFAC,,, | Performed by: INTERNAL MEDICINE

## 2019-03-06 PROCEDURE — 3075F PR MOST RECENT SYSTOLIC BLOOD PRESS GE 130-139MM HG: ICD-10-PCS | Mod: CPTII,S$GLB,, | Performed by: INTERNAL MEDICINE

## 2019-03-06 PROCEDURE — 3075F SYST BP GE 130 - 139MM HG: CPT | Mod: CPTII,S$GLB,, | Performed by: INTERNAL MEDICINE

## 2019-03-06 PROCEDURE — 3080F PR MOST RECENT DIASTOLIC BLOOD PRESSURE >= 90 MM HG: ICD-10-PCS | Mod: CPTII,S$GLB,, | Performed by: INTERNAL MEDICINE

## 2019-03-06 PROCEDURE — 99214 OFFICE O/P EST MOD 30 MIN: CPT | Mod: S$GLB,,, | Performed by: INTERNAL MEDICINE

## 2019-03-06 PROCEDURE — 99214 PR OFFICE/OUTPT VISIT, EST, LEVL IV, 30-39 MIN: ICD-10-PCS | Mod: S$GLB,,, | Performed by: INTERNAL MEDICINE

## 2019-03-06 PROCEDURE — 3008F BODY MASS INDEX DOCD: CPT | Mod: CPTII,S$GLB,, | Performed by: INTERNAL MEDICINE

## 2019-03-06 PROCEDURE — 3008F PR BODY MASS INDEX (BMI) DOCUMENTED: ICD-10-PCS | Mod: CPTII,S$GLB,, | Performed by: INTERNAL MEDICINE

## 2019-03-06 PROCEDURE — 99999 PR PBB SHADOW E&M-EST. PATIENT-LVL V: ICD-10-PCS | Mod: PBBFAC,,, | Performed by: INTERNAL MEDICINE

## 2019-03-06 NOTE — PROGRESS NOTES
Subjective:       Patient ID: Natasha Espinal is a 53 y.o. female.    Chief Complaint: No chief complaint on file.    HPI     Mrs. Espinal returns today requesting to be seen.  She has been on anastrazole since March 2018 and so far has tolerated it fairly.  Apparently over the last 2-3 weeks she has been experiencing pain and slight discomfort on the muscles of her left forearm, and she requested to be seen prior to her scheduled visit two weeks from now.  Briefly, she is a 53-year-old  female who was referred for evaluation after undergoing bilateral nipple-sparing mastectomies.   On the left nipple-sparing mastectomy specimen, there was a 3.4 cm tumor.  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  There was no evidence of DCIS or malignancy on the right mastectomy specimen.  A MammaPrint test was sent and suggested that she had a 97.8% chance of disease free survival at five years with hormonal therapy.       Review of Systems      Overall she feels OK.  She states that the hot flashes have improved, but she is still experiencing intermittent joint stiffness, now extending proximally on the left up to her elbow.    ECOG PS remains 1.  She denies any anxiety, depression, easy bruising, fevers, chills, night  sweats, weight loss, nausea, vomiting, diarrhea, constipation, diplopia, blurred vision, headache, chest pain, palpitations, shortness of breath, breast pain, abdominal pain, extremity pain, or difficulty ambulating.  The remainder of the ten-point ROS, including general, skin, lymph, H/N, cardiorespiratory, GI, , Neuro, Endocrine, and psychiatric is negative.       Objective:      Physical Exam        She is alert, oriented to time, place, person, pleasant, well nourished, in no acute physical distress.                                VITAL SIGNS:  Reviewed                                      HEENT:  Normal.  There are no nasal, oral, lip, gingival, auricular, lid,    or  conjunctival lesions.  Mucosae are moist and pink, and there is no        thrush.  Pupils are equal, reactive to light and accommodation.              Extraocular muscle movements are intact.  Dentition is good.  There is no frontal or maxillary tenderness.                                     NECK:  Supple without JVD, adenopathy, or thyromegaly.                       LUNGS:  Clear to auscultation without wheezing, rales, or rhonchi.           CARDIOVASCULAR:  Reveals an S1, S2, no murmurs, no rubs, no gallops.         ABDOMEN:  Soft, nontender, without organomegaly.  Bowel sounds are    present.   Her SHRUTHI flap incision has healed nicely.                                                                 EXTREMITIES:  No cyanosis, clubbing, or edema.                               BREASTS:  She is status post bilateral nipples paring mastectomies.     Her incisions are well healed and no masses are palpated.                               LYMPHATIC:  There is no cervical, axillary, or supraclavicular adenopathy.   SKIN:  Warm and moist, without petechiae, rashes, induration, or ecchymoses.           NEUROLOGIC:  DTRs are 0-1+ bilaterally, symmetrical, motor function is 5/5,  and cranial nerves are  within normal limits.    Assessment:       1. Primary cancer of upper outer quadrant of left breast      3.    Musculoskeletal symptoms secondary to AIs.   Plan:        I had a long discussion with her.  There is no swelling of her left arm, and I do not feel compelled to pursue an ultrasound.  I have instructed her to return and see me ASAP should she notice any swelling on her left arm, in which case she will have a Doppler study.  She voiced understanding.  Assuming there are no changes in her condition, I will see her again in 4 months, and we can cancel her follow up two weeks from now.  She will remain on anastrazole through the end of March 2023 and see me again in 4 months.     Her multiple questions were answered to  her satisfaction.

## 2019-03-08 RX ORDER — TRAZODONE HYDROCHLORIDE 50 MG/1
50 TABLET ORAL NIGHTLY
Qty: 30 TABLET | Refills: 3 | Status: SHIPPED | OUTPATIENT
Start: 2019-03-08 | End: 2019-07-19 | Stop reason: SDUPTHER

## 2019-04-02 DIAGNOSIS — I10 ESSENTIAL HYPERTENSION: ICD-10-CM

## 2019-04-02 RX ORDER — LISINOPRIL AND HYDROCHLOROTHIAZIDE 12.5; 2 MG/1; MG/1
1 TABLET ORAL EVERY MORNING
Qty: 30 TABLET | Refills: 2 | Status: SHIPPED | OUTPATIENT
Start: 2019-04-02 | End: 2019-06-24 | Stop reason: SDUPTHER

## 2019-04-02 RX ORDER — AMLODIPINE BESYLATE 5 MG/1
TABLET ORAL
Qty: 30 TABLET | Refills: 2 | Status: SHIPPED | OUTPATIENT
Start: 2019-04-02 | End: 2019-06-24 | Stop reason: SDUPTHER

## 2019-04-16 NOTE — Clinical Note
I called the patient to let her kbnow her LOW risk Mammaprint result.  Where do we stand on scheduling her for B NSM with Left SLNB with reconstruction.  I know she was setting up an appt at [facility] but we also gave her other options from a plastics perspective. Thanks, RLKOLBY
Will likely be scheduling a B NSM with left axillary SLNB. Pending plastics evaluation.
No

## 2019-05-02 RX ORDER — VENLAFAXINE HYDROCHLORIDE 37.5 MG/1
37.5 CAPSULE, EXTENDED RELEASE ORAL DAILY
Qty: 30 CAPSULE | Refills: 5 | Status: SHIPPED | OUTPATIENT
Start: 2019-05-02 | End: 2019-07-19 | Stop reason: SDUPTHER

## 2019-06-15 ENCOUNTER — LAB VISIT (OUTPATIENT)
Dept: LAB | Facility: HOSPITAL | Age: 54
End: 2019-06-15
Attending: INTERNAL MEDICINE
Payer: COMMERCIAL

## 2019-06-15 DIAGNOSIS — Z12.11 COLON CANCER SCREENING: ICD-10-CM

## 2019-06-15 PROCEDURE — 82274 ASSAY TEST FOR BLOOD FECAL: CPT

## 2019-06-18 LAB — HEMOCCULT STL QL IA: NEGATIVE

## 2019-06-24 ENCOUNTER — TELEPHONE (OUTPATIENT)
Dept: INTERNAL MEDICINE | Facility: CLINIC | Age: 54
End: 2019-06-24

## 2019-06-24 DIAGNOSIS — I10 ESSENTIAL HYPERTENSION: ICD-10-CM

## 2019-06-24 RX ORDER — LISINOPRIL AND HYDROCHLOROTHIAZIDE 12.5; 2 MG/1; MG/1
TABLET ORAL
Qty: 30 TABLET | Refills: 2 | Status: SHIPPED | OUTPATIENT
Start: 2019-06-24 | End: 2019-07-19 | Stop reason: ALTCHOICE

## 2019-06-24 RX ORDER — AMLODIPINE BESYLATE 5 MG/1
TABLET ORAL
Qty: 30 TABLET | Refills: 2 | Status: SHIPPED | OUTPATIENT
Start: 2019-06-24 | End: 2019-08-28 | Stop reason: SDUPTHER

## 2019-07-05 NOTE — PROGRESS NOTES
Subjective:       Patient ID: Natsaha Espinal is a 53 y.o. female.    Chief Complaint: Follow-up    HPI     Mrs. Espinal returns today for a follow up.   She has been on anastrazole since March 2018.    Briefly, she is a 53-year-old  female who was referred for evaluation after undergoing bilateral nipple-sparing mastectomies.   On the left nipple-sparing mastectomy specimen, there was a 3.4 cm tumor.  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  There was no evidence of DCIS or malignancy on the right mastectomy specimen.  A MammaPrint test was sent and suggested that she had a 97.8% chance of disease free survival at five years with hormonal therapy.       Review of Systems      Overall she feels OK.  She continues to have joint pain in hands and elbows. Certain activities cause it to be worse.  Hot flashes resolved. Some itching noted at incisions. No rashes.   ECOG PS remains 1.  She denies any anxiety, depression, easy bruising, fevers, chills, night  sweats, weight loss, nausea, vomiting, diarrhea, constipation, diplopia, blurred vision, headache, chest pain, palpitations, shortness of breath, breast pain, abdominal pain, extremity pain, or difficulty ambulating.  The remainder of the ten-point ROS, including general, skin, lymph, H/N, cardiorespiratory, GI, , Neuro, Endocrine, and psychiatric is negative.       Objective:      Physical Exam        She is alert, oriented to time, place, person, pleasant, well nourished, in no acute physical distress.  Presents alone.                        VITAL SIGNS:  Reviewed                                      HEENT:  Normal.  There are no nasal, oral, lip, gingival, auricular, lid,    or conjunctival lesions.  Mucosae are moist and pink, and there is no        thrush.  Pupils are equal, reactive to light and accommodation.              Extraocular muscle movements are intact.  Dentition is good.  There is no frontal or maxillary  tenderness.                                     NECK:  Supple without JVD, adenopathy, or thyromegaly.                       LUNGS:  Clear to auscultation without wheezing, rales, or rhonchi.           CARDIOVASCULAR:  Reveals an S1, S2, no murmurs, no rubs, no gallops.         ABDOMEN:  Soft, nontender, without organomegaly.  Bowel sounds are    present.   Her SHRUTHI flap incision has healed nicely.                                                                 EXTREMITIES:  No cyanosis, clubbing, or edema.                               BREASTS:  She is status post bilateral nipples paring mastectomies.     Her incisions are well healed and no masses are palpated.                               LYMPHATIC:  There is no cervical, axillary, or supraclavicular adenopathy.   SKIN:  Warm and moist, without petechiae, rashes, induration, or ecchymoses.           NEUROLOGIC:  DTRs are 0-1+ bilaterally, symmetrical, motor function is 5/5,  and cranial nerves are  within normal limits.    Assessment:       1. Primary cancer of upper outer quadrant of left breast    2. Prophylactic use of anastrozole (Arimidex)    3. Aromatase inhibitor-associated arthralgia        Plan:           Doing well, MADHU clinically.   She will remain on anastrazole through the end of March 2023.   RTC in 4 months to see Dr. Boswell.    BMD due in 11/2019.   Continue to follow up with PCP for other health maintenance. Appt in 2 weeks Reminded that she needs Vit D yearly.       Advance Care Planning     Power of   I initiated the process of advance care planning today and explained the importance of this process to the patient.  I introduced the concept of advance directives to the patient, as well. Then the patient received detailed information about the importance of designating a Health Care Power of  (HCPOA). She was also instructed to communicate with this person about their wishes for future healthcare, should she become sick and lose  decision-making capacity. The patient has not previously appointed a HCPOA. After our discussion, the patient has decided to complete a HCPOA and will return the completed form. I spent a total time of 15 minutes discussing this issue with the patient.     Advance Care Planning     Living Will  During this visit, I engaged the patient in the advance care planning process.  The patient and I reviewed the role for advance directives and their purpose in directing future healthcare if the patient's unable to speak for him/herself.  At this point in time, the patient does have full decision-making capacity.  We discussed different extreme health states that she could experience, and reviewed what kind of medical care she would want in those situations.    The patient has not completed a living will to reflect these preferences.    She will review the packet and return the completed necessary forms.   I spent a total of 15 minutes engaging the patient in this advance care planning discussion.                  Patient is in agreement with the proposed treatment plan. All questions were answered to the patient's satisfaction. Pt knows to call clinic for any new or worsening symptoms and if anything is needed before the next clinic visit.      NICKI Alberto-KOLBY  Hematology & Oncology  [address] [place] [id number]  ph. [phone]  Fax. [phone]     I spent 30 minutes (face to face) with the patient, more than 50% was in counseling and coordination of care as detailed above.           Distress Screening Results: Psychosocial Distress screening score of Distress Score: 2 noted and reviewed. No intervention indicated.

## 2019-07-08 ENCOUNTER — TELEPHONE (OUTPATIENT)
Dept: OBSTETRICS AND GYNECOLOGY | Facility: CLINIC | Age: 54
End: 2019-07-08

## 2019-07-08 ENCOUNTER — OFFICE VISIT (OUTPATIENT)
Dept: HEMATOLOGY/ONCOLOGY | Facility: CLINIC | Age: 54
End: 2019-07-08
Payer: COMMERCIAL

## 2019-07-08 VITALS
OXYGEN SATURATION: 95 % | WEIGHT: 167.31 LBS | DIASTOLIC BLOOD PRESSURE: 89 MMHG | HEART RATE: 85 BPM | HEIGHT: 59 IN | RESPIRATION RATE: 16 BRPM | TEMPERATURE: 98 F | BODY MASS INDEX: 33.73 KG/M2 | SYSTOLIC BLOOD PRESSURE: 155 MMHG

## 2019-07-08 DIAGNOSIS — T45.1X5A AROMATASE INHIBITOR-ASSOCIATED ARTHRALGIA: ICD-10-CM

## 2019-07-08 DIAGNOSIS — M25.50 AROMATASE INHIBITOR-ASSOCIATED ARTHRALGIA: ICD-10-CM

## 2019-07-08 DIAGNOSIS — Z79.811 PROPHYLACTIC USE OF ANASTROZOLE (ARIMIDEX): ICD-10-CM

## 2019-07-08 DIAGNOSIS — C50.412 PRIMARY CANCER OF UPPER OUTER QUADRANT OF LEFT BREAST: Primary | ICD-10-CM

## 2019-07-08 PROCEDURE — 3077F SYST BP >= 140 MM HG: CPT | Mod: CPTII,S$GLB,, | Performed by: NURSE PRACTITIONER

## 2019-07-08 PROCEDURE — 99214 PR OFFICE/OUTPT VISIT, EST, LEVL IV, 30-39 MIN: ICD-10-PCS | Mod: S$GLB,,, | Performed by: NURSE PRACTITIONER

## 2019-07-08 PROCEDURE — 99999 PR PBB SHADOW E&M-EST. PATIENT-LVL III: CPT | Mod: PBBFAC,,, | Performed by: NURSE PRACTITIONER

## 2019-07-08 PROCEDURE — 3079F PR MOST RECENT DIASTOLIC BLOOD PRESSURE 80-89 MM HG: ICD-10-PCS | Mod: CPTII,S$GLB,, | Performed by: NURSE PRACTITIONER

## 2019-07-08 PROCEDURE — 3008F BODY MASS INDEX DOCD: CPT | Mod: CPTII,S$GLB,, | Performed by: NURSE PRACTITIONER

## 2019-07-08 PROCEDURE — 3079F DIAST BP 80-89 MM HG: CPT | Mod: CPTII,S$GLB,, | Performed by: NURSE PRACTITIONER

## 2019-07-08 PROCEDURE — 3008F PR BODY MASS INDEX (BMI) DOCUMENTED: ICD-10-PCS | Mod: CPTII,S$GLB,, | Performed by: NURSE PRACTITIONER

## 2019-07-08 PROCEDURE — 3077F PR MOST RECENT SYSTOLIC BLOOD PRESSURE >= 140 MM HG: ICD-10-PCS | Mod: CPTII,S$GLB,, | Performed by: NURSE PRACTITIONER

## 2019-07-08 PROCEDURE — 99214 OFFICE O/P EST MOD 30 MIN: CPT | Mod: S$GLB,,, | Performed by: NURSE PRACTITIONER

## 2019-07-08 PROCEDURE — 99999 PR PBB SHADOW E&M-EST. PATIENT-LVL III: ICD-10-PCS | Mod: PBBFAC,,, | Performed by: NURSE PRACTITIONER

## 2019-07-08 NOTE — TELEPHONE ENCOUNTER
Patient requests refill of Trazodone 50mg PO PRN for sleep. Patient's last visit was 05/03/2018. Patient overdue for WWE/Pap Smear. RN arranged an appt for pt 07/19/2019 at 10:30am w/ Dr. Phipps for her annual and pap smear. Refill request authorized via [facility] fax in [place], [place].

## 2019-07-19 ENCOUNTER — OFFICE VISIT (OUTPATIENT)
Dept: INTERNAL MEDICINE | Facility: CLINIC | Age: 54
End: 2019-07-19
Payer: COMMERCIAL

## 2019-07-19 ENCOUNTER — OFFICE VISIT (OUTPATIENT)
Dept: OBSTETRICS AND GYNECOLOGY | Facility: CLINIC | Age: 54
End: 2019-07-19
Attending: OBSTETRICS & GYNECOLOGY
Payer: COMMERCIAL

## 2019-07-19 VITALS
OXYGEN SATURATION: 95 % | HEART RATE: 99 BPM | WEIGHT: 167.13 LBS | BODY MASS INDEX: 33.69 KG/M2 | HEIGHT: 59 IN | DIASTOLIC BLOOD PRESSURE: 76 MMHG | SYSTOLIC BLOOD PRESSURE: 110 MMHG

## 2019-07-19 VITALS
SYSTOLIC BLOOD PRESSURE: 130 MMHG | BODY MASS INDEX: 33.56 KG/M2 | DIASTOLIC BLOOD PRESSURE: 91 MMHG | WEIGHT: 166.44 LBS | HEIGHT: 59 IN

## 2019-07-19 DIAGNOSIS — Z85.3 HISTORY OF BREAST CANCER: ICD-10-CM

## 2019-07-19 DIAGNOSIS — Z85.3 HISTORY OF BREAST CANCER IN FEMALE: ICD-10-CM

## 2019-07-19 DIAGNOSIS — N95.2 VAGINAL ATROPHY: ICD-10-CM

## 2019-07-19 DIAGNOSIS — I10 ESSENTIAL HYPERTENSION: Primary | ICD-10-CM

## 2019-07-19 DIAGNOSIS — Z01.419 ENCOUNTER FOR GYNECOLOGICAL EXAMINATION WITHOUT ABNORMAL FINDING: ICD-10-CM

## 2019-07-19 DIAGNOSIS — E55.9 VITAMIN D DEFICIENCY: ICD-10-CM

## 2019-07-19 DIAGNOSIS — Z12.4 SCREENING FOR MALIGNANT NEOPLASM OF CERVIX: Primary | ICD-10-CM

## 2019-07-19 DIAGNOSIS — F41.9 ANXIETY: ICD-10-CM

## 2019-07-19 DIAGNOSIS — N94.19 DYSPAREUNIA DUE TO MEDICAL CONDITION IN FEMALE: ICD-10-CM

## 2019-07-19 DIAGNOSIS — Z00.00 PREVENTATIVE HEALTH CARE: ICD-10-CM

## 2019-07-19 PROCEDURE — 3075F PR MOST RECENT SYSTOLIC BLOOD PRESS GE 130-139MM HG: ICD-10-PCS | Mod: CPTII,S$GLB,, | Performed by: OBSTETRICS & GYNECOLOGY

## 2019-07-19 PROCEDURE — 3078F DIAST BP <80 MM HG: CPT | Mod: CPTII,S$GLB,, | Performed by: INTERNAL MEDICINE

## 2019-07-19 PROCEDURE — 99999 PR PBB SHADOW E&M-EST. PATIENT-LVL III: CPT | Mod: PBBFAC,,, | Performed by: OBSTETRICS & GYNECOLOGY

## 2019-07-19 PROCEDURE — 99214 PR OFFICE/OUTPT VISIT, EST, LEVL IV, 30-39 MIN: ICD-10-PCS | Mod: S$GLB,,, | Performed by: INTERNAL MEDICINE

## 2019-07-19 PROCEDURE — 99396 PR PREVENTIVE VISIT,EST,40-64: ICD-10-PCS | Mod: S$GLB,,, | Performed by: OBSTETRICS & GYNECOLOGY

## 2019-07-19 PROCEDURE — 3074F PR MOST RECENT SYSTOLIC BLOOD PRESSURE < 130 MM HG: ICD-10-PCS | Mod: CPTII,S$GLB,, | Performed by: INTERNAL MEDICINE

## 2019-07-19 PROCEDURE — 99214 OFFICE O/P EST MOD 30 MIN: CPT | Mod: S$GLB,,, | Performed by: INTERNAL MEDICINE

## 2019-07-19 PROCEDURE — 99396 PREV VISIT EST AGE 40-64: CPT | Mod: S$GLB,,, | Performed by: OBSTETRICS & GYNECOLOGY

## 2019-07-19 PROCEDURE — 3075F SYST BP GE 130 - 139MM HG: CPT | Mod: CPTII,S$GLB,, | Performed by: OBSTETRICS & GYNECOLOGY

## 2019-07-19 PROCEDURE — 99999 PR PBB SHADOW E&M-EST. PATIENT-LVL III: CPT | Mod: PBBFAC,,, | Performed by: INTERNAL MEDICINE

## 2019-07-19 PROCEDURE — 3008F BODY MASS INDEX DOCD: CPT | Mod: CPTII,S$GLB,, | Performed by: INTERNAL MEDICINE

## 2019-07-19 PROCEDURE — 3080F DIAST BP >= 90 MM HG: CPT | Mod: CPTII,S$GLB,, | Performed by: OBSTETRICS & GYNECOLOGY

## 2019-07-19 PROCEDURE — 99999 PR PBB SHADOW E&M-EST. PATIENT-LVL III: ICD-10-PCS | Mod: PBBFAC,,, | Performed by: OBSTETRICS & GYNECOLOGY

## 2019-07-19 PROCEDURE — 88175 CYTOPATH C/V AUTO FLUID REDO: CPT

## 2019-07-19 PROCEDURE — 3074F SYST BP LT 130 MM HG: CPT | Mod: CPTII,S$GLB,, | Performed by: INTERNAL MEDICINE

## 2019-07-19 PROCEDURE — 3008F PR BODY MASS INDEX (BMI) DOCUMENTED: ICD-10-PCS | Mod: CPTII,S$GLB,, | Performed by: INTERNAL MEDICINE

## 2019-07-19 PROCEDURE — 87624 HPV HI-RISK TYP POOLED RSLT: CPT

## 2019-07-19 PROCEDURE — 3080F PR MOST RECENT DIASTOLIC BLOOD PRESSURE >= 90 MM HG: ICD-10-PCS | Mod: CPTII,S$GLB,, | Performed by: OBSTETRICS & GYNECOLOGY

## 2019-07-19 PROCEDURE — 99999 PR PBB SHADOW E&M-EST. PATIENT-LVL III: ICD-10-PCS | Mod: PBBFAC,,, | Performed by: INTERNAL MEDICINE

## 2019-07-19 PROCEDURE — 3078F PR MOST RECENT DIASTOLIC BLOOD PRESSURE < 80 MM HG: ICD-10-PCS | Mod: CPTII,S$GLB,, | Performed by: INTERNAL MEDICINE

## 2019-07-19 RX ORDER — VENLAFAXINE HYDROCHLORIDE 37.5 MG/1
37.5 CAPSULE, EXTENDED RELEASE ORAL DAILY
Qty: 30 CAPSULE | Refills: 11 | Status: SHIPPED | OUTPATIENT
Start: 2019-07-19 | End: 2019-08-28 | Stop reason: SDUPTHER

## 2019-07-19 RX ORDER — LOSARTAN POTASSIUM AND HYDROCHLOROTHIAZIDE 12.5; 5 MG/1; MG/1
1 TABLET ORAL DAILY
Qty: 30 TABLET | Refills: 1 | Status: SHIPPED | OUTPATIENT
Start: 2019-07-19 | End: 2019-08-12 | Stop reason: SDUPTHER

## 2019-07-19 RX ORDER — TRAZODONE HYDROCHLORIDE 50 MG/1
50 TABLET ORAL NIGHTLY
Qty: 30 TABLET | Refills: 11 | Status: SHIPPED | OUTPATIENT
Start: 2019-07-19 | End: 2020-08-11 | Stop reason: SDUPTHER

## 2019-07-19 RX ORDER — ACETAMINOPHEN 500 MG
TABLET ORAL
COMMUNITY

## 2019-07-19 NOTE — PROGRESS NOTES
SUBJECTIVE:   53 y.o. female   for annual routine Pap and checkup. No LMP recorded. Patient has had a hysterectomy.- supracervical.    She had bilateral nipple sparing mastectomy with negative margins with the SHRUTHI flap reconstruction 2018.  ER positive CA positive her 2-.  She started anastrozole in 2018 and will complete this in 2023  She reports vaginal dryness and pain with intercourse. She has tried Sylk with no improvement. She reports not being able to have sex because of pain. She also reports joint pain which is worse at night and worse with vibration-she reports she takes Tylenol prior to biking driving and cutting the grass.  She is doing water aerobics annual the which helps with the joint pain.        Past Medical History:   Diagnosis Date    Breast cancer 2017    left     Hypertension      Past Surgical History:   Procedure Laterality Date    BREAST BIOPSY Left 2017    BREAST BIOPSY Left     exc bx    BREAST RECONSTRUCTION Bilateral     2018-2018 shruthi flap    deviated septum repair      HYSTERECTOMY  2011    MASTECTOMY Bilateral 2018    OOPHORECTOMY      scar tissue removal in airway  Bilateral 2018     Social History     Socioeconomic History    Marital status:      Spouse name: Not on file    Number of children: Not on file    Years of education: Not on file    Highest education level: Not on file   Occupational History    Not on file   Social Needs    Financial resource strain: Not on file    Food insecurity:     Worry: Not on file     Inability: Not on file    Transportation needs:     Medical: Not on file     Non-medical: Not on file   Tobacco Use    Smoking status: Never Smoker    Smokeless tobacco: Never Used   Substance and Sexual Activity    Alcohol use: No    Drug use: No    Sexual activity: Yes     Partners: Male     Birth control/protection: See Surgical Hx   Lifestyle    Physical activity:     Days  per week: Not on file     Minutes per session: Not on file    Stress: Not on file   Relationships    Social connections:     Talks on phone: Not on file     Gets together: Not on file     Attends Tenriism service: Not on file     Active member of club or organization: Not on file     Attends meetings of clubs or organizations: Not on file     Relationship status: Not on file   Other Topics Concern    Not on file   Social History Narrative    Not on file     Family History   Problem Relation Age of Onset    Heart disease Father     Breast cancer Paternal Aunt 43    Breast cancer Paternal Aunt 62    Breast cancer Paternal Aunt 55    Colon cancer Paternal Uncle     Ovarian cancer Neg Hx     Cancer Neg Hx      OB History    Para Term  AB Living   2 2 2         SAB TAB Ectopic Multiple Live Births                  # Outcome Date GA Lbr Vijay/2nd Weight Sex Delivery Anes PTL Lv   2 Term            1 Term                    Current Outpatient Medications   Medication Sig Dispense Refill    amLODIPine (NORVASC) 5 MG tablet TAKE 1 TABLET BY MOUTH EVERY DAY IN THE EVENING 30 tablet 2    anastrozole (ARIMIDEX) 1 mg Tab Take 1 mg by mouth.      ascorbate calcium 500 mg Tab Take 1 tablet by mouth.      cetirizine (ZYRTEC) 10 MG tablet Take 10 mg by mouth once daily.      ergocalciferol, vitamin D2, 400 unit Tab Take by mouth.      fish oil-omega-3 fatty acids 300-1,000 mg capsule Take 1 g by mouth.      lisinopril-hydrochlorothiazide (PRINZIDE,ZESTORETIC) 20-12.5 mg per tablet TAKE 1 TABLET BY MOUTH EVERY DAY IN THE MORNING 30 tablet 2    melatonin 5 mg Tab Take by mouth.      MULTIVIT-IRON-MIN-FOLIC ACID 3,500-18-0.4 UNIT-MG-MG ORAL CHEW Take by mouth once daily.      traZODone (DESYREL) 50 MG tablet Take 1 tablet (50 mg total) by mouth nightly. 30 tablet 11    venlafaxine (EFFEXOR-XR) 37.5 MG 24 hr capsule Take 1 capsule (37.5 mg total) by mouth once daily. 30 capsule 11    vitamin D 1000  units Tab Take 1,000 Units by mouth once daily.      prasterone, dhea, (INTRAROSA) 6.5 mg Inst Place 6.5 mg vaginally every evening. 30 each 11     No current facility-administered medications for this visit.      Allergies: Sulfa (sulfonamide antibiotics)     The 10-year ASCVD risk score (Alon ZIEGLER Jr., et al., 2013) is: 3.5%    Values used to calculate the score:      Age: 53 years      Sex: Female      Is Non- : No      Diabetic: No      Tobacco smoker: No      Systolic Blood Pressure: 130 mmHg      Is BP treated: Yes      HDL Cholesterol: 43 mg/dL      Total Cholesterol: 215 mg/dL      ROS:  Constitutional: no weight loss, weight gain, fever, fatigue  Eyes:  No vision changes, glasses/contacts  ENT/Mouth: No ulcers, sinus problems, ears ringing, headache  Cardiovascular: No inability to lie flat, chest pain, exercise intolerance, swelling, heart palpitations  Respiratory: No wheezing, coughing blood, shortness of breath, or cough  Gastrointestinal: No diarrhea, bloody stool, nausea/vomiting, constipation, gas, hemorrhoids  Genitourinary: No blood in urine, painful urination, urgency of urination, frequency of urination, incomplete emptying, incontinence, abnormal bleeding, painful periods, heavy periods, vaginal discharge, vaginal odor, +painful intercourse, sexual problems, bleeding after intercourse, +vaginal dryness.  Musculoskeletal: No muscle weakness  Skin/Breast: breast cancer  Neurological: No passing out, seizures, numbness, headache  Endocrine: No diabetes, hypothyroid, hyperthyroid, +hot flashes, hair loss, abnormal hair growth, acne  Psychiatric: No depression, crying  Hematologic: No bruises, bleeding, swollen lymph nodes, anemia.      Physical Exam:   Constitutional: She is oriented to person, place, and time. She appears well-developed and well-nourished.      Neck: Normal range of motion. No tracheal deviation present. No thyromegaly present.    Cardiovascular: Exam reveals  no edema.     Pulmonary/Chest: Effort normal. She exhibits no mass, no tenderness, no deformity and no retraction. Right breast exhibits no inverted nipple, no mass, no nipple discharge, no skin change, no tenderness, presence, no bleeding and no swelling. Left breast exhibits no inverted nipple, no mass, no nipple discharge, no skin change, no tenderness, presence, no bleeding and no swelling. Breasts are symmetrical.        Abdominal: Soft. She exhibits no distension and no mass. There is no tenderness. There is no rebound and no guarding. No hernia. Hernia confirmed negative in the left inguinal area.     Genitourinary: Vagina normal. Rectal exam shows no external hemorrhoid. There is no rash, tenderness or lesion on the right labia. There is no rash, tenderness or lesion on the left labia. Uterus is absent. Cervix is normal. No no adexnal prolapse. Right adnexum displays no mass, no tenderness and no fullness. Left adnexum displays no mass, no tenderness and no fullness. No tenderness, bleeding, rectocele, cystocele or unspecified prolapse of vaginal walls in the vagina. No vaginal discharge found. Cervix exhibits no motion tenderness, no discharge and no friability.           Musculoskeletal: Normal range of motion and moves all extremeties. She exhibits no edema.      Lymphadenopathy:        Right: No inguinal adenopathy present.        Left: No inguinal adenopathy present.    Neurological: She is alert and oriented to person, place, and time.    Skin: No rash noted. No erythema. No pallor.    Psychiatric: She has a normal mood and affect. Her behavior is normal. Judgment and thought content normal.     +vaginal atrophy    ASSESSMENT:   well woman  History of breast cancer  Use of aromatase inhibitor  Vaginal atrophy  Dyspareunia  PLAN:   pap smear  Counseled her on risks benefits and side effects of Intrarosa.   Counseled her to continue silk  Counseled her to use lubrication moisturizer nightly  Will order  pelvic floor physical therapy  return annually or prn

## 2019-07-19 NOTE — PROGRESS NOTES
Pt. ID: Natasha Espinal is a 53 y.o. female      Chief complaint:   Chief Complaint   Patient presents with    Annual Exam       HPI: pt. Here for f/u for HTN; pt. Friend, Ilda, is with the pt.; she is compliant with meds and BP is WNL; pt. Would like to change lisinopril HCTZ due to cancer risks; IFOBT dated 6/18/19 was negative; of note, she had double mastectomy for breast cancer and sees oncology; she is on vitamin D and would like level checked; effexor helps anxiety       Review of Systems   Constitutional: Negative for chills and fever.   Respiratory: Negative for cough and shortness of breath.    Cardiovascular: Negative for chest pain.   Gastrointestinal: Negative for abdominal pain, nausea and vomiting.   Genitourinary: Negative for dysuria.   Psychiatric/Behavioral: The patient is nervous/anxious.         Effexor helps anxiety          Objective:    Physical Exam   Constitutional: She is oriented to person, place, and time.   obese   Eyes: EOM are normal.   Neck: Normal range of motion.   Cardiovascular: Normal rate, regular rhythm and normal heart sounds.   Pulmonary/Chest: Effort normal and breath sounds normal. No respiratory distress. She has no wheezes. She has no rales.   Abdominal: Soft. There is no tenderness. There is no rebound and no guarding.   Musculoskeletal: Normal range of motion.   Neurological: She is alert and oriented to person, place, and time.   Skin: No rash noted.   Vitals reviewed.        Health Maintenance   Topic Date Due    Pneumococcal Vaccine (Highest Risk) (1 of 3 - PCV13) 12/02/1983    Influenza Vaccine  08/01/2019    Fecal Occult Blood Test (FOBT)/[employer]  06/15/2020    Lipid Panel  09/12/2021    TETANUS VACCINE  07/31/2027    Hepatitis C Screening  Addressed         Assessment:     1. Essential hypertension Well controlled   2. Vitamin D deficiency Active   3. Anxiety Well controlled   4. History of breast cancer Active   5. Preventative health care Active          Plan: Essential hypertension  Comments:  change lisinopril HCTZ to losartan HCTZ and encouraged low Na diet and weight loss   Orders:  -     losartan-hydrochlorothiazide 50-12.5 mg (HYZAAR) 50-12.5 mg per tablet; Take 1 tablet by mouth once daily.  Dispense: 30 tablet; Refill: 1  -     CBC auto differential; Future; Expected date: 08/09/2019  -     Comprehensive metabolic panel; Future; Expected date: 08/09/2019  -     Urinalysis; Future; Expected date: 08/09/2019    Vitamin D deficiency  Comments:  continue vitamin D and get level   Orders:  -     Vitamin D; Future; Expected date: 08/09/2019    Anxiety  Comments:  continue current regimen     History of breast cancer  Comments:  continue current regimen and f/u oncology who is managing     Preventative health care  -     CBC auto differential; Future; Expected date: 08/09/2019  -     Comprehensive metabolic panel; Future; Expected date: 08/09/2019  -     Hemoglobin A1c; Future; Expected date: 08/09/2019  -     Lipid panel; Future; Expected date: 08/09/2019  -     Urinalysis; Future; Expected date: 08/09/2019  -     Vitamin D; Future; Expected date: 08/09/2019        Problem List Items Addressed This Visit        Psychiatric    Anxiety       Cardiac/Vascular    Essential hypertension - Primary    Relevant Medications    losartan-hydrochlorothiazide 50-12.5 mg (HYZAAR) 50-12.5 mg per tablet    Other Relevant Orders    CBC auto differential    Comprehensive metabolic panel    Urinalysis       Oncology    History of breast cancer       Endocrine    Vitamin D deficiency    Relevant Orders    Vitamin D       Other    Preventative health care    Relevant Orders    CBC auto differential    Comprehensive metabolic panel    Hemoglobin A1c    Lipid panel    Urinalysis    Vitamin D

## 2019-07-25 LAB
HPV HR 12 DNA CVX QL NAA+PROBE: NEGATIVE
HPV16 AG SPEC QL: NEGATIVE
HPV18 DNA SPEC QL NAA+PROBE: NEGATIVE

## 2019-08-12 DIAGNOSIS — I10 ESSENTIAL HYPERTENSION: ICD-10-CM

## 2019-08-12 RX ORDER — LOSARTAN POTASSIUM AND HYDROCHLOROTHIAZIDE 12.5; 5 MG/1; MG/1
TABLET ORAL
Qty: 30 TABLET | Refills: 0 | Status: SHIPPED | OUTPATIENT
Start: 2019-08-12 | End: 2019-08-28 | Stop reason: SDUPTHER

## 2019-08-22 ENCOUNTER — LAB VISIT (OUTPATIENT)
Dept: LAB | Facility: HOSPITAL | Age: 54
End: 2019-08-22
Attending: INTERNAL MEDICINE
Payer: COMMERCIAL

## 2019-08-22 DIAGNOSIS — Z00.00 PREVENTATIVE HEALTH CARE: ICD-10-CM

## 2019-08-22 DIAGNOSIS — E55.9 VITAMIN D DEFICIENCY: ICD-10-CM

## 2019-08-22 DIAGNOSIS — I10 ESSENTIAL HYPERTENSION: ICD-10-CM

## 2019-08-22 LAB
25(OH)D3+25(OH)D2 SERPL-MCNC: 55 NG/ML (ref 30–96)
ALBUMIN SERPL BCP-MCNC: 4.1 G/DL (ref 3.5–5.2)
ALP SERPL-CCNC: 95 U/L (ref 55–135)
ALT SERPL W/O P-5'-P-CCNC: 29 U/L (ref 10–44)
ANION GAP SERPL CALC-SCNC: 10 MMOL/L (ref 8–16)
AST SERPL-CCNC: 18 U/L (ref 10–40)
BASOPHILS # BLD AUTO: 0.06 K/UL (ref 0–0.2)
BASOPHILS NFR BLD: 0.8 % (ref 0–1.9)
BILIRUB SERPL-MCNC: 0.5 MG/DL (ref 0.1–1)
BUN SERPL-MCNC: 18 MG/DL (ref 6–20)
CALCIUM SERPL-MCNC: 10.3 MG/DL (ref 8.7–10.5)
CHLORIDE SERPL-SCNC: 101 MMOL/L (ref 95–110)
CHOLEST SERPL-MCNC: 215 MG/DL (ref 120–199)
CHOLEST/HDLC SERPL: 5 {RATIO} (ref 2–5)
CO2 SERPL-SCNC: 30 MMOL/L (ref 23–29)
CREAT SERPL-MCNC: 0.8 MG/DL (ref 0.5–1.4)
DIFFERENTIAL METHOD: ABNORMAL
EOSINOPHIL # BLD AUTO: 0.5 K/UL (ref 0–0.5)
EOSINOPHIL NFR BLD: 6.4 % (ref 0–8)
ERYTHROCYTE [DISTWIDTH] IN BLOOD BY AUTOMATED COUNT: 13.3 % (ref 11.5–14.5)
EST. GFR  (AFRICAN AMERICAN): >60 ML/MIN/1.73 M^2
EST. GFR  (NON AFRICAN AMERICAN): >60 ML/MIN/1.73 M^2
ESTIMATED AVG GLUCOSE: 108 MG/DL (ref 68–131)
GLUCOSE SERPL-MCNC: 95 MG/DL (ref 70–110)
HBA1C MFR BLD HPLC: 5.4 % (ref 4–5.6)
HCT VFR BLD AUTO: 40.2 % (ref 37–48.5)
HDLC SERPL-MCNC: 43 MG/DL (ref 40–75)
HDLC SERPL: 20 % (ref 20–50)
HGB BLD-MCNC: 13.1 G/DL (ref 12–16)
IMM GRANULOCYTES # BLD AUTO: 0.06 K/UL (ref 0–0.04)
IMM GRANULOCYTES NFR BLD AUTO: 0.8 % (ref 0–0.5)
LDLC SERPL CALC-MCNC: 124.4 MG/DL (ref 63–159)
LYMPHOCYTES # BLD AUTO: 2.5 K/UL (ref 1–4.8)
LYMPHOCYTES NFR BLD: 34.4 % (ref 18–48)
MCH RBC QN AUTO: 28.3 PG (ref 27–31)
MCHC RBC AUTO-ENTMCNC: 32.6 G/DL (ref 32–36)
MCV RBC AUTO: 87 FL (ref 82–98)
MONOCYTES # BLD AUTO: 0.4 K/UL (ref 0.3–1)
MONOCYTES NFR BLD: 5.8 % (ref 4–15)
NEUTROPHILS # BLD AUTO: 3.7 K/UL (ref 1.8–7.7)
NEUTROPHILS NFR BLD: 51.8 % (ref 38–73)
NONHDLC SERPL-MCNC: 172 MG/DL
NRBC BLD-RTO: 0 /100 WBC
PLATELET # BLD AUTO: 280 K/UL (ref 150–350)
PMV BLD AUTO: 9.9 FL (ref 9.2–12.9)
POTASSIUM SERPL-SCNC: 3.6 MMOL/L (ref 3.5–5.1)
PROT SERPL-MCNC: 7.7 G/DL (ref 6–8.4)
RBC # BLD AUTO: 4.63 M/UL (ref 4–5.4)
SODIUM SERPL-SCNC: 141 MMOL/L (ref 136–145)
TRIGL SERPL-MCNC: 238 MG/DL (ref 30–150)
WBC # BLD AUTO: 7.2 K/UL (ref 3.9–12.7)

## 2019-08-22 PROCEDURE — 80061 LIPID PANEL: CPT

## 2019-08-22 PROCEDURE — 82306 VITAMIN D 25 HYDROXY: CPT

## 2019-08-22 PROCEDURE — 36415 COLL VENOUS BLD VENIPUNCTURE: CPT | Mod: PO

## 2019-08-22 PROCEDURE — 85025 COMPLETE CBC W/AUTO DIFF WBC: CPT

## 2019-08-22 PROCEDURE — 83036 HEMOGLOBIN GLYCOSYLATED A1C: CPT

## 2019-08-22 PROCEDURE — 80053 COMPREHEN METABOLIC PANEL: CPT

## 2019-08-23 ENCOUNTER — CLINICAL SUPPORT (OUTPATIENT)
Dept: REHABILITATION | Facility: HOSPITAL | Age: 54
End: 2019-08-23
Attending: OBSTETRICS & GYNECOLOGY
Payer: COMMERCIAL

## 2019-08-23 DIAGNOSIS — M62.9 DISORDER OF MUSCLE: ICD-10-CM

## 2019-08-23 PROCEDURE — 97161 PT EVAL LOW COMPLEX 20 MIN: CPT | Mod: PO

## 2019-08-23 PROCEDURE — 97110 THERAPEUTIC EXERCISES: CPT | Mod: PO

## 2019-08-23 NOTE — PLAN OF CARE
OUTPATIENT PHYSICAL THERAPY EVALUATION        Patient: Natasha Espinal   [facility] #:  [id number]    Date of treatment: 08/23/2019   Time in: 8:06  Time out: 9:00  Billable time: 50 minutes  # Visits: 1/20  Auth: exp 12/31  POC expiration: 11/23/2019      HISTORY      Natasha is a 53 y.o. female evaluated on 08/23/2019    Physician:  Tish Phipps, *   Diagnosis:   Encounter Diagnosis   Name Primary?    Disorder of muscle       Treatment ordered: Physical Therapy  Medical History:   Past Medical History:   Diagnosis Date    Breast cancer 12/2017    left     Hypertension       Surgical History:   Past Surgical History:   Procedure Laterality Date    BREAST BIOPSY Left 12/2017    BREAST BIOPSY Left 1983    exc bx    BREAST RECONSTRUCTION Bilateral     02/2018-05/2018 bryno flap    deviated septum repair      HYSTERECTOMY  2011    MASTECTOMY Bilateral 02/2018    OOPHORECTOMY      scar tissue removal in airway  Bilateral 08/01/2018      Medications:   Current Outpatient Medications   Medication Sig    amLODIPine (NORVASC) 5 MG tablet TAKE 1 TABLET BY MOUTH EVERY DAY IN THE EVENING    anastrozole (ARIMIDEX) 1 mg Tab Take 1 mg by mouth.    ascorbate calcium 500 mg Tab Take 1 tablet by mouth.    cetirizine (ZYRTEC) 10 MG tablet Take 10 mg by mouth once daily.    fish oil-omega-3 fatty acids 300-1,000 mg capsule Take 1 g by mouth.    losartan-hydrochlorothiazide 50-12.5 mg (HYZAAR) 50-12.5 mg per tablet TAKE 1 TABLET BY MOUTH EVERY DAY    melatonin 5 mg Tab Take by mouth.    MULTIVIT-IRON-MIN-FOLIC ACID 3,500-18-0.4 UNIT-MG-MG ORAL CHEW Take by mouth once daily.    prasterone, dhea, (INTRAROSA) 6.5 mg Inst Place 6.5 mg vaginally every evening.    traZODone (DESYREL) 50 MG tablet Take 1 tablet (50 mg total) by mouth nightly.    venlafaxine (EFFEXOR-XR) 37.5 MG 24 hr capsule Take 1 capsule (37.5 mg total) by mouth once daily.    vitamin D 1000 units Tab Take 1,000 Units by mouth once daily.      No current facility-administered medications for this visit.        Allergies:   Review of patient's allergies indicates:   Allergen Reactions    Sulfa (sulfonamide antibiotics) Nausea Only        Precautions: universal    OB/GYN History:  childbirth vaginal delivery, painful vaginal penetration and perineal laceration; hysterectomy    Bladder/Bowel History: none per pt      SUBJECTIVE     Date of onset: 2017  History of current complaint: pt reports that she was taken off of hormones after her breast cancer diagnosis; she went on to have 4 surgeries in a short amount of time, and developed sexual pain during that time.  Using lubricant helps with the pain at the introitus, but she also has deep pain.  Changing positions doesn't help.    Patient's goals for therapy: to be able to have intercourse without pain.    Pain: Patient reports 0/10, with 0 being the lowest and 10 being the highest. (8/10 with attempts at intercourse.)    Activities that cause symptoms: sexual activity    Previous treatment included medication    Sexually active? Yes    Frequency of urination:   Daytime: 5-6           Nighttime: at least once    Difficulty initiating urine stream: No  Urine stream: strong  Complete emptying: Yes  Bladder leakage: No    Frequency of bowel movements: once every 2 days  Difficulty initiating BM: No  Quality/Shape of BM: soft formed stools  Colon leakage: No    Types of fluid intake: water and occasional tea    Current exercise:water aerobics, yoga, walking    Occupation: Pt is a retired teacher.    OBJECTIVE     ORTHO SCREEN  Posture: decreased lumbar lordosis  Pelvic alignment: no sign of deviations noted in supine    ABDOMINALS  Scarring: low transverse abdominal incisional scar from abdominoplasty.  Diastasis: none   Abdominal strength: Rectus: 3/5     TA: minimally palpable contraction noted  Chaperone: declined  Consent signed: Yes    VAGINAL PELVIC FLOOR EXAM    EXTERNAL ASSESSMENT  Introitus:  WNL  Skin condition: WNL  Scarring: episiotomy scar noted   Sensation: WNL   Pain:  none  Voluntary contraction: visible lift (minimal)  Voluntary relaxation: visible drop  Involuntary contraction: bulge  Bearing down: bulge  Perineal descent: absent      INTERNAL ASSESSMENT  Pain: levators non-tender; L OI tender to palpation- this diminished with L anterior pelvic tilting.    Sensation: able to localize pressure appropriately  Vaginal vault: WNL   Muscle Bulk: hypertonus   Muscle Power: 2/5      Quality of contraction: decreased hold and slow relaxation   Specificity: patient contracts: gluts   Coordination: tends to hold breath during PFM contraction   Prolapse check:redundant tissue noted in cough both anteriorly and posteriorly    SEMG EVALUATION: Deferred due to time constraints    Functional Limitations Reports   Tool: FOTO PFDI Pain Survey  Score: 8% Limitation     TREATMENT      Therapeutic Exercise to develop coordination for 10 minutes including: diaphragmatic breathing- we also spoke about postural adjustments during attempts at intercourse.      Education: instructed on general anatomy/physiology of urinary/bowel system; discussed plan of care with patient; instructed in benefits/risks of treatment; instructed in alternative methods of treatment; instructed in risks of refusing treatment; patient agreed to treatment plan.     Also educated in: anatomy/physiology of pelvic floor and posture/body mechanices    ASSESSMENT      This is a 53 y.o. female referred to outpatient physical therapy and presents with a medical diagnosis of dyspareunia. Patient will benefit from skilled physical therapy to maximize her muscle flexibility to allow more comfortable intercourse.    Educational/Spiritual/Cultural needs: none  Abuse/Neglect: no signs  Nutritional Status: WDWN   Fall Risk: none    Pt's spiritual, cultural and educational needs considered and pt agreeable to plan of care and goals as stated below:      Medical necessity is demonstrated by the following IMPAIRMENTS/PROBLEMS     History  Co-morbidities and personal factors that may impact the plan of care Examination  Body Structures and Functions, activity limitations and participation restrictions that may impact the plan of care Clinical Presentation   Decision Making/ Complexity Score   Co-morbidities:   Breast CA; s/p mastectomy with SHRUTHI flap              Personal Factors:    Body Regions/Systems/Functions:    poor knowledge of body mechanics and posture, poor trunk stability, pelvic floor tenderness, decreased pelvic muscle strength and increased tension of the pelvic muscles           Activity limitations:   Barriers to Learning: none  Environmental Barriers: none noted    Participation Restrictions:   Pt cannot have intercourse without pain       Stable and uncomplicated low           PLAN    Frequency: once per 2 weeks  Duration: 12 weeks    Long Term Goals: 12 weeks   Pt will report successfully having intercourse with < or = 2/10 pain for an improvement in activity tolerance.   Pt/family will be independent with HEP for continued self-management of symptoms.    Physical therapy will include: therapeutic exercises, therapeutic activity, neuromuscular re-education, manual therapy and patient/family education; manual therapy next session with eventual wand instruction      Therapist: Radha Henderson, PT, BCB-PMD  8/23/2019

## 2019-08-23 NOTE — PATIENT INSTRUCTIONS
Home Exercise Program: 08/23/2019    DIAPHRAGMATIC BREATHING      Start by lying on your back or reclining in a chair in a relaxed position. Place hands on your lower belly.   Relax your jaw by placing your tongue on the roof of your mouth and keeping your teeth slightly apart.    Take a deep breath in, letting the abdomen expand and rise while you keep your upper chest, neck and shoulders relaxed.    As you breathe out, allow your abdomen and chest to fall. Exhale completely.   It doesn't matter if you breathe in/out through your nose and/or mouth. Do whichever feels comfortable.   Remember to breathe slowly.  Do not force your breathing. Do not hold your breath.   Repeat for 5 minutes every day.        With intercourse: try lying with your right leg bent, left leg straight.  Try tilting your pelvis forward or back and note how (or if) it changes your discomfort.

## 2019-08-28 ENCOUNTER — OFFICE VISIT (OUTPATIENT)
Dept: INTERNAL MEDICINE | Facility: CLINIC | Age: 54
End: 2019-08-28
Payer: COMMERCIAL

## 2019-08-28 VITALS
OXYGEN SATURATION: 97 % | BODY MASS INDEX: 33.96 KG/M2 | WEIGHT: 168.44 LBS | DIASTOLIC BLOOD PRESSURE: 82 MMHG | HEIGHT: 59 IN | SYSTOLIC BLOOD PRESSURE: 122 MMHG | HEART RATE: 88 BPM

## 2019-08-28 DIAGNOSIS — E66.9 CLASS 1 OBESITY WITH SERIOUS COMORBIDITY AND BODY MASS INDEX (BMI) OF 33.0 TO 33.9 IN ADULT, UNSPECIFIED OBESITY TYPE: ICD-10-CM

## 2019-08-28 DIAGNOSIS — E78.5 HYPERLIPIDEMIA, UNSPECIFIED HYPERLIPIDEMIA TYPE: ICD-10-CM

## 2019-08-28 DIAGNOSIS — Z23 FLU VACCINE NEED: ICD-10-CM

## 2019-08-28 DIAGNOSIS — F41.9 ANXIETY: ICD-10-CM

## 2019-08-28 DIAGNOSIS — I10 ESSENTIAL HYPERTENSION: Primary | ICD-10-CM

## 2019-08-28 DIAGNOSIS — R31.9 HEMATURIA, UNSPECIFIED TYPE: ICD-10-CM

## 2019-08-28 PROCEDURE — 99214 PR OFFICE/OUTPT VISIT, EST, LEVL IV, 30-39 MIN: ICD-10-PCS | Mod: 25,S$GLB,, | Performed by: INTERNAL MEDICINE

## 2019-08-28 PROCEDURE — 99214 OFFICE O/P EST MOD 30 MIN: CPT | Mod: 25,S$GLB,, | Performed by: INTERNAL MEDICINE

## 2019-08-28 PROCEDURE — 90471 IMMUNIZATION ADMIN: CPT | Mod: S$GLB,,, | Performed by: INTERNAL MEDICINE

## 2019-08-28 PROCEDURE — 3008F PR BODY MASS INDEX (BMI) DOCUMENTED: ICD-10-PCS | Mod: CPTII,S$GLB,, | Performed by: INTERNAL MEDICINE

## 2019-08-28 PROCEDURE — 3079F DIAST BP 80-89 MM HG: CPT | Mod: CPTII,S$GLB,, | Performed by: INTERNAL MEDICINE

## 2019-08-28 PROCEDURE — 3074F SYST BP LT 130 MM HG: CPT | Mod: CPTII,S$GLB,, | Performed by: INTERNAL MEDICINE

## 2019-08-28 PROCEDURE — 90686 IIV4 VACC NO PRSV 0.5 ML IM: CPT | Mod: S$GLB,,, | Performed by: INTERNAL MEDICINE

## 2019-08-28 PROCEDURE — 99999 PR PBB SHADOW E&M-EST. PATIENT-LVL III: CPT | Mod: PBBFAC,,, | Performed by: INTERNAL MEDICINE

## 2019-08-28 PROCEDURE — 3074F PR MOST RECENT SYSTOLIC BLOOD PRESSURE < 130 MM HG: ICD-10-PCS | Mod: CPTII,S$GLB,, | Performed by: INTERNAL MEDICINE

## 2019-08-28 PROCEDURE — 90471 FLU VACCINE (QUAD) GREATER THAN OR EQUAL TO 3YO PRESERVATIVE FREE IM: ICD-10-PCS | Mod: S$GLB,,, | Performed by: INTERNAL MEDICINE

## 2019-08-28 PROCEDURE — 90686 FLU VACCINE (QUAD) GREATER THAN OR EQUAL TO 3YO PRESERVATIVE FREE IM: ICD-10-PCS | Mod: S$GLB,,, | Performed by: INTERNAL MEDICINE

## 2019-08-28 PROCEDURE — 3008F BODY MASS INDEX DOCD: CPT | Mod: CPTII,S$GLB,, | Performed by: INTERNAL MEDICINE

## 2019-08-28 PROCEDURE — 99999 PR PBB SHADOW E&M-EST. PATIENT-LVL III: ICD-10-PCS | Mod: PBBFAC,,, | Performed by: INTERNAL MEDICINE

## 2019-08-28 PROCEDURE — 3079F PR MOST RECENT DIASTOLIC BLOOD PRESSURE 80-89 MM HG: ICD-10-PCS | Mod: CPTII,S$GLB,, | Performed by: INTERNAL MEDICINE

## 2019-08-28 RX ORDER — AMLODIPINE BESYLATE 5 MG/1
5 TABLET ORAL NIGHTLY
Qty: 90 TABLET | Refills: 1 | Status: SHIPPED | OUTPATIENT
Start: 2019-08-28 | End: 2020-04-21 | Stop reason: SDUPTHER

## 2019-08-28 RX ORDER — ATORVASTATIN CALCIUM 10 MG/1
10 TABLET, FILM COATED ORAL NIGHTLY
Qty: 90 TABLET | Refills: 0 | Status: SHIPPED | OUTPATIENT
Start: 2019-08-28 | End: 2019-12-02 | Stop reason: SDUPTHER

## 2019-08-28 RX ORDER — LOSARTAN POTASSIUM AND HYDROCHLOROTHIAZIDE 12.5; 5 MG/1; MG/1
1 TABLET ORAL DAILY
Qty: 90 TABLET | Refills: 1 | Status: SHIPPED | OUTPATIENT
Start: 2019-08-28 | End: 2020-01-22

## 2019-08-28 RX ORDER — VENLAFAXINE HYDROCHLORIDE 37.5 MG/1
37.5 CAPSULE, EXTENDED RELEASE ORAL DAILY
Qty: 90 CAPSULE | Refills: 1 | Status: SHIPPED | OUTPATIENT
Start: 2019-08-28 | End: 2020-04-21

## 2019-08-28 NOTE — PROGRESS NOTES
Pt. ID: Natasha Espinal is a 53 y.o. female      Chief complaint: No chief complaint on file.      HPI: Pt. Here for f/u for HTN; she has started losartan HCTZ and BP is WNL; I reviewed labs dated 8/22/19; HGBA1C is 5.4; cholesterol is elevated and I will start lipitor; hematuria is noted; side effects explained weight is elevated but stable; she will start asa 81 mg QD    Review of Systems   Constitutional: Negative for chills and fever.   Respiratory: Negative for cough and shortness of breath.    Cardiovascular: Negative for chest pain.   Gastrointestinal: Negative for abdominal pain, nausea and vomiting.   Genitourinary: Negative for dysuria.         Objective:    Physical Exam   Constitutional: She is oriented to person, place, and time.   Eyes: EOM are normal.   Neck: Normal range of motion.   Cardiovascular: Normal rate, regular rhythm and normal heart sounds.   Pulmonary/Chest: Effort normal and breath sounds normal.   Abdominal: Soft. There is no tenderness. There is no rebound and no guarding.   Musculoskeletal: Normal range of motion.   Neurological: She is alert and oriented to person, place, and time.   Skin: No rash noted.         Health Maintenance   Topic Date Due    Pneumococcal Vaccine (Highest Risk) (1 of 3 - PCV13) 12/02/1983    Fecal Occult Blood Test (FOBT)/FitKit  06/15/2020    Lipid Panel  08/22/2024    TETANUS VACCINE  07/31/2027    Hepatitis C Screening  Addressed         Assessment:     1. Essential hypertension Well controlled   2. Hyperlipidemia, unspecified hyperlipidemia type Active   3. Hematuria, unspecified type Active   4. Anxiety Well controlled   5. Class 1 obesity with serious comorbidity and body mass index (BMI) of 33.0 to 33.9 in adult, unspecified obesity type Sub-optimally controlled   6. Flu vaccine need Active         Plan: Essential hypertension  Comments:  continue current regimen and encouraged low Na diet and weight loss   Orders:  -     Comprehensive  metabolic panel; Future; Expected date: 09/18/2019  -     Urinalysis; Future; Expected date: 09/18/2019  -     amLODIPine (NORVASC) 5 MG tablet; Take 1 tablet (5 mg total) by mouth every evening.  Dispense: 90 tablet; Refill: 1  -     losartan-hydrochlorothiazide 50-12.5 mg (HYZAAR) 50-12.5 mg per tablet; Take 1 tablet by mouth once daily.  Dispense: 90 tablet; Refill: 1    Hyperlipidemia, unspecified hyperlipidemia type  Comments:  start lipitor 10 mg QPM and encouraged diet modification; repeat LFT's in 3 weeks and side effects explained   Orders:  -     atorvastatin (LIPITOR) 10 MG tablet; Take 1 tablet (10 mg total) by mouth every evening.  Dispense: 90 tablet; Refill: 0  -     Comprehensive metabolic panel; Future; Expected date: 09/18/2019    Hematuria, unspecified type  Comments:  repeat U/A for verification   Orders:  -     Urinalysis; Future; Expected date: 09/18/2019    Anxiety  Comments:  continue current regimen   Orders:  -     venlafaxine (EFFEXOR-XR) 37.5 MG 24 hr capsule; Take 1 capsule (37.5 mg total) by mouth once daily.  Dispense: 90 capsule; Refill: 1    Class 1 obesity with serious comorbidity and body mass index (BMI) of 33.0 to 33.9 in adult, unspecified obesity type  Comments:  encouraged diet and explained risks     Flu vaccine need  -     Influenza - Quadrivalent (3 years & older) (PF)        Problem List Items Addressed This Visit        Psychiatric    Anxiety    Relevant Medications    venlafaxine (EFFEXOR-XR) 37.5 MG 24 hr capsule       Cardiac/Vascular    Hyperlipemia    Relevant Medications    atorvastatin (LIPITOR) 10 MG tablet    Other Relevant Orders    Comprehensive metabolic panel    Essential hypertension - Primary    Relevant Medications    amLODIPine (NORVASC) 5 MG tablet    losartan-hydrochlorothiazide 50-12.5 mg (HYZAAR) 50-12.5 mg per tablet    Other Relevant Orders    Comprehensive metabolic panel    Urinalysis       Renal/    Hematuria    Relevant Orders    Urinalysis        ID    Flu vaccine need    Relevant Orders    Influenza - Quadrivalent (3 years & older) (PF) (Completed)       Endocrine    Class 1 obesity with serious comorbidity and body mass index (BMI) of 33.0 to 33.9 in adult

## 2019-09-06 ENCOUNTER — CLINICAL SUPPORT (OUTPATIENT)
Dept: REHABILITATION | Facility: HOSPITAL | Age: 54
End: 2019-09-06
Attending: OBSTETRICS & GYNECOLOGY
Payer: COMMERCIAL

## 2019-09-06 DIAGNOSIS — M62.9 DISORDER OF MUSCLE: ICD-10-CM

## 2019-09-06 PROCEDURE — 97140 MANUAL THERAPY 1/> REGIONS: CPT | Mod: PO

## 2019-09-06 NOTE — PATIENT INSTRUCTIONS
Postures for more comfortable intercourse:     1. Lying on your back with towel roll under low back.  Allow your left knee to fan out to the side without drawing it up toward your chest.      2. If a knee needs to be in toward your chest, let it be the right knee.    [email or web address] for chris

## 2019-09-06 NOTE — PROGRESS NOTES
OUTPATIENT PHYSICAL THERAPY DAILY NOTE       Patient: Natasha Espinal   [facility] #:  [id number]    Diagnosis:   Encounter Diagnosis   Name Primary?    Disorder of muscle       Date of treatment: 09/06/2019     Time in: 1:10  Time out: 1:55  Billable time: 40 minutes  Visit #: 2  Auth: 20 exp 12/31  POC expiration: 11/23/2019    SUBJECTIVE   Pt reports: compliance with her HEP.    Pain: 0/10 on VAS scale  Pain location: NA    OBJECTIVE     Manual Therapy to develop extensibility and trigger point release for 40 minutes including: ischemic pressure and contract/relax to the L OI.  When asked to perform DB she was noted to get very little abdominal distention, with no palpable drop in the levator ani (they were not tender today).  L OI tenderness diminished with passive hip ER (gravity assisted), and with ischemic pressure both intravaginally and externally.  She actually felt more release with external ischemic pressure.  An alignment check was performed prior to internal work, and she was noted to demonstrate L posterior innominate rotation in supine.  90 degree push pull and L anterior rotation mobs were performed, and her alignment was noted to be corrected.  She was instructed in positions of comfort for intercourse, in the 2 above self mobs, and was given the [facility] medical website to order a wand for instruction in self release.      Education: Discussed progression of plan of care with patient; educated pt in activity modification; reviewed HEP with pt. Pt demonstrated and verbalized understanding of all instruction and was provided with a handout of HEP (see Patient Instructions).      ASSESSMENT      Pt tolerated entire treatment well with no visible adverse effects. SL external release was most effective- hopefully correcting alignment and maintaining correction will also help long term.    Will the patient continue to benefit from skilled PT intervention? Yes  Medical necessity demonstrated by: Skilled PT  supervision required for HEP and treatment progression  Progress towards goals:  good  New/Revised goals: none      PLAN     Continue with established Plan of Care, working toward established PT goals.

## 2019-09-23 ENCOUNTER — TELEPHONE (OUTPATIENT)
Dept: FAMILY MEDICINE | Facility: CLINIC | Age: 54
End: 2019-09-23

## 2019-09-23 DIAGNOSIS — R31.9 HEMATURIA, UNSPECIFIED TYPE: Primary | ICD-10-CM

## 2019-09-23 NOTE — TELEPHONE ENCOUNTER
Notify pt. All other labs show no significant abnormality; pt. Should repeat U/A in 1 month to see if trace hematuria is present

## 2019-09-25 ENCOUNTER — TELEPHONE (OUTPATIENT)
Dept: INTERNAL MEDICINE | Facility: CLINIC | Age: 54
End: 2019-09-25

## 2019-09-26 ENCOUNTER — CLINICAL SUPPORT (OUTPATIENT)
Dept: REHABILITATION | Facility: HOSPITAL | Age: 54
End: 2019-09-26
Attending: OBSTETRICS & GYNECOLOGY
Payer: COMMERCIAL

## 2019-09-26 DIAGNOSIS — M62.9 DISORDER OF MUSCLE: ICD-10-CM

## 2019-09-26 PROCEDURE — 97110 THERAPEUTIC EXERCISES: CPT | Mod: PO

## 2019-10-01 ENCOUNTER — TELEPHONE (OUTPATIENT)
Dept: OBSTETRICS AND GYNECOLOGY | Facility: CLINIC | Age: 54
End: 2019-10-01

## 2019-10-01 NOTE — TELEPHONE ENCOUNTER
----- Message from Felecia Edwards sent at 9/30/2019  1:58 PM CDT -----  Contact: MARIO SANCHEZ [[id number]]  Name of Who is Calling:MARIO SANCHEZ [[id number]]       What is the request in detail: Patient is calling to reschedule her 10/10 appointment .      Can the clinic reply by [facility]: Y       What Number to Call Back if not in [facility]: [phone]

## 2019-10-02 ENCOUNTER — PATIENT MESSAGE (OUTPATIENT)
Dept: OBSTETRICS AND GYNECOLOGY | Facility: CLINIC | Age: 54
End: 2019-10-02

## 2019-10-15 ENCOUNTER — OFFICE VISIT (OUTPATIENT)
Dept: FAMILY MEDICINE | Facility: CLINIC | Age: 54
End: 2019-10-15
Payer: COMMERCIAL

## 2019-10-15 VITALS
DIASTOLIC BLOOD PRESSURE: 88 MMHG | HEART RATE: 86 BPM | OXYGEN SATURATION: 94 % | TEMPERATURE: 99 F | HEIGHT: 59 IN | WEIGHT: 169.19 LBS | BODY MASS INDEX: 34.11 KG/M2 | SYSTOLIC BLOOD PRESSURE: 122 MMHG

## 2019-10-15 DIAGNOSIS — Z85.3 HISTORY OF BREAST CANCER: ICD-10-CM

## 2019-10-15 DIAGNOSIS — E78.2 MIXED HYPERLIPIDEMIA: ICD-10-CM

## 2019-10-15 DIAGNOSIS — I10 ESSENTIAL HYPERTENSION: Primary | ICD-10-CM

## 2019-10-15 DIAGNOSIS — R31.9 HEMATURIA, UNSPECIFIED TYPE: ICD-10-CM

## 2019-10-15 DIAGNOSIS — F51.01 PRIMARY INSOMNIA: ICD-10-CM

## 2019-10-15 DIAGNOSIS — F41.9 ANXIETY: ICD-10-CM

## 2019-10-15 PROBLEM — C50.412 PRIMARY CANCER OF UPPER OUTER QUADRANT OF LEFT BREAST: Status: RESOLVED | Noted: 2018-01-10 | Resolved: 2019-10-15

## 2019-10-15 PROBLEM — Z01.810 PREOP CARDIOVASCULAR EXAM: Status: RESOLVED | Noted: 2018-02-02 | Resolved: 2019-10-15

## 2019-10-15 PROBLEM — Z79.811 PROPHYLACTIC USE OF ANASTROZOLE (ARIMIDEX): Status: RESOLVED | Noted: 2018-05-10 | Resolved: 2019-10-15

## 2019-10-15 PROBLEM — M62.9 DISORDER OF MUSCLE: Status: RESOLVED | Noted: 2019-08-23 | Resolved: 2019-10-15

## 2019-10-15 PROBLEM — Z91.89 AT RISK FOR LYMPHEDEMA: Status: RESOLVED | Noted: 2018-01-19 | Resolved: 2019-10-15

## 2019-10-15 LAB
BACTERIA #/AREA URNS AUTO: NORMAL /HPF
BILIRUB UR QL STRIP: NEGATIVE
CLARITY UR REFRACT.AUTO: CLEAR
COLOR UR AUTO: YELLOW
GLUCOSE UR QL STRIP: NEGATIVE
HGB UR QL STRIP: NEGATIVE
KETONES UR QL STRIP: NEGATIVE
LEUKOCYTE ESTERASE UR QL STRIP: NEGATIVE
MICROSCOPIC COMMENT: NORMAL
NITRITE UR QL STRIP: NEGATIVE
PH UR STRIP: 6 [PH] (ref 5–8)
PROT UR QL STRIP: NEGATIVE
RBC #/AREA URNS AUTO: 0 /HPF (ref 0–4)
SP GR UR STRIP: 1.01 (ref 1–1.03)
URN SPEC COLLECT METH UR: NORMAL

## 2019-10-15 PROCEDURE — 3008F PR BODY MASS INDEX (BMI) DOCUMENTED: ICD-10-PCS | Mod: CPTII,S$GLB,, | Performed by: INTERNAL MEDICINE

## 2019-10-15 PROCEDURE — 3008F BODY MASS INDEX DOCD: CPT | Mod: CPTII,S$GLB,, | Performed by: INTERNAL MEDICINE

## 2019-10-15 PROCEDURE — 3079F PR MOST RECENT DIASTOLIC BLOOD PRESSURE 80-89 MM HG: ICD-10-PCS | Mod: CPTII,S$GLB,, | Performed by: INTERNAL MEDICINE

## 2019-10-15 PROCEDURE — 99999 PR PBB SHADOW E&M-EST. PATIENT-LVL III: ICD-10-PCS | Mod: PBBFAC,,, | Performed by: INTERNAL MEDICINE

## 2019-10-15 PROCEDURE — 99213 OFFICE O/P EST LOW 20 MIN: CPT | Mod: S$GLB,,, | Performed by: INTERNAL MEDICINE

## 2019-10-15 PROCEDURE — 99213 PR OFFICE/OUTPT VISIT, EST, LEVL III, 20-29 MIN: ICD-10-PCS | Mod: S$GLB,,, | Performed by: INTERNAL MEDICINE

## 2019-10-15 PROCEDURE — 81001 URINALYSIS AUTO W/SCOPE: CPT

## 2019-10-15 PROCEDURE — 99999 PR PBB SHADOW E&M-EST. PATIENT-LVL III: CPT | Mod: PBBFAC,,, | Performed by: INTERNAL MEDICINE

## 2019-10-15 PROCEDURE — 3074F SYST BP LT 130 MM HG: CPT | Mod: CPTII,S$GLB,, | Performed by: INTERNAL MEDICINE

## 2019-10-15 PROCEDURE — 3079F DIAST BP 80-89 MM HG: CPT | Mod: CPTII,S$GLB,, | Performed by: INTERNAL MEDICINE

## 2019-10-15 PROCEDURE — 3074F PR MOST RECENT SYSTOLIC BLOOD PRESSURE < 130 MM HG: ICD-10-PCS | Mod: CPTII,S$GLB,, | Performed by: INTERNAL MEDICINE

## 2019-10-15 RX ORDER — ACETAMINOPHEN 500 MG
1 TABLET ORAL DAILY
COMMUNITY

## 2019-10-15 RX ORDER — ACETAMINOPHEN AND PHENYLEPHRINE HCL 325; 5 MG/1; MG/1
TABLET ORAL
COMMUNITY
End: 2020-11-10

## 2019-10-15 RX ORDER — ASPIRIN 81 MG/1
81 TABLET ORAL DAILY
COMMUNITY

## 2019-10-15 NOTE — PROGRESS NOTES
[facility] Note    Chief Complaint      Chief Complaint   Patient presents with    Establish Care     new pcp    Blood     traces of bllod in urin past two test from last doctor       History of Present Illness      Natasha Espinal is a 53 y.o. female with chronic conditions of HTN, HLD, hx of breast cancer, anxiety, insomnia who presents today for: establish care and review chronic conditions.  Complains of microscopic hematuria on last two UA 8/22/19 and 9/20/19.  No gross hematuria.  No dysuria, increased urgency, increased frequency, urinary hesitancy.  No flank pain.  Has happened 7 yrs ago and had normal work up.    HTN: BP at goal on amlodipine, losartan-hydrochlorothiazide  HLD: Controlled on atorvastatin.  Started in 8/2019 when . 10 yr risk 3.1%.   Hx of breast cancer: Sees oncology, Dr. Olivier.  On anastrazole.  Has aromatase inhibitor associated myalgias which are tolerable at the moment.    Anxiety: Controlled on effexor.  Pt would like to try discontinuing.    Insomnia: Controlled on trazodone.  Doing well on this regimen.      Past Medical History:  Past Medical History:   Diagnosis Date    Breast cancer 12/2017    left     Hypertension        Past Surgical History:   has a past surgical history that includes Hysterectomy (2011); Oophorectomy; deviated septum repair; Mastectomy (Bilateral, 02/2018); Breast biopsy (Left, 12/2017); Breast biopsy (Left, 1983); Breast reconstruction (Bilateral); and scar tissue removal in airway  (Bilateral, 08/01/2018).    Family History:  family history includes Breast cancer (age of onset: 43) in her paternal aunt; Breast cancer (age of onset: 55) in her paternal aunt; Breast cancer (age of onset: 62) in her paternal aunt; Colon cancer in her paternal uncle; Heart disease in her father.     Social History:  Social History     Tobacco Use    Smoking status: Never Smoker    Smokeless tobacco: Never Used   Substance Use Topics     Alcohol use: No    Drug use: No       Review of Systems   Constitutional: Negative for chills, fever and malaise/fatigue.   Respiratory: Negative for shortness of breath.    Cardiovascular: Negative for chest pain.   Gastrointestinal: Negative for constipation, diarrhea, nausea and vomiting.   Skin: Negative for rash.   Neurological: Negative for weakness.        Medications:  Outpatient Encounter Medications as of 10/15/2019   Medication Sig Dispense Refill    amLODIPine (NORVASC) 5 MG tablet Take 1 tablet (5 mg total) by mouth every evening. 90 tablet 1    anastrozole (ARIMIDEX) 1 mg Tab Take 1 mg by mouth.      ascorbate calcium 500 mg Tab Take 1 tablet by mouth.      aspirin (ECOTRIN) 81 MG EC tablet Take 81 mg by mouth once daily.      atorvastatin (LIPITOR) 10 MG tablet Take 1 tablet (10 mg total) by mouth every evening. 90 tablet 0    biotin 10,000 mcg Cap Take by mouth.      cetirizine (ZYRTEC) 10 MG tablet Take 10 mg by mouth once daily.      cholecalciferol, vitamin D3, (VITAMIN D3) 2,000 unit Cap Take 1 capsule by mouth once daily.      fish oil-omega-3 fatty acids 300-1,000 mg capsule Take 1 g by mouth.      losartan-hydrochlorothiazide 50-12.5 mg (HYZAAR) 50-12.5 mg per tablet Take 1 tablet by mouth once daily. 90 tablet 1    melatonin 5 mg Tab Take by mouth.      multivitamin/iron/folic acid (CENTRUM WOMEN ORAL) Take by mouth.      traZODone (DESYREL) 50 MG tablet Take 1 tablet (50 mg total) by mouth nightly. 30 tablet 11    venlafaxine (EFFEXOR-XR) 37.5 MG 24 hr capsule Take 1 capsule (37.5 mg total) by mouth once daily. 90 capsule 1    vitamin D 1000 units Tab Take 1,000 Units by mouth once daily.      MULTIVIT-IRON-MIN-FOLIC ACID 3,500-18-0.4 UNIT-MG-MG ORAL CHEW Take by mouth once daily.      prasterone, dhea, (INTRAROSA) 6.5 mg Inst Place 6.5 mg vaginally every evening. (Patient not taking: Reported on 10/15/2019) 30 each 11     No facility-administered encounter medications on  "file as of 10/15/2019.        Allergies:  Review of patient's allergies indicates:   Allergen Reactions    Sulfa (sulfonamide antibiotics) Nausea Only       Health Maintenance:  Immunization History   Administered Date(s) Administered    Influenza - Quadrivalent 11/24/2014    Influenza - Quadrivalent - PF (6 months and older) 08/28/2019    Tdap 07/31/2017      Health Maintenance   Topic Date Due    Pneumococcal Vaccine (Highest Risk) (1 of 3 - PCV13) 12/02/1983    Fecal Occult Blood Test (FOBT)/FitKit  06/15/2020    Lipid Panel  08/22/2024    TETANUS VACCINE  07/31/2027    Hepatitis C Screening  Addressed        Physical Exam      Vital Signs  Temp: 98.5 °F (36.9 °C)  Temp src: Oral  Pulse: 86  SpO2: (!) 94 %  BP: 122/88  BP Location: Right arm  Patient Position: Sitting  Height and Weight  Height: 4' 11" (149.9 cm)  Weight: 76.8 kg (169 lb 3.3 oz)  BSA (Calculated - sq m): 1.79 sq meters  BMI (Calculated): 34.2  Weight in (lb) to have BMI = 25: 123.5]    Physical Exam   Constitutional: She appears well-developed and well-nourished.   HENT:   Head: Normocephalic and atraumatic.   Right Ear: External ear normal.   Left Ear: External ear normal.   Mouth/Throat: Oropharynx is clear and moist.   Eyes: Pupils are equal, round, and reactive to light. Conjunctivae and EOM are normal.   Neck: Carotid bruit is not present.   Cardiovascular: Normal rate, regular rhythm, normal heart sounds and intact distal pulses.   No murmur heard.  Pulmonary/Chest: Effort normal and breath sounds normal. She has no wheezes. She has no rales.   Abdominal: Soft. Bowel sounds are normal. She exhibits no distension. There is no hepatosplenomegaly. There is no tenderness.   Musculoskeletal: She exhibits no edema.   Vitals reviewed.       Laboratory:  CBC:  Recent Labs   Lab 04/25/18  1042 12/05/18  1343 08/22/19  0830   WBC 5.83 7.18 7.20   RBC 4.86 4.57 4.63   Hemoglobin 13.9 13.2 13.1   Hematocrit 42.1 40.1 40.2   Platelets 304 298 " 280   Mean Corpuscular Volume 87 88 87   Mean Corpuscular Hemoglobin 28.6 28.9 28.3   Mean Corpuscular Hemoglobin Conc 33.0 32.9 32.6     CMP:  Recent Labs   Lab 08/22/19 0830 09/20/19  0927   Glucose 95 101   Calcium 10.3 9.8   Albumin 4.1 4.6   Total Protein 7.7 7.7   Sodium 141 142   Potassium 3.6 3.8   CO2 30 H 28   Chloride 101 105   BUN, Bld 18 14   Alkaline Phosphatase 95 91   ALT 29 36   AST 18 28   Total Bilirubin 0.5 0.4     URINALYSIS:  Recent Labs   Lab 09/20/19 0927   Color, UA Yellow   Specific Gravity, UA 1.020   pH, UA 8.0   Protein, UA Negative   Bacteria Occasional   Nitrite, UA Negative   Leukocytes, UA 1+ A   Urobilinogen, UA Negative      LIPIDS:  Recent Labs   Lab 08/22/19 0830   HDL 43   Cholesterol 215 H   Triglycerides 238 H   LDL Cholesterol 124.4   Hdl/Cholesterol Ratio 20.0   Non-HDL Cholesterol 172   Total Cholesterol/HDL Ratio 5.0     TSH:      A1C:  Recent Labs   Lab 08/22/19 0830   Hemoglobin A1C 5.4       Assessment/Plan     Natasha Espinal is a 53 y.o.female with:    1. Hematuria, unspecified type  - URINALYSIS  Check UA for persistent hematuria and if present, will arrange to see urology.    2. Essential hypertension  - Comprehensive metabolic panel; Future  - Lipid panel; Future  Continue current meds.    3. Mixed hyperlipidemia  - Comprehensive metabolic panel; Future  - Lipid panel; Future  Continue current meds.    4. Anxiety  Continue current meds.    5. History of breast cancer  Continue current meds.    6. Primary insomnia   Continue current meds.      Chronic conditions status updated as per HPI.  Other than changes above, cont current medications and maintain follow up with specialists.  Return to clinic in 2 months as scheduled to follow up response to statin.    Ta Espinoza MD  [facility]

## 2019-10-16 NOTE — PROGRESS NOTES
Urinalysis does not show any trace of blood.  Whatever cause of microscopic hematuria must have resolved.

## 2019-10-22 ENCOUNTER — PATIENT OUTREACH (OUTPATIENT)
Dept: ADMINISTRATIVE | Facility: OTHER | Age: 54
End: 2019-10-22

## 2019-10-24 ENCOUNTER — OFFICE VISIT (OUTPATIENT)
Dept: OBSTETRICS AND GYNECOLOGY | Facility: CLINIC | Age: 54
End: 2019-10-24
Attending: OBSTETRICS & GYNECOLOGY
Payer: COMMERCIAL

## 2019-10-24 VITALS
DIASTOLIC BLOOD PRESSURE: 78 MMHG | BODY MASS INDEX: 34.04 KG/M2 | HEIGHT: 59 IN | WEIGHT: 168.88 LBS | SYSTOLIC BLOOD PRESSURE: 138 MMHG

## 2019-10-24 DIAGNOSIS — N94.19 DYSPAREUNIA DUE TO MEDICAL CONDITION IN FEMALE: ICD-10-CM

## 2019-10-24 DIAGNOSIS — Z79.811 USE OF AROMATASE INHIBITORS: ICD-10-CM

## 2019-10-24 DIAGNOSIS — M25.50 ARTHRALGIA, UNSPECIFIED JOINT: ICD-10-CM

## 2019-10-24 DIAGNOSIS — Z85.3 HISTORY OF BREAST CANCER: Primary | ICD-10-CM

## 2019-10-24 DIAGNOSIS — N95.2 VAGINAL ATROPHY: ICD-10-CM

## 2019-10-24 PROCEDURE — 3078F DIAST BP <80 MM HG: CPT | Mod: CPTII,S$GLB,, | Performed by: OBSTETRICS & GYNECOLOGY

## 2019-10-24 PROCEDURE — 3075F PR MOST RECENT SYSTOLIC BLOOD PRESS GE 130-139MM HG: ICD-10-PCS | Mod: CPTII,S$GLB,, | Performed by: OBSTETRICS & GYNECOLOGY

## 2019-10-24 PROCEDURE — 99213 OFFICE O/P EST LOW 20 MIN: CPT | Mod: S$GLB,,, | Performed by: OBSTETRICS & GYNECOLOGY

## 2019-10-24 PROCEDURE — 3008F BODY MASS INDEX DOCD: CPT | Mod: CPTII,S$GLB,, | Performed by: OBSTETRICS & GYNECOLOGY

## 2019-10-24 PROCEDURE — 3008F PR BODY MASS INDEX (BMI) DOCUMENTED: ICD-10-PCS | Mod: CPTII,S$GLB,, | Performed by: OBSTETRICS & GYNECOLOGY

## 2019-10-24 PROCEDURE — 3075F SYST BP GE 130 - 139MM HG: CPT | Mod: CPTII,S$GLB,, | Performed by: OBSTETRICS & GYNECOLOGY

## 2019-10-24 PROCEDURE — 3078F PR MOST RECENT DIASTOLIC BLOOD PRESSURE < 80 MM HG: ICD-10-PCS | Mod: CPTII,S$GLB,, | Performed by: OBSTETRICS & GYNECOLOGY

## 2019-10-24 PROCEDURE — 99213 PR OFFICE/OUTPT VISIT, EST, LEVL III, 20-29 MIN: ICD-10-PCS | Mod: S$GLB,,, | Performed by: OBSTETRICS & GYNECOLOGY

## 2019-10-24 NOTE — PROGRESS NOTES
SUBJECTIVE:   53 y.o. female  presents today for follow-up . No LMP recorded. Patient has had a hysterectomy..  She reports feeling much better.  She is using Lubrigyn, SYLK, and intra Ivy.    She reports that all of these are working well and she has no vaginal dryness or pain with intercourse  She did meet with pelvic floor physical therapist and the sessions have significantly decreased her pain. Her anxiety is better and she is weaning off of Effexor.  Trazodone is helping her with her sleep  She has an upcoming appointment with her oncologist  She still has arthralgias in her hands but reports that she is tolerating these    Past Medical History:   Diagnosis Date    Breast cancer 2017    left     Hypertension      Past Surgical History:   Procedure Laterality Date    BREAST BIOPSY Left 2017    BREAST BIOPSY Left     exc bx    BREAST RECONSTRUCTION Bilateral     2018-2018 bryon flap    deviated septum repair      HYSTERECTOMY  2011    MASTECTOMY Bilateral 2018    OOPHORECTOMY      scar tissue removal in airway  Bilateral 2018     Social History     Socioeconomic History    Marital status:      Spouse name: Not on file    Number of children: Not on file    Years of education: Not on file    Highest education level: Not on file   Occupational History    Not on file   Social Needs    Financial resource strain: Not on file    Food insecurity:     Worry: Not on file     Inability: Not on file    Transportation needs:     Medical: Not on file     Non-medical: Not on file   Tobacco Use    Smoking status: Never Smoker    Smokeless tobacco: Never Used   Substance and Sexual Activity    Alcohol use: No    Drug use: No    Sexual activity: Yes     Partners: Male     Birth control/protection: See Surgical Hx   Lifestyle    Physical activity:     Days per week: Not on file     Minutes per session: Not on file    Stress: Not on file   Relationships    Social  connections:     Talks on phone: Not on file     Gets together: Not on file     Attends Gnosticism service: Not on file     Active member of club or organization: Not on file     Attends meetings of clubs or organizations: Not on file     Relationship status: Not on file   Other Topics Concern    Not on file   Social History Narrative    Not on file     Family History   Problem Relation Age of Onset    Heart disease Father     Breast cancer Paternal Aunt 43    Breast cancer Paternal Aunt 62    Breast cancer Paternal Aunt 55    Colon cancer Paternal Uncle     Ovarian cancer Neg Hx     Cancer Neg Hx      OB History    Para Term  AB Living   2 2 2         SAB TAB Ectopic Multiple Live Births                  # Outcome Date GA Lbr Vijay/2nd Weight Sex Delivery Anes PTL Lv   2 Term            1 Term                    Current Outpatient Medications   Medication Sig Dispense Refill    amLODIPine (NORVASC) 5 MG tablet Take 1 tablet (5 mg total) by mouth every evening. 90 tablet 1    anastrozole (ARIMIDEX) 1 mg Tab Take 1 mg by mouth.      ascorbate calcium 500 mg Tab Take 1 tablet by mouth.      aspirin (ECOTRIN) 81 MG EC tablet Take 81 mg by mouth once daily.      atorvastatin (LIPITOR) 10 MG tablet Take 1 tablet (10 mg total) by mouth every evening. 90 tablet 0    biotin 10,000 mcg Cap Take by mouth.      cetirizine (ZYRTEC) 10 MG tablet Take 10 mg by mouth once daily.      cholecalciferol, vitamin D3, (VITAMIN D3) 2,000 unit Cap Take 1 capsule by mouth once daily.      fish oil-omega-3 fatty acids 300-1,000 mg capsule Take 1 g by mouth.      losartan-hydrochlorothiazide 50-12.5 mg (HYZAAR) 50-12.5 mg per tablet Take 1 tablet by mouth once daily. 90 tablet 1    melatonin 5 mg Tab Take by mouth.      MULTIVIT-IRON-MIN-FOLIC ACID 3,500-18-0.4 UNIT-MG-MG ORAL CHEW Take by mouth once daily.      multivitamin/iron/folic acid (CENTRUM WOMEN ORAL) Take by mouth.      prasterone, dhea,  (INTRAROSA) 6.5 mg Inst Place 6.5 mg vaginally every evening. 30 each 11    traZODone (DESYREL) 50 MG tablet Take 1 tablet (50 mg total) by mouth nightly. 30 tablet 11    venlafaxine (EFFEXOR-XR) 37.5 MG 24 hr capsule Take 1 capsule (37.5 mg total) by mouth once daily. 90 capsule 1    vitamin D 1000 units Tab Take 1,000 Units by mouth once daily.       No current facility-administered medications for this visit.      Allergies: Sulfa (sulfonamide antibiotics)     The 10-year ASCVD risk score ([place] ZIEGLER Jr., et al., 2013) is: 3.9%    Values used to calculate the score:      Age: 53 years      Sex: Female      Is Non- : No      Diabetic: No      Tobacco smoker: No      Systolic Blood Pressure: 138 mmHg      Is BP treated: Yes      HDL Cholesterol: 43 mg/dL      Total Cholesterol: 215 mg/dL      ROS:  Constitutional: no weight loss, weight gain, fever, fatigue  Eyes:  No vision changes, glasses/contacts  ENT/Mouth: No ulcers, sinus problems, ears ringing, headache  Cardiovascular: No inability to lie flat, chest pain, exercise intolerance, swelling, heart palpitations  Respiratory: No wheezing, coughing blood, shortness of breath, or cough  Gastrointestinal: No diarrhea, bloody stool, nausea/vomiting, constipation, gas, hemorrhoids  Genitourinary: No blood in urine, painful urination, urgency of urination, frequency of urination, incomplete emptying, incontinence, abnormal bleeding, painful periods, heavy periods, vaginal discharge, vaginal odor, painful intercourse, sexual problems, bleeding after intercourse.  Musculoskeletal: No muscle weakness, +arthralgias  Skin/Breast: +breast cancer  Neurological: No passing out, seizures, numbness, headache  Endocrine: No diabetes, hypothyroid, hyperthyroid, hot flashes, hair loss, abnormal hair growth, acne  Psychiatric: No depression, crying  Hematologic: No bruises, bleeding, swollen lymph nodes, anemia.      Physical Exam  deferred    ASSESSMENT:    History of breast cancer  Use of aromatase inhibitor  Vaginal atrophy  Dyspareunia  Arthralgias    PLAN:   Face-to-face time 20 min majority spent in counseling and arranging follow-up  Patient continue vaginal moisturizer and lubricant as well as IntraRosa  Continue trazodone for sleep  Counseled her to do a slow weaning off of her Effexor  Follow-up with her oncologist as scheduled  Follow-up with me in July

## 2019-11-07 ENCOUNTER — TELEPHONE (OUTPATIENT)
Dept: HEMATOLOGY/ONCOLOGY | Facility: CLINIC | Age: 54
End: 2019-11-07

## 2019-11-07 ENCOUNTER — TELEPHONE (OUTPATIENT)
Dept: FAMILY MEDICINE | Facility: HOSPITAL | Age: 54
End: 2019-11-07

## 2019-11-07 PROBLEM — Z79.811 USE OF AROMATASE INHIBITORS: Status: ACTIVE | Noted: 2019-11-07

## 2019-11-07 NOTE — PROGRESS NOTES
Subjective:       Patient ID: Natasha Espinal is a 53 y.o. female.    Chief Complaint: No chief complaint on file.    HPI     Mrs. Espinal returns today for follow up.  She has been on anastrazole since March 2018 and so far has tolerated it fairly.  Apparently over the last 2-3 weeks she has been experiencing pain and slight discomfort on the muscles of her left forearm, and she requested to be seen prior to her scheduled visit two weeks from now.  Briefly, she is a 53-year-old  female who was referred for evaluation after undergoing bilateral nipple-sparing mastectomies.   On the left nipple-sparing mastectomy specimen, there was a 3.4 cm tumor.  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  There was no evidence of DCIS or malignancy on the right mastectomy specimen.  A MammaPrint test was sent and suggested that she had a 97.8% chance of disease free survival at five years with hormonal therapy.    Her DXA scan today shows mild osteopenia of the spine and low normal density of the hip.     Review of Systems      Overall she feels OK.  She is still experiencing intermittent joint stiffness, now extending proximally on the left up to her elbow.    ECOG PS remains 1.  She denies any anxiety, depression, easy bruising, fevers, chills, night  sweats, weight loss, nausea, vomiting, diarrhea, constipation, diplopia, blurred vision, headache, chest pain, palpitations, shortness of breath, breast pain, abdominal pain, extremity pain, or difficulty ambulating.  The remainder of the ten-point ROS, including general, skin, lymph, H/N, cardiorespiratory, GI, , Neuro, Endocrine, and psychiatric is negative.       Objective:      Physical Exam        She is alert, oriented to time, place, person, pleasant, well nourished, in no acute physical distress.                                VITAL SIGNS:  Reviewed                                      HEENT:  Normal.  There are no nasal, oral, lip,  gingival, auricular, lid,    or conjunctival lesions.  Mucosae are moist and pink, and there is no        thrush.  Pupils are equal, reactive to light and accommodation.              Extraocular muscle movements are intact.  Dentition is good.  There is no frontal or maxillary tenderness.                                     NECK:  Supple without JVD, adenopathy, or thyromegaly.                       LUNGS:  Clear to auscultation without wheezing, rales, or rhonchi.           CARDIOVASCULAR:  Reveals an S1, S2, no murmurs, no rubs, no gallops.         ABDOMEN:  Soft, nontender, without organomegaly.  Bowel sounds are    present.   Her SHRUTHI flap incision has healed nicely.                                                                 EXTREMITIES:  No cyanosis, clubbing, or edema.                               BREASTS:  She is status post bilateral nipples paring mastectomies.     Her incisions are well healed and no masses are palpated.                               LYMPHATIC:  There is no cervical, axillary, or supraclavicular adenopathy.   SKIN:  Warm and moist, without petechiae, rashes, induration, or ecchymoses.           NEUROLOGIC:  DTRs are 0-1+ bilaterally, symmetrical, motor function is 5/5,  and cranial nerves are  within normal limits.    Assessment:       1. History of breast cancer    2. Use of aromatase inhibitors      3.    Musculoskeletal symptoms secondary to AIs.   Plan:        I had a long discussion with her.  She will remain on anastrazole through the end of March 2023 and see me again in 4 months.   She will take vitamin D and calcium on a daily basis.    Her multiple questions were answered to her satisfaction.

## 2019-11-08 ENCOUNTER — HOSPITAL ENCOUNTER (OUTPATIENT)
Dept: RADIOLOGY | Facility: CLINIC | Age: 54
Discharge: HOME | End: 2019-11-08
Attending: NURSE PRACTITIONER
Payer: COMMERCIAL

## 2019-11-08 ENCOUNTER — OFFICE VISIT (OUTPATIENT)
Dept: HEMATOLOGY/ONCOLOGY | Facility: CLINIC | Age: 54
End: 2019-11-08
Payer: COMMERCIAL

## 2019-11-08 VITALS
HEIGHT: 59 IN | TEMPERATURE: 98 F | RESPIRATION RATE: 16 BRPM | OXYGEN SATURATION: 97 % | WEIGHT: 168.19 LBS | DIASTOLIC BLOOD PRESSURE: 91 MMHG | HEART RATE: 88 BPM | BODY MASS INDEX: 33.91 KG/M2 | SYSTOLIC BLOOD PRESSURE: 140 MMHG

## 2019-11-08 DIAGNOSIS — C50.412 PRIMARY CANCER OF UPPER OUTER QUADRANT OF LEFT BREAST: ICD-10-CM

## 2019-11-08 DIAGNOSIS — Z79.811 USE OF AROMATASE INHIBITORS: ICD-10-CM

## 2019-11-08 DIAGNOSIS — Z85.3 HISTORY OF BREAST CANCER: Primary | ICD-10-CM

## 2019-11-08 DIAGNOSIS — Z79.811 PROPHYLACTIC USE OF ANASTROZOLE (ARIMIDEX): ICD-10-CM

## 2019-11-08 PROCEDURE — 77080 DXA BONE DENSITY AXIAL: CPT | Mod: TC

## 2019-11-08 PROCEDURE — 99213 PR OFFICE/OUTPT VISIT, EST, LEVL III, 20-29 MIN: ICD-10-PCS | Mod: S$GLB,,, | Performed by: INTERNAL MEDICINE

## 2019-11-08 PROCEDURE — 3077F PR MOST RECENT SYSTOLIC BLOOD PRESSURE >= 140 MM HG: ICD-10-PCS | Mod: CPTII,S$GLB,, | Performed by: INTERNAL MEDICINE

## 2019-11-08 PROCEDURE — 99999 PR PBB SHADOW E&M-EST. PATIENT-LVL III: CPT | Mod: PBBFAC,,, | Performed by: INTERNAL MEDICINE

## 2019-11-08 PROCEDURE — 99999 PR PBB SHADOW E&M-EST. PATIENT-LVL III: ICD-10-PCS | Mod: PBBFAC,,, | Performed by: INTERNAL MEDICINE

## 2019-11-08 PROCEDURE — 3080F DIAST BP >= 90 MM HG: CPT | Mod: CPTII,S$GLB,, | Performed by: INTERNAL MEDICINE

## 2019-11-08 PROCEDURE — 3077F SYST BP >= 140 MM HG: CPT | Mod: CPTII,S$GLB,, | Performed by: INTERNAL MEDICINE

## 2019-11-08 PROCEDURE — 3008F BODY MASS INDEX DOCD: CPT | Mod: CPTII,S$GLB,, | Performed by: INTERNAL MEDICINE

## 2019-11-08 PROCEDURE — 3008F PR BODY MASS INDEX (BMI) DOCUMENTED: ICD-10-PCS | Mod: CPTII,S$GLB,, | Performed by: INTERNAL MEDICINE

## 2019-11-08 PROCEDURE — 77080 DXA BONE DENSITY AXIAL: CPT | Mod: 26,,, | Performed by: INTERNAL MEDICINE

## 2019-11-08 PROCEDURE — 3080F PR MOST RECENT DIASTOLIC BLOOD PRESSURE >= 90 MM HG: ICD-10-PCS | Mod: CPTII,S$GLB,, | Performed by: INTERNAL MEDICINE

## 2019-11-08 PROCEDURE — 99213 OFFICE O/P EST LOW 20 MIN: CPT | Mod: S$GLB,,, | Performed by: INTERNAL MEDICINE

## 2019-11-08 PROCEDURE — 77080 DEXA BONE DENSITY SPINE HIP: ICD-10-PCS | Mod: 26,,, | Performed by: INTERNAL MEDICINE

## 2019-11-08 NOTE — LETTER
November 8, 2019      Ta Espinoza MD  [address]  [address]  [facility] [place] [address]           [facility]  [address]  [place] [address]  Phone: [phone]          Patient: Natasha Espinal   MR Number: [id number]   YOB: 1965   Date of Visit: 11/8/2019       Dear Dr. Ta Espinoza:    Thank you for referring Natasha Espinal to me for evaluation. Attached you will find relevant portions of my assessment and plan of care.    If you have questions, please do not hesitate to call me. I look forward to following Natasha Espinal along with you.    Sincerely,    Orlando Olivier MD    Enclosure  CC:  No Recipients    If you would like to receive this communication electronically, please contact [email or web address] or [phone] to request more information on [employer] Link access.    For providers and/or their staff who would like to refer a patient to [facility], please contact us through our one-stop-shop provider referral line, [facility] , at [phone].    If you feel you have received this communication in error or would no longer like to receive these types of communications, please e-mail [email or web address]

## 2019-11-08 NOTE — TELEPHONE ENCOUNTER
Discussed with patient that during a routine audit we discovered a variation in the temperature of our medication refrigerator which may have affected the potency and effectiveness of our vaccines. The vaccine was not harmful but may have been ineffective so we recommend revaccination.  He/She verbalized understanding and all questions were answered. Patient has an appointment at  tomorrow at [facility] and will see if they have it there.

## 2019-11-29 DIAGNOSIS — E78.5 HYPERLIPIDEMIA, UNSPECIFIED HYPERLIPIDEMIA TYPE: ICD-10-CM

## 2019-11-29 RX ORDER — ATORVASTATIN CALCIUM 10 MG/1
TABLET, FILM COATED ORAL
Qty: 90 TABLET | Refills: 0 | OUTPATIENT
Start: 2019-11-29

## 2019-11-30 DIAGNOSIS — Z79.811 USE OF AROMATASE INHIBITORS: ICD-10-CM

## 2019-12-02 RX ORDER — ATORVASTATIN CALCIUM 10 MG/1
10 TABLET, FILM COATED ORAL NIGHTLY
Qty: 90 TABLET | Refills: 3 | Status: SHIPPED | OUTPATIENT
Start: 2019-12-02 | End: 2020-11-10

## 2019-12-02 RX ORDER — ANASTROZOLE 1 MG/1
TABLET ORAL
Qty: 90 TABLET | Refills: 2 | Status: SHIPPED | OUTPATIENT
Start: 2019-12-02 | End: 2020-08-26

## 2020-01-04 ENCOUNTER — DOCUMENTATION ONLY (OUTPATIENT)
Dept: FAMILY MEDICINE | Facility: HOSPITAL | Age: 55
End: 2020-01-04

## 2020-01-22 ENCOUNTER — CLINICAL SUPPORT (OUTPATIENT)
Dept: FAMILY MEDICINE | Facility: CLINIC | Age: 55
End: 2020-01-22
Payer: COMMERCIAL

## 2020-01-22 ENCOUNTER — OFFICE VISIT (OUTPATIENT)
Dept: FAMILY MEDICINE | Facility: CLINIC | Age: 55
End: 2020-01-22
Payer: COMMERCIAL

## 2020-01-22 VITALS
WEIGHT: 173.63 LBS | HEART RATE: 103 BPM | OXYGEN SATURATION: 96 % | SYSTOLIC BLOOD PRESSURE: 126 MMHG | BODY MASS INDEX: 35 KG/M2 | TEMPERATURE: 99 F | DIASTOLIC BLOOD PRESSURE: 84 MMHG | HEIGHT: 59 IN

## 2020-01-22 DIAGNOSIS — I10 ESSENTIAL HYPERTENSION: ICD-10-CM

## 2020-01-22 DIAGNOSIS — Z23 NEED FOR INFLUENZA VACCINATION: Primary | ICD-10-CM

## 2020-01-22 PROCEDURE — 3008F BODY MASS INDEX DOCD: CPT | Mod: CPTII,S$GLB,, | Performed by: INTERNAL MEDICINE

## 2020-01-22 PROCEDURE — 3008F PR BODY MASS INDEX (BMI) DOCUMENTED: ICD-10-PCS | Mod: CPTII,S$GLB,, | Performed by: INTERNAL MEDICINE

## 2020-01-22 PROCEDURE — 90471 IMMUNIZATION ADMIN: CPT | Mod: S$GLB,,, | Performed by: INTERNAL MEDICINE

## 2020-01-22 PROCEDURE — 99999 PR PBB SHADOW E&M-EST. PATIENT-LVL III: CPT | Mod: PBBFAC,,, | Performed by: INTERNAL MEDICINE

## 2020-01-22 PROCEDURE — 99213 PR OFFICE/OUTPT VISIT, EST, LEVL III, 20-29 MIN: ICD-10-PCS | Mod: 25,S$GLB,, | Performed by: INTERNAL MEDICINE

## 2020-01-22 PROCEDURE — 3074F SYST BP LT 130 MM HG: CPT | Mod: CPTII,S$GLB,, | Performed by: INTERNAL MEDICINE

## 2020-01-22 PROCEDURE — 99999 PR PBB SHADOW E&M-EST. PATIENT-LVL I: ICD-10-PCS | Mod: PBBFAC,,,

## 2020-01-22 PROCEDURE — 99999 PR PBB SHADOW E&M-EST. PATIENT-LVL I: CPT | Mod: PBBFAC,,,

## 2020-01-22 PROCEDURE — 90686 IIV4 VACC NO PRSV 0.5 ML IM: CPT | Mod: S$GLB,,, | Performed by: INTERNAL MEDICINE

## 2020-01-22 PROCEDURE — 3074F PR MOST RECENT SYSTOLIC BLOOD PRESSURE < 130 MM HG: ICD-10-PCS | Mod: CPTII,S$GLB,, | Performed by: INTERNAL MEDICINE

## 2020-01-22 PROCEDURE — 3079F DIAST BP 80-89 MM HG: CPT | Mod: CPTII,S$GLB,, | Performed by: INTERNAL MEDICINE

## 2020-01-22 PROCEDURE — 99213 OFFICE O/P EST LOW 20 MIN: CPT | Mod: 25,S$GLB,, | Performed by: INTERNAL MEDICINE

## 2020-01-22 PROCEDURE — 90471 FLU VACCINE (QUAD) GREATER THAN OR EQUAL TO 3YO PRESERVATIVE FREE IM: ICD-10-PCS | Mod: S$GLB,,, | Performed by: INTERNAL MEDICINE

## 2020-01-22 PROCEDURE — 90686 FLU VACCINE (QUAD) GREATER THAN OR EQUAL TO 3YO PRESERVATIVE FREE IM: ICD-10-PCS | Mod: S$GLB,,, | Performed by: INTERNAL MEDICINE

## 2020-01-22 PROCEDURE — 3079F PR MOST RECENT DIASTOLIC BLOOD PRESSURE 80-89 MM HG: ICD-10-PCS | Mod: CPTII,S$GLB,, | Performed by: INTERNAL MEDICINE

## 2020-01-22 PROCEDURE — 99999 PR PBB SHADOW E&M-EST. PATIENT-LVL III: ICD-10-PCS | Mod: PBBFAC,,, | Performed by: INTERNAL MEDICINE

## 2020-01-22 RX ORDER — MELOXICAM 7.5 MG/1
TABLET ORAL
COMMUNITY
Start: 2019-12-16 | End: 2020-11-10

## 2020-01-22 RX ORDER — LOSARTAN POTASSIUM AND HYDROCHLOROTHIAZIDE 12.5; 1 MG/1; MG/1
1 TABLET ORAL DAILY
Qty: 90 TABLET | Refills: 3 | Status: SHIPPED | OUTPATIENT
Start: 2020-01-22 | End: 2020-05-12 | Stop reason: SDUPTHER

## 2020-01-22 NOTE — PROGRESS NOTES
[facility] Note    Chief Complaint      Chief Complaint   Patient presents with    Follow-up     pt here for a 3 month f/u/ would like to discuss med concerns       History of Present Illness      Natasha Espinal is a 54 y.o. female with chronic conditions of HTN, HLD, hx of breast cancer, anxiety, insomnia who presents today for: follow up BP med change.  Has noticed borderline high BP readings since changing lisinopril to losartan and also feels like retaining more fluid.      Also pt had flu vaccine at [facility] and was contacted that the vaccine storage temperature may have resulted in an ineffective vaccine.      Flu shot UTD.  TdAP 2017.  Shingles vaccine due age 59.  Pneumonia vaccine due age 64.  Mammogram N/A bilateral mastectomy.  DEXA 2019, Dr. Boswell.  FIT 6/2019.        Past Medical History:  Past Medical History:   Diagnosis Date    Breast cancer 12/2017    left     Hypertension        Past Surgical History:   has a past surgical history that includes Hysterectomy (2011); Oophorectomy; deviated septum repair; Mastectomy (Bilateral, 02/2018); Breast biopsy (Left, 12/2017); Breast biopsy (Left, 1983); Breast reconstruction (Bilateral); scar tissue removal in airway  (Bilateral, 08/01/2018); and Knee surgery (Left, 01/06/2020).    Family History:  family history includes Breast cancer (age of onset: 43) in her paternal aunt; Breast cancer (age of onset: 55) in her paternal aunt; Breast cancer (age of onset: 62) in her paternal aunt; Colon cancer in her paternal uncle; Heart disease in her father.     Social History:  Social History     Tobacco Use    Smoking status: Never Smoker    Smokeless tobacco: Never Used   Substance Use Topics    Alcohol use: No     Frequency: Monthly or less     Drinks per session: 1 or 2     Binge frequency: Never    Drug use: No       Review of Systems   Constitutional: Negative for chills, fever and malaise/fatigue.   HENT: Negative for hearing  loss.    Eyes: Negative for discharge.   Respiratory: Negative for shortness of breath and wheezing.    Cardiovascular: Negative for chest pain and palpitations.   Gastrointestinal: Negative for blood in stool, constipation, diarrhea, nausea and vomiting.   Genitourinary: Negative for dysuria and hematuria.   Musculoskeletal: Negative for neck pain.   Skin: Negative for rash.   Neurological: Negative for weakness and headaches.   Endo/Heme/Allergies: Negative for polydipsia.        Medications:  Outpatient Encounter Medications as of 1/22/2020   Medication Sig Dispense Refill    amLODIPine (NORVASC) 5 MG tablet Take 1 tablet (5 mg total) by mouth every evening. 90 tablet 1    anastrozole (ARIMIDEX) 1 mg Tab Take 1 mg by mouth.      ascorbate calcium 500 mg Tab Take 1 tablet by mouth.      aspirin (ECOTRIN) 81 MG EC tablet Take 81 mg by mouth once daily.      atorvastatin (LIPITOR) 10 MG tablet Take 1 tablet (10 mg total) by mouth every evening. 90 tablet 3    biotin 10,000 mcg Cap Take by mouth.      cetirizine (ZYRTEC) 10 MG tablet Take 10 mg by mouth once daily.      cholecalciferol, vitamin D3, (VITAMIN D3) 2,000 unit Cap Take 1 capsule by mouth once daily.      fish oil-omega-3 fatty acids 300-1,000 mg capsule Take 1 g by mouth.      melatonin 5 mg Tab Take by mouth.      meloxicam (MOBIC) 7.5 MG tablet       multivitamin/iron/folic acid (CENTRUM WOMEN ORAL) Take by mouth.      prasterone, dhea, (INTRAROSA) 6.5 mg Inst Place 6.5 mg vaginally every evening. 30 each 11    traZODone (DESYREL) 50 MG tablet Take 1 tablet (50 mg total) by mouth nightly. 30 tablet 11    vitamin D 1000 units Tab Take 1,000 Units by mouth once daily.      [DISCONTINUED] losartan-hydrochlorothiazide 50-12.5 mg (HYZAAR) 50-12.5 mg per tablet Take 1 tablet by mouth once daily. 90 tablet 1    anastrozole (ARIMIDEX) 1 mg Tab TAKE 1 TABLET BY MOUTH EVERY DAY (Patient not taking: Reported on 1/22/2020) 90 tablet 2     "losartan-hydrochlorothiazide 100-12.5 mg (HYZAAR) 100-12.5 mg Tab Take 1 tablet by mouth once daily. 90 tablet 3    MULTIVIT-IRON-MIN-FOLIC ACID 3,500-18-0.4 UNIT-MG-MG ORAL CHEW Take by mouth once daily.      venlafaxine (EFFEXOR-XR) 37.5 MG 24 hr capsule Take 1 capsule (37.5 mg total) by mouth once daily. (Patient not taking: Reported on 11/8/2019) 90 capsule 1     No facility-administered encounter medications on file as of 1/22/2020.        Allergies:  Review of patient's allergies indicates:   Allergen Reactions    Naproxen (bulk) Itching    Sulfa (sulfonamide antibiotics) Nausea Only       Health Maintenance:  Immunization History   Administered Date(s) Administered    Influenza - Quadrivalent 11/24/2014    Influenza - Quadrivalent - PF (6 months and older) 08/28/2019, 01/22/2020    Tdap 07/31/2017      Health Maintenance   Topic Date Due    Pneumococcal Vaccine (Highest Risk) (1 of 3 - PCV13) 12/02/1983    Fecal Occult Blood Test (FOBT)/FitKit  06/15/2020    Lipid Panel  12/02/2024    TETANUS VACCINE  07/31/2027    Hepatitis C Screening  Addressed        Physical Exam      Vital Signs  Temp: 98.6 °F (37 °C)  Temp src: Oral  Pulse: 103  SpO2: 96 %  BP: 126/84  BP Location: Right arm  Patient Position: Sitting  Pain Score: 0-No pain  Height and Weight  Height: 4' 11" (149.9 cm)  Weight: 78.8 kg (173 lb 9.8 oz)  BSA (Calculated - sq m): 1.81 sq meters  BMI (Calculated): 35  Weight in (lb) to have BMI = 25: 123.5]    Physical Exam   Constitutional: She appears well-developed and well-nourished.   HENT:   Head: Normocephalic and atraumatic.   Right Ear: External ear normal.   Left Ear: External ear normal.   Mouth/Throat: Oropharynx is clear and moist.   Cardiovascular: Normal rate, regular rhythm and normal heart sounds.   No murmur heard.  Pulmonary/Chest: Effort normal and breath sounds normal. She has no wheezes. She has no rales.   Abdominal: Soft. Bowel sounds are normal. She exhibits no " distension. There is no tenderness.   Vitals reviewed.       Laboratory:  CBC:  Recent Labs   Lab 04/25/18  1042 12/05/18  1343 08/22/19  0830   WBC 5.83 7.18 7.20   RBC 4.86 4.57 4.63   Hemoglobin 13.9 13.2 13.1   Hematocrit 42.1 40.1 40.2   Platelets 304 298 280   Mean Corpuscular Volume 87 88 87   Mean Corpuscular Hemoglobin 28.6 28.9 28.3   Mean Corpuscular Hemoglobin Conc 33.0 32.9 32.6     CMP:  Recent Labs   Lab 08/22/19  0830 09/20/19  0927 12/02/19  0932   Glucose 95 101 99   Calcium 10.3 9.8 10.2   Albumin 4.1 4.6 4.6   Total Protein 7.7 7.7 7.7   Sodium 141 142 146 H   Potassium 3.6 3.8 4.3   CO2 30 H 28 34 H   Chloride 101 105 103   BUN, Bld 18 14 17   Alkaline Phosphatase 95 91 85   ALT 29 36 35   AST 18 28 26   Total Bilirubin 0.5 0.4 0.3     URINALYSIS:  Recent Labs   Lab 09/20/19  0927 10/15/19  0958   Color, UA Yellow Yellow   Specific Gravity, UA 1.020 1.015   pH, UA 8.0 6.0   Protein, UA Negative Negative   Bacteria Occasional Rare   Nitrite, UA Negative Negative   Leukocytes, UA 1+ A Negative   Urobilinogen, UA Negative  --       LIPIDS:  Recent Labs   Lab 08/22/19  0830 12/02/19  0932   HDL 43 42   Cholesterol 215 H 175   Triglycerides 238 H 170 H   LDL Cholesterol 124.4 99.0   Hdl/Cholesterol Ratio 20.0 24.0   Non-HDL Cholesterol 172 133   Total Cholesterol/HDL Ratio 5.0 4.2     TSH:      A1C:  Recent Labs   Lab 08/22/19  0830   Hemoglobin A1C 5.4       Assessment/Plan     Natasha Espinal is a 54 y.o.female with:    1. Essential hypertension  - losartan-hydrochlorothiazide 100-12.5 mg (HYZAAR) 100-12.5 mg Tab; Take 1 tablet by mouth once daily.  Dispense: 90 tablet; Refill: 3  Increasing losartan.  Call if BP readings do not stabilize.      Chronic conditions status updated as per HPI.  Other than changes above, cont current medications and maintain follow up with specialists.  Return to clinic in 6 months.    Ta Espinoza MD  [facility]                Answers for HPI/ROS  submitted by the patient on 1/21/2020   activity change: No  unexpected weight change: No  rhinorrhea: No  trouble swallowing: No  visual disturbance: No  chest tightness: No  polyuria: No  difficulty urinating: No  menstrual problem: No  joint swelling: No  arthralgias: No  confusion: No  dysphoric mood: No

## 2020-01-22 NOTE — PROGRESS NOTES
Pt came in today to be revaccinated with the flu shot. Pt arrived with letter from [place] office stating that she needed to be revaccinated due to various reasons. Pt of Dr. Espinoza. Influenza Quadrivalent administered in Right Deltoid. Pt tolerated procedure well. Pt given V.I.S. Upon arrival.

## 2020-02-29 ENCOUNTER — OFFICE VISIT (OUTPATIENT)
Dept: URGENT CARE | Facility: CLINIC | Age: 55
End: 2020-02-29
Payer: COMMERCIAL

## 2020-02-29 VITALS
OXYGEN SATURATION: 97 % | WEIGHT: 160 LBS | HEART RATE: 100 BPM | RESPIRATION RATE: 20 BRPM | DIASTOLIC BLOOD PRESSURE: 80 MMHG | BODY MASS INDEX: 32.25 KG/M2 | HEIGHT: 59 IN | SYSTOLIC BLOOD PRESSURE: 127 MMHG | TEMPERATURE: 98 F

## 2020-02-29 DIAGNOSIS — R05.9 COUGH: ICD-10-CM

## 2020-02-29 DIAGNOSIS — J34.89 SINUS PRESSURE: ICD-10-CM

## 2020-02-29 DIAGNOSIS — Z87.09 HISTORY OF ASTHMA: ICD-10-CM

## 2020-02-29 DIAGNOSIS — R09.82 POST-NASAL DRIP: ICD-10-CM

## 2020-02-29 DIAGNOSIS — R09.81 NASAL CONGESTION: ICD-10-CM

## 2020-02-29 DIAGNOSIS — J02.9 SORE THROAT: ICD-10-CM

## 2020-02-29 DIAGNOSIS — J20.8 ACUTE BACTERIAL BRONCHITIS: Primary | ICD-10-CM

## 2020-02-29 DIAGNOSIS — B96.89 ACUTE BACTERIAL BRONCHITIS: Primary | ICD-10-CM

## 2020-02-29 LAB
CTP QC/QA: YES
FLUAV AG NPH QL: NEGATIVE
FLUBV AG NPH QL: NEGATIVE

## 2020-02-29 PROCEDURE — 87804 POCT INFLUENZA A/B: ICD-10-PCS | Mod: QW,S$GLB,, | Performed by: NURSE PRACTITIONER

## 2020-02-29 PROCEDURE — 87804 INFLUENZA ASSAY W/OPTIC: CPT | Mod: QW,S$GLB,, | Performed by: NURSE PRACTITIONER

## 2020-02-29 PROCEDURE — 99214 OFFICE O/P EST MOD 30 MIN: CPT | Mod: 25,S$GLB,, | Performed by: NURSE PRACTITIONER

## 2020-02-29 PROCEDURE — 99214 PR OFFICE/OUTPT VISIT, EST, LEVL IV, 30-39 MIN: ICD-10-PCS | Mod: 25,S$GLB,, | Performed by: NURSE PRACTITIONER

## 2020-02-29 RX ORDER — AZITHROMYCIN 250 MG/1
TABLET, FILM COATED ORAL
Qty: 6 TABLET | Refills: 0 | Status: SHIPPED | OUTPATIENT
Start: 2020-02-29 | End: 2020-03-05

## 2020-02-29 RX ORDER — BENZONATATE 100 MG/1
100 CAPSULE ORAL 3 TIMES DAILY PRN
Qty: 21 CAPSULE | Refills: 0 | Status: SHIPPED | OUTPATIENT
Start: 2020-02-29 | End: 2020-03-07

## 2020-02-29 RX ORDER — AZELASTINE 1 MG/ML
1 SPRAY, METERED NASAL 2 TIMES DAILY
Qty: 30 ML | Refills: 0 | Status: SHIPPED | OUTPATIENT
Start: 2020-02-29 | End: 2021-05-11

## 2020-02-29 RX ORDER — ALBUTEROL SULFATE 90 UG/1
2 AEROSOL, METERED RESPIRATORY (INHALATION) EVERY 6 HOURS PRN
Qty: 1 G | Refills: 0 | Status: SHIPPED | OUTPATIENT
Start: 2020-02-29 | End: 2021-06-28 | Stop reason: SDUPTHER

## 2020-02-29 RX ORDER — PROMETHAZINE HYDROCHLORIDE AND DEXTROMETHORPHAN HYDROBROMIDE 6.25; 15 MG/5ML; MG/5ML
5 SYRUP ORAL EVERY 6 HOURS PRN
Qty: 100 ML | Refills: 0 | Status: SHIPPED | OUTPATIENT
Start: 2020-02-29 | End: 2020-03-07

## 2020-02-29 NOTE — PATIENT INSTRUCTIONS
Always follow your healthcare professional's instructions.    You have received urgent care diagnosis and treatment and you may be released before all of your medical problems are known or treated. Unless you have been given a referral, you (the patient), will arrange for follow-up care as instructed.     Please follow up with your primary care provider within 5-7 days if your signs and symptoms have not resolved or have worsen.     If your condition worsens or fails to improve, I recommend that you receive another evaluation in the emergency room immediately or contact your primary care office to discuss your concerns.     You have been diagnosed with ACUTE BACTERIAL BRONCHITIS      Bronchitis is an infection of the air passages (bronchial tubes) in your lungs. It often occurs when you have a cold. For most patients with acute bronchitis, symptoms are self-limited, resolving in about one to three weeks.This illness is contagious during the first few days and is spread through the air by coughing and sneezing, or by direct contact (touching the sick person and then touching your own eyes, nose, or mouth).    Symptoms of bronchitis include cough with mucus (phlegm) and low-grade fever. The average cough lasts about 18 days. Mild cases can be treated with simple home remedies. More severe infection is treated with an antibiotic.    Home care  Follow these guidelines when caring for yourself at home:  · If your symptoms are severe, rest at home for the first 2 to 3 days. When you go back to your usual activities, don't let yourself get too tired.  · Do not smoke. Also avoid being exposed to secondhand smoke.  · You may use over-the-counter medicines to control fever or pain, unless another medicine was prescribed. (Note: If you have chronic liver or kidney disease or have ever had a stomach ulcer or gastrointestinal bleeding, talk with your healthcare provider before using these medicines. Also talk to your provider if  you are taking medicine to prevent blood clots.) Aspirin should never be given to anyone younger than 18 years of age who is ill with a viral infection or fever. It may cause severe liver or brain damage.  · Your appetite may be poor, so a light diet is fine. Avoid dehydration by drinking 6 to 8 glasses of fluids per day (such as water, soft drinks, sports drinks, juices, tea, or soup). Extra fluids will help loosen secretions in the nose and lungs.  · Over-the-counter cough, cold, and sore-throat medicines will not shorten the length of the illness, but they may be helpful to reduce symptoms. (Note: Do not use decongestants if you have high blood pressure.)  · Finish all antibiotic medicine. Do this even if you are feeling better after only a few days.  · Wash your hands well with soap and warm water to help prevent spreading infection.    Follow-up care  Follow up with your healthcare provider, or as advised. If you had an X-ray or ECG (electrocardiogram), a specialist will review it. You will be notified of any new findings that may affect your care.  Note: If you are age 65 or older, or if you have a chronic lung disease or condition that affects your immune system, or you smoke, talk to your healthcare provider about having pneumococcal vaccinations and a yearly influenza vaccination (flu shot).    When to seek medical advice  Call your healthcare provider right away if any of these occur:  · Fever of 100.4°F (38°C) or higher  · Coughing up increased amounts of colored sputum  · Weakness, drowsiness, headache, facial pain, ear pain, or a stiff neck    Call 911, or get immediate medical care  Contact emergency services right away if any of these occur.  · Coughing up blood  · Worsening weakness, drowsiness, headache, or stiff neck  · Trouble breathing, wheezing, or pain with breathing    You have been given an antibiotic to treat your condition today.  Even if your symptoms improve, please complete the  medication as directed on the bottle.     Antibiotics work to destroy bacteria. They may also alter the good bacteria in your gut. Use probiotics and/or high culture yogurt about two hours apart from the antibiotic and about one week after the antibiotic to replace the good bacteria and prevent negative gastro intestinal consequences.    Common antibiotic treatments:  Cefdinir, is a third-generation oral cephalosporin, may cause loose, red stools when administered with products that contain iron. Avoid excessive exposure to sunlight or tanning beds. Use an SPF 30 or higher sunblock when outside and wear protective clothing as azithromycin can make you sunburn more easily.     If you have questions about whether you should take your medications with food, you should read the medication instructions provided to you with your medication, or contact your pharmacy.    If you are female and on BCP use additional methods to prevent pregnancy while on antibiotics and for one cycle after.     Symptom management:    Cough Allergy Symptoms Asthma   Tessalon Perles are a non-narcotic cough medicine. It works by numbing the throat and lungs, making the cough reflex less active, it is used to relieve coughing during the day. If you have been given Phenergan DM cough syrup, you do NOT need to take both medications at the same time.    Phenergan DM is a combination medication that is used to treat cough. Phenergan DM works like an antihistamine and cough suppressant. This medication will make you sleepy like Benadryl, have a drying effect, and act on a part of the brain (cough center) to reduce the need to cough. DO NOT take Benadryl and Phenergan together. You may take over-the-counter claritin, zyrtec, allegra, or xyzal as directed, these are antihistamines that will work to dry up secretions/mucus. Antihistamines work to block the effects of a certain natural substance (histamine), which causes allergy symptoms.    OR    Use  "over-the-counter Flonase (a nasal steroid) or prescribed Azelastine (a nasal antihistamine) to treat runny nose, sneezing, itchy nose, nasal congestion, and postnasal drip.    Proper administration is to "look down at your toes and aim for your nose". The goal is to aim for your nasolabial folds, the creases in your nose. If you feel the medication drip down your throat, you have NOT administered it correctly. If you can "smell the roses" (floral scent), then you have administered it correctly. If you have a history of asthma, expressed a concern about wheezing or have been told you were wheezing during your exam today, you are being given Albuterol. Albuterol is a bronchodilator that relaxes muscles in the airways and increases air flow to the lungs; it is used to treat or prevent bronchospasm, or narrowing of the airways in the lungs.     If you have a history of asthma, expressed a concern about wheezing or have been told you were wheezing during your exam today and are NOT being prescribed Albuterol that is because you have insured me that you have an adequate supply of the drug at home.          Why didn't I get a steroid shot or a medrol dose pack?  It is suggested NOT to use glucocorticoids in the treatment of acute bacterial bronchitis. When given in addition to antibiotics, oral steroids may shorten the time to symptom resolution or improvement (so will the above recommendations). The benefits of steroids are small and, unlike topical glucocorticoids, systemic glucocorticoids possess a significant side effect profile.     Major side effects include:     Effects immunity predisposing you to getting a more severe infection and increases your white blood cell count.Skin consequences: Skin thinning and ecchymoses, Cushingoid appearance (rounded face), acne, weight gain, mild hirsutism, facial erythema, and striae.   Eye consequences: Cataracts, increased intraocular pressure, exophthalmos.    Cardiovascular " consequences: Fluid retention (increase in blood pressure), premature atherosclerotic disease, and arrhythmias.    GI consequences: Increased risk for adverse gastrointestinal effects, such as gastritis, ulcer formation, and gastrointestinal bleeding   Bone and muscle consequences: Osteoporosis, osteonecrosis, and myopathy.   Neuropsychiatric effects: Mood disorders, psychosis, memory impairment, and    Metabolic and Endocrine consequences: Suppress the hypothalamic-pituitary-adrenal (HPA) axis and increase blood sugar.   Young children -- Growth impairment        Can I have a shot instead of pills?  No. I have diagnosed you with acute bacterial bronchitis and I want you to have a FULL course of antibiotics and not just a one time antibiotic shot. I have discussed above why you did not receive a steroid shot-this will only change if you were in respiratory distress      Your provider discussed your plan of care with you during your physical exam. It was reviewed once more by the provider when giving you an after visit summary. If the patient is a minor, the discharge instructions were discussed with an adult and that adult acknowledge their understanding of the provider's teaching.

## 2020-03-03 ENCOUNTER — TELEPHONE (OUTPATIENT)
Dept: URGENT CARE | Facility: CLINIC | Age: 55
End: 2020-03-03

## 2020-03-10 ENCOUNTER — OFFICE VISIT (OUTPATIENT)
Dept: HEMATOLOGY/ONCOLOGY | Facility: CLINIC | Age: 55
End: 2020-03-10
Payer: COMMERCIAL

## 2020-03-10 VITALS
HEIGHT: 59 IN | OXYGEN SATURATION: 98 % | HEART RATE: 91 BPM | TEMPERATURE: 98 F | RESPIRATION RATE: 16 BRPM | WEIGHT: 169.06 LBS | SYSTOLIC BLOOD PRESSURE: 167 MMHG | DIASTOLIC BLOOD PRESSURE: 91 MMHG | BODY MASS INDEX: 34.08 KG/M2

## 2020-03-10 DIAGNOSIS — Z79.811 USE OF AROMATASE INHIBITORS: ICD-10-CM

## 2020-03-10 DIAGNOSIS — Z85.3 HISTORY OF BREAST CANCER: Primary | ICD-10-CM

## 2020-03-10 PROCEDURE — 3080F PR MOST RECENT DIASTOLIC BLOOD PRESSURE >= 90 MM HG: ICD-10-PCS | Mod: CPTII,S$GLB,, | Performed by: INTERNAL MEDICINE

## 2020-03-10 PROCEDURE — 3008F PR BODY MASS INDEX (BMI) DOCUMENTED: ICD-10-PCS | Mod: CPTII,S$GLB,, | Performed by: INTERNAL MEDICINE

## 2020-03-10 PROCEDURE — 3077F SYST BP >= 140 MM HG: CPT | Mod: CPTII,S$GLB,, | Performed by: INTERNAL MEDICINE

## 2020-03-10 PROCEDURE — 3008F BODY MASS INDEX DOCD: CPT | Mod: CPTII,S$GLB,, | Performed by: INTERNAL MEDICINE

## 2020-03-10 PROCEDURE — 3080F DIAST BP >= 90 MM HG: CPT | Mod: CPTII,S$GLB,, | Performed by: INTERNAL MEDICINE

## 2020-03-10 PROCEDURE — 99213 OFFICE O/P EST LOW 20 MIN: CPT | Mod: S$GLB,,, | Performed by: INTERNAL MEDICINE

## 2020-03-10 PROCEDURE — 99999 PR PBB SHADOW E&M-EST. PATIENT-LVL IV: ICD-10-PCS | Mod: PBBFAC,,, | Performed by: INTERNAL MEDICINE

## 2020-03-10 PROCEDURE — 99999 PR PBB SHADOW E&M-EST. PATIENT-LVL IV: CPT | Mod: PBBFAC,,, | Performed by: INTERNAL MEDICINE

## 2020-03-10 PROCEDURE — 99213 PR OFFICE/OUTPT VISIT, EST, LEVL III, 20-29 MIN: ICD-10-PCS | Mod: S$GLB,,, | Performed by: INTERNAL MEDICINE

## 2020-03-10 PROCEDURE — 3077F PR MOST RECENT SYSTOLIC BLOOD PRESSURE >= 140 MM HG: ICD-10-PCS | Mod: CPTII,S$GLB,, | Performed by: INTERNAL MEDICINE

## 2020-03-10 NOTE — PROGRESS NOTES
Subjective:       Patient ID: Natasha Espinal is a 54 y.o. female.    Chief Complaint: No chief complaint on file.    HPI     Mrs. Espinal returns today for follow up.  She has been on anastrazole since March 2018 and so far has tolerated it OK.      Briefly, she is a 54-year-old  female who was referred for evaluation after undergoing bilateral nipple-sparing mastectomies.   On the left nipple-sparing mastectomy specimen, there was a 3.4 cm tumor.  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  There was no evidence of DCIS or malignancy on the right mastectomy specimen.  A MammaPrint test was sent and suggested that she had a 97.8% chance of disease free survival at five years with hormonal therapy.    Her DXA scan in November 2019 had shown mild osteopenia of the spine and low normal density of the hip.     Review of Systems      Overall she feels OK.  She is still experiencing intermittent joint stiffness.    ECOG PS remains 1.  She denies any anxiety, depression, easy bruising, fevers, chills, night  sweats, weight loss, nausea, vomiting, diarrhea, constipation, diplopia, blurred vision, headache, chest pain, palpitations, shortness of breath, breast pain, abdominal pain, extremity pain, or difficulty ambulating.  The remainder of the ten-point ROS, including general, skin, lymph, H/N, cardiorespiratory, GI, , Neuro, Endocrine, and psychiatric is negative.       Objective:      Physical Exam        She is alert, oriented to time, place, person, pleasant, well nourished, in no acute physical distress.                                VITAL SIGNS:  Reviewed                                      HEENT:  Normal.  There are no nasal, oral, lip, gingival, auricular, lid,    or conjunctival lesions.  Mucosae are moist and pink, and there is no        thrush.  Pupils are equal, reactive to light and accommodation.              Extraocular muscle movements are intact.  Dentition is good.   There is no frontal or maxillary tenderness.                                     NECK:  Supple without JVD, adenopathy, or thyromegaly.                       LUNGS:  Clear to auscultation without wheezing, rales, or rhonchi.           CARDIOVASCULAR:  Reveals an S1, S2, no murmurs, no rubs, no gallops.         ABDOMEN:  Soft, nontender, without organomegaly.  Bowel sounds are    present.   Her SHRUTHI flap incision has healed nicely.                                                                 EXTREMITIES:  No cyanosis, clubbing, or edema.                               BREASTS:  She is status post bilateral nipples paring mastectomies.     Her incisions are well healed and no masses are palpated.                               LYMPHATIC:  There is no cervical, axillary, or supraclavicular adenopathy.   SKIN:  Warm and moist, without petechiae, rashes, induration, or ecchymoses.           NEUROLOGIC:  DTRs are 0-1+ bilaterally, symmetrical, motor function is 5/5,  and cranial nerves are  within normal limits.    Assessment:       1. History of breast cancer    2. Use of aromatase inhibitors      3.    Musculoskeletal symptoms secondary to AIs.   Plan:        I had a long discussion with her.  She will remain on anastrazole through the end of March 2023 and see me again in 4 months.   She will take vitamin D and calcium on a daily basis.  I explained to that at the end of the 5 year course we will most likely offer extension to 7 years.  Voiced understanding.    Her multiple questions were answered to her satisfaction.

## 2020-03-16 ENCOUNTER — PATIENT MESSAGE (OUTPATIENT)
Dept: HEMATOLOGY/ONCOLOGY | Facility: CLINIC | Age: 55
End: 2020-03-16

## 2020-04-21 ENCOUNTER — OFFICE VISIT (OUTPATIENT)
Dept: FAMILY MEDICINE | Facility: CLINIC | Age: 55
End: 2020-04-21
Payer: COMMERCIAL

## 2020-04-21 DIAGNOSIS — M25.50 AROMATASE INHIBITOR-ASSOCIATED ARTHRALGIA: ICD-10-CM

## 2020-04-21 DIAGNOSIS — E78.2 MIXED HYPERLIPIDEMIA: Primary | ICD-10-CM

## 2020-04-21 DIAGNOSIS — Z79.811 USE OF AROMATASE INHIBITORS: ICD-10-CM

## 2020-04-21 DIAGNOSIS — Z85.3 HISTORY OF BREAST CANCER: ICD-10-CM

## 2020-04-21 DIAGNOSIS — F41.9 ANXIETY: ICD-10-CM

## 2020-04-21 DIAGNOSIS — T45.1X5A AROMATASE INHIBITOR-ASSOCIATED ARTHRALGIA: ICD-10-CM

## 2020-04-21 DIAGNOSIS — F51.01 PRIMARY INSOMNIA: ICD-10-CM

## 2020-04-21 DIAGNOSIS — I10 ESSENTIAL HYPERTENSION: ICD-10-CM

## 2020-04-21 DIAGNOSIS — E55.9 VITAMIN D DEFICIENCY: ICD-10-CM

## 2020-04-21 PROCEDURE — 99214 OFFICE O/P EST MOD 30 MIN: CPT | Mod: 95,,, | Performed by: INTERNAL MEDICINE

## 2020-04-21 PROCEDURE — 99214 PR OFFICE/OUTPT VISIT, EST, LEVL IV, 30-39 MIN: ICD-10-PCS | Mod: 95,,, | Performed by: INTERNAL MEDICINE

## 2020-04-21 RX ORDER — AMLODIPINE BESYLATE 5 MG/1
5 TABLET ORAL NIGHTLY
Qty: 90 TABLET | Refills: 3 | Status: SHIPPED | OUTPATIENT
Start: 2020-04-21 | End: 2020-12-22

## 2020-04-21 NOTE — PROGRESS NOTES
[facility] Virtual Visit Note    The patient location is: Home  The chief complaint leading to consultation is: follow up chronic conditions  Visit type: Virtual visit with synchronous audio and video  Total time spent with patient: 15 min  Each patient to whom he or she provides medical services by telemedicine is:  (1) informed of the relationship between the physician and patient and the respective role of any other health care provider with respect to management of the patient; and (2) notified that he or she may decline to receive medical services by telemedicine and may withdraw from such care at any time.      Chief Complaint      Chief Complaint   Patient presents with    Follow-up       History of Present Illness      Natasha Espinal is a 54 y.o. female with chronic conditions of HTN, HLD, hx of breast cancer, anxiety, insomnia who presents today for: follow up chronic conditions.  HTN: BP at goal on amlodipine, losartan-hydrochlorothiazide.  Doing better since last visit where we increased losartan-hctz.    HLD: Controlled on atorvastatin.  LDL 99 in 12/2019.  Hx of breast cancer: Sees oncology, Dr. Olivier.  On anastrazole.  Still with myalgias with anastrozole.  No new changes.  Anxiety: Controlled off effexor.  No new or worsening symptoms.    Insomnia: Controlled on trazodone.  Doing well on this regimen.   Flu shot UTD.  TdAP 2017.  Shingles vaccine due age 59.  Pneumonia vaccine due age 64.  Mammogram N/A bilateral mastectomy.  [facility] 2019, Dr. Boswell.  FIT 6/2019.    Past Medical History:  Past Medical History:   Diagnosis Date    Breast cancer 12/2017    left     Hypertension        Past Surgical History:   has a past surgical history that includes Hysterectomy (2011); Oophorectomy; deviated septum repair; Mastectomy (Bilateral, 02/2018); Breast biopsy (Left, 12/2017); Breast biopsy (Left, 1983); Breast reconstruction (Bilateral); scar tissue removal in airway  (Bilateral,  08/01/2018); and Knee surgery (Left, 01/06/2020).    Family History:  family history includes Breast cancer (age of onset: 43) in her paternal aunt; Breast cancer (age of onset: 55) in her paternal aunt; Breast cancer (age of onset: 62) in her paternal aunt; Colon cancer in her paternal uncle; Heart disease in her father.     Social History:  Social History     Tobacco Use    Smoking status: Never Smoker    Smokeless tobacco: Never Used   Substance Use Topics    Alcohol use: No     Frequency: Monthly or less     Drinks per session: 1 or 2     Binge frequency: Never    Drug use: No       Review of Systems   Constitutional: Negative for chills, fever and malaise/fatigue.   HENT: Negative for hearing loss.    Eyes: Negative for discharge.   Respiratory: Negative for shortness of breath and wheezing.    Cardiovascular: Negative for chest pain and palpitations.   Gastrointestinal: Negative for blood in stool, constipation, diarrhea, nausea and vomiting.   Genitourinary: Negative for dysuria and hematuria.   Musculoskeletal: Negative for neck pain.   Skin: Negative for rash.   Neurological: Negative for weakness and headaches.   Endo/Heme/Allergies: Negative for polydipsia.        Medications:  Outpatient Encounter Medications as of 4/21/2020   Medication Sig Dispense Refill    amLODIPine (NORVASC) 5 MG tablet Take 1 tablet (5 mg total) by mouth every evening. 90 tablet 3    anastrozole (ARIMIDEX) 1 mg Tab Take 1 mg by mouth.      atorvastatin (LIPITOR) 10 MG tablet Take 1 tablet (10 mg total) by mouth every evening. 90 tablet 3    losartan-hydrochlorothiazide 100-12.5 mg (HYZAAR) 100-12.5 mg Tab Take 1 tablet by mouth once daily. 90 tablet 3    traZODone (DESYREL) 50 MG tablet Take 1 tablet (50 mg total) by mouth nightly. 30 tablet 11    vitamin D 1000 units Tab Take 1,000 Units by mouth once daily.      albuterol (PROVENTIL HFA) 90 mcg/actuation inhaler Inhale 2 puffs into the lungs every 6 (six) hours as  needed for Wheezing. Rescue 1 g 0    anastrozole (ARIMIDEX) 1 mg Tab TAKE 1 TABLET BY MOUTH EVERY DAY 90 tablet 2    ascorbate calcium 500 mg Tab Take 1 tablet by mouth.      aspirin (ECOTRIN) 81 MG EC tablet Take 81 mg by mouth once daily.      azelastine (ASTELIN) 137 mcg (0.1 %) nasal spray 1 spray (137 mcg total) by Nasal route 2 (two) times daily. for 14 days 30 mL 0    biotin 10,000 mcg Cap Take by mouth.      cetirizine (ZYRTEC) 10 MG tablet Take 10 mg by mouth once daily.      cholecalciferol, vitamin D3, (VITAMIN D3) 2,000 unit Cap Take 1 capsule by mouth once daily.      fish oil-omega-3 fatty acids 300-1,000 mg capsule Take 1 g by mouth.      melatonin 5 mg Tab Take by mouth.      meloxicam (MOBIC) 7.5 MG tablet       MULTIVIT-IRON-MIN-FOLIC ACID 3,500-18-0.4 UNIT-MG-MG ORAL CHEW Take by mouth once daily.      multivitamin/iron/folic acid (CENTRUM WOMEN ORAL) Take by mouth.      prasterone, dhea, (INTRAROSA) 6.5 mg Inst Place 6.5 mg vaginally every evening. 30 each 11    [DISCONTINUED] amLODIPine (NORVASC) 5 MG tablet Take 1 tablet (5 mg total) by mouth every evening. 90 tablet 1    [DISCONTINUED] venlafaxine (EFFEXOR-XR) 37.5 MG 24 hr capsule Take 1 capsule (37.5 mg total) by mouth once daily. 90 capsule 1     No facility-administered encounter medications on file as of 4/21/2020.        Allergies:  Review of patient's allergies indicates:   Allergen Reactions    Naproxen (bulk) Itching    Naproxen Itching    Sulfa (sulfonamide antibiotics) Nausea Only       Health Maintenance:  Immunization History   Administered Date(s) Administered    Influenza - Quadrivalent 11/24/2014    Influenza - Quadrivalent - PF (6 months and older) 08/28/2019, 01/22/2020    Tdap 07/31/2017      Health Maintenance   Topic Date Due    Pneumococcal Vaccine (Highest Risk) (1 of 3 - PCV13) 12/02/1983    Fecal Occult Blood Test (FOBT)/FitKit  06/15/2020    Lipid Panel  12/02/2024    TETANUS VACCINE   07/31/2027    Hepatitis C Screening  Addressed        Physical Exam       ]    Physical Exam   Constitutional: She is oriented to person, place, and time. She appears well-developed and well-nourished. No distress.   Eyes: Conjunctivae and EOM are normal. Right eye exhibits no discharge. Left eye exhibits no discharge. No scleral icterus.   Pulmonary/Chest: Effort normal. No respiratory distress.   Neurological: She is alert and oriented to person, place, and time.   Skin: She is not diaphoretic.   Psychiatric: She has a normal mood and affect. Her behavior is normal. Judgment and thought content normal.        Laboratory:  CBC:  Recent Labs   Lab 04/25/18  1042 12/05/18  1343 08/22/19  0830   WBC 5.83 7.18 7.20   RBC 4.86 4.57 4.63   Hemoglobin 13.9 13.2 13.1   Hematocrit 42.1 40.1 40.2   Platelets 304 298 280   Mean Corpuscular Volume 87 88 87   Mean Corpuscular Hemoglobin 28.6 28.9 28.3   Mean Corpuscular Hemoglobin Conc 33.0 32.9 32.6     CMP:  Recent Labs   Lab 08/22/19  0830 09/20/19  0927 12/02/19  0932   Glucose 95 101 99   Calcium 10.3 9.8 10.2   Albumin 4.1 4.6 4.6   Total Protein 7.7 7.7 7.7   Sodium 141 142 146 H   Potassium 3.6 3.8 4.3   CO2 30 H 28 34 H   Chloride 101 105 103   BUN, Bld 18 14 17   Alkaline Phosphatase 95 91 85   ALT 29 36 35   AST 18 28 26   Total Bilirubin 0.5 0.4 0.3     URINALYSIS:  Recent Labs   Lab 09/20/19  0927 10/15/19  0958   Color, UA Yellow Yellow   Specific Gravity, UA 1.020 1.015   pH, UA 8.0 6.0   Protein, UA Negative Negative   Bacteria Occasional Rare   Nitrite, UA Negative Negative   Leukocytes, UA 1+ A Negative   Urobilinogen, UA Negative  --       LIPIDS:  Recent Labs   Lab 08/22/19  0830 12/02/19  0932   HDL 43 42   Cholesterol 215 H 175   Triglycerides 238 H 170 H   LDL Cholesterol 124.4 99.0   Hdl/Cholesterol Ratio 20.0 24.0   Non-HDL Cholesterol 172 133   Total Cholesterol/HDL Ratio 5.0 4.2     TSH:      A1C:  Recent Labs   Lab 08/22/19  0830   Hemoglobin A1C  5.4       Assessment/Plan     Natasha Espianl is a 54 y.o.female with:    1. Essential hypertension  - amLODIPine (NORVASC) 5 MG tablet; Take 1 tablet (5 mg total) by mouth every evening.  Dispense: 90 tablet; Refill: 3  Continue current meds.    2. Mixed hyperlipidemia  Continue current meds.  Labs due next visit  3. History of breast cancer  Continue current meds.  F/U with Dr. Boswell as scheduled  4. Anxiety  Doing well off meds.  Monitor for any changes and call if worsening  5. Primary insomnia  Continue current meds.    6. Use of aromatase inhibitors  Continue current meds.    7. Aromatase inhibitor-associated arthralgia  Continue current meds.  As long as symptoms tolerable, will continue this regimen for the 5-10 yr recommended duration.  8. Vitamin D deficiency  Continue current meds.      Chronic conditions status updated as per HPI.  Other than changes above, cont current medications and maintain follow up with specialists.  Return to clinic in 6 months.    Ta Espinoza MD  [facility]                Answers for HPI/ROS submitted by the patient on 4/16/2020   activity change: No  unexpected weight change: No  rhinorrhea: No  trouble swallowing: No  visual disturbance: No  chest tightness: No  polyuria: No  difficulty urinating: No  menstrual problem: No  joint swelling: No  arthralgias: No  confusion: No  dysphoric mood: No

## 2020-05-11 ENCOUNTER — PATIENT MESSAGE (OUTPATIENT)
Dept: FAMILY MEDICINE | Facility: CLINIC | Age: 55
End: 2020-05-11

## 2020-05-12 DIAGNOSIS — I10 ESSENTIAL HYPERTENSION: ICD-10-CM

## 2020-05-12 RX ORDER — LOSARTAN POTASSIUM AND HYDROCHLOROTHIAZIDE 12.5; 1 MG/1; MG/1
1 TABLET ORAL DAILY
Qty: 90 TABLET | Refills: 3 | Status: SHIPPED | OUTPATIENT
Start: 2020-05-12 | End: 2020-11-24

## 2020-07-09 PROBLEM — C50.611 CARCINOMA OF AXILLARY TAIL OF RIGHT BREAST IN FEMALE, ESTROGEN RECEPTOR POSITIVE: Status: ACTIVE | Noted: 2020-07-09

## 2020-07-09 PROBLEM — Z17.0 CARCINOMA OF AXILLARY TAIL OF RIGHT BREAST IN FEMALE, ESTROGEN RECEPTOR POSITIVE: Status: ACTIVE | Noted: 2020-07-09

## 2020-07-09 NOTE — PROGRESS NOTES
Subjective:       Patient ID: Natasha Espinal is a 54 y.o. female.    Chief Complaint: No chief complaint on file.    HPI     Mrs. Espinal returns today for follow up.  She has been on anastrazole since March 2018 and so far has tolerated it OK.      Briefly, she is a 54-year-old  female who was referred for evaluation after undergoing bilateral nipple-sparing mastectomies.   On the left nipple-sparing mastectomy specimen, there was a 3.4 cm tumor.  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  There was no evidence of DCIS or malignancy on the right mastectomy specimen.  A MammaPrint test was sent and suggested that she had a 97.8% chance of disease free survival at five years with hormonal therapy.      Her DXA scan in November 2019 had shown mild osteopenia of the spine and low normal density of the hip.     Review of Systems      Overall she feels OK.  She is still experiencing intermittent joint stiffness.   ECOG PS remains 1.  She denies any anxiety, depression, easy bruising, fevers, chills, night  sweats, weight loss, nausea, vomiting, diarrhea, constipation, diplopia, blurred vision, headache, chest pain, palpitations, shortness of breath, breast pain, abdominal pain, extremity pain, or difficulty ambulating.  The remainder of the ten-point ROS, including general, skin, lymph, H/N, cardiorespiratory, GI, , Neuro, Endocrine, and psychiatric is negative.       Objective:      Physical Exam        She is alert, oriented to time, place, person, pleasant, well nourished, in no acute physical distress.                                VITAL SIGNS:  Reviewed                                      HEENT:  Normal.  There are no nasal, oral, lip, gingival, auricular, lid,    or conjunctival lesions.  Mucosae are moist and pink, and there is no        thrush.  Pupils are equal, reactive to light and accommodation.              Extraocular muscle movements are intact.  Dentition is good.   There is no frontal or maxillary tenderness.                                     NECK:  Supple without JVD, adenopathy, or thyromegaly.                       LUNGS:  Clear to auscultation without wheezing, rales, or rhonchi.           CARDIOVASCULAR:  Reveals an S1, S2, no murmurs, no rubs, no gallops.         ABDOMEN:  Soft, nontender, without organomegaly.  Bowel sounds are    present.   Her SHRUTHI flap incision has healed nicely.                                                                 EXTREMITIES:  No cyanosis, clubbing, or edema.  There is of 4 mm palpable nodule on the dorsal surface of the middle interphalangeal joint.                               BREASTS:  She is status post bilateral nipples paring mastectomies.     Her incisions are well healed and no masses are palpated.                               LYMPHATIC:  There is no cervical, axillary, or supraclavicular adenopathy.   SKIN:  Warm and moist, without petechiae, rashes, induration, or ecchymoses.           NEUROLOGIC:  DTRs are 0-1+ bilaterally, symmetrical, motor function is 5/5,  and cranial nerves are  within normal limits.    Assessment:       1. Use of aromatase inhibitors    2. Carcinoma of axillary tail of left breast in female, estrogen receptor positive      3.    Musculoskeletal symptoms secondary to AIs.   Plan:        I had a long discussion with her.  She will remain on anastrazole through the end of March 2023 and see me again in 4 months.   She will take vitamin D and calcium on a daily basis.  We will repeat her DXA scan in November 2021.  I again explained to her that my preference would be to extend her treatment to 7 years.  She voiced understanding.    Her multiple questions were answered to her satisfaction.

## 2020-07-10 ENCOUNTER — OFFICE VISIT (OUTPATIENT)
Dept: HEMATOLOGY/ONCOLOGY | Facility: CLINIC | Age: 55
End: 2020-07-10
Payer: COMMERCIAL

## 2020-07-10 VITALS
WEIGHT: 170 LBS | SYSTOLIC BLOOD PRESSURE: 133 MMHG | HEART RATE: 94 BPM | BODY MASS INDEX: 34.27 KG/M2 | DIASTOLIC BLOOD PRESSURE: 77 MMHG | HEIGHT: 59 IN | RESPIRATION RATE: 18 BRPM | OXYGEN SATURATION: 96 %

## 2020-07-10 DIAGNOSIS — Z17.0 CARCINOMA OF AXILLARY TAIL OF LEFT BREAST IN FEMALE, ESTROGEN RECEPTOR POSITIVE: ICD-10-CM

## 2020-07-10 DIAGNOSIS — Z79.811 USE OF AROMATASE INHIBITORS: Primary | ICD-10-CM

## 2020-07-10 DIAGNOSIS — C50.612 CARCINOMA OF AXILLARY TAIL OF LEFT BREAST IN FEMALE, ESTROGEN RECEPTOR POSITIVE: ICD-10-CM

## 2020-07-10 PROCEDURE — 3075F SYST BP GE 130 - 139MM HG: CPT | Mod: CPTII,S$GLB,, | Performed by: INTERNAL MEDICINE

## 2020-07-10 PROCEDURE — 99213 OFFICE O/P EST LOW 20 MIN: CPT | Mod: S$GLB,,, | Performed by: INTERNAL MEDICINE

## 2020-07-10 PROCEDURE — 3008F BODY MASS INDEX DOCD: CPT | Mod: CPTII,S$GLB,, | Performed by: INTERNAL MEDICINE

## 2020-07-10 PROCEDURE — 99213 PR OFFICE/OUTPT VISIT, EST, LEVL III, 20-29 MIN: ICD-10-PCS | Mod: S$GLB,,, | Performed by: INTERNAL MEDICINE

## 2020-07-10 PROCEDURE — 99999 PR PBB SHADOW E&M-EST. PATIENT-LVL IV: ICD-10-PCS | Mod: PBBFAC,,, | Performed by: INTERNAL MEDICINE

## 2020-07-10 PROCEDURE — 3075F PR MOST RECENT SYSTOLIC BLOOD PRESS GE 130-139MM HG: ICD-10-PCS | Mod: CPTII,S$GLB,, | Performed by: INTERNAL MEDICINE

## 2020-07-10 PROCEDURE — 99999 PR PBB SHADOW E&M-EST. PATIENT-LVL IV: CPT | Mod: PBBFAC,,, | Performed by: INTERNAL MEDICINE

## 2020-07-10 PROCEDURE — 3078F PR MOST RECENT DIASTOLIC BLOOD PRESSURE < 80 MM HG: ICD-10-PCS | Mod: CPTII,S$GLB,, | Performed by: INTERNAL MEDICINE

## 2020-07-10 PROCEDURE — 3008F PR BODY MASS INDEX (BMI) DOCUMENTED: ICD-10-PCS | Mod: CPTII,S$GLB,, | Performed by: INTERNAL MEDICINE

## 2020-07-10 PROCEDURE — 3078F DIAST BP <80 MM HG: CPT | Mod: CPTII,S$GLB,, | Performed by: INTERNAL MEDICINE

## 2020-07-22 DIAGNOSIS — N95.2 VAGINAL ATROPHY: Primary | ICD-10-CM

## 2020-07-22 RX ORDER — PRASTERONE 6.5 MG/1
6.5 INSERT VAGINAL NIGHTLY
Qty: 30 EACH | Refills: 3 | Status: SHIPPED | OUTPATIENT
Start: 2020-07-22 | End: 2023-11-13

## 2020-08-11 NOTE — TELEPHONE ENCOUNTER
Faxed received from [facility] for refill on trazodone. Pt due for appointment in October

## 2020-08-12 RX ORDER — TRAZODONE HYDROCHLORIDE 50 MG/1
50 TABLET ORAL NIGHTLY
Qty: 30 TABLET | Refills: 2 | Status: SHIPPED | OUTPATIENT
Start: 2020-08-12 | End: 2020-11-05

## 2020-09-11 ENCOUNTER — PATIENT OUTREACH (OUTPATIENT)
Dept: ADMINISTRATIVE | Facility: HOSPITAL | Age: 55
End: 2020-09-11

## 2020-09-12 NOTE — PROGRESS NOTES
Pankaj reviewed. Care Everywhere reviewed.   Chart scrubbed for Colonoscopy report.      THADDEUS

## 2020-10-06 ENCOUNTER — TELEPHONE (OUTPATIENT)
Dept: FAMILY MEDICINE | Facility: CLINIC | Age: 55
End: 2020-10-06

## 2020-10-06 DIAGNOSIS — Z11.4 SCREENING FOR HIV (HUMAN IMMUNODEFICIENCY VIRUS): ICD-10-CM

## 2020-10-06 DIAGNOSIS — Z12.11 SCREEN FOR COLON CANCER: Primary | ICD-10-CM

## 2020-10-06 DIAGNOSIS — Z00.00 ANNUAL PHYSICAL EXAM: ICD-10-CM

## 2020-10-06 DIAGNOSIS — Z11.59 SCREENING FOR VIRAL DISEASE: ICD-10-CM

## 2020-10-07 NOTE — TELEPHONE ENCOUNTER
Informed pt regarding stool test. Pt verbalized understanding.       Pt has appt scheduled on 10/29/2020. Pt would like to have lab orders put to have done before appt. Please advise.

## 2020-10-15 ENCOUNTER — LAB VISIT (OUTPATIENT)
Dept: LAB | Facility: HOSPITAL | Age: 55
End: 2020-10-15
Attending: INTERNAL MEDICINE
Payer: COMMERCIAL

## 2020-10-15 ENCOUNTER — PATIENT OUTREACH (OUTPATIENT)
Dept: ADMINISTRATIVE | Facility: HOSPITAL | Age: 55
End: 2020-10-15

## 2020-10-15 DIAGNOSIS — Z12.11 SCREEN FOR COLON CANCER: ICD-10-CM

## 2020-10-15 PROCEDURE — 82274 ASSAY TEST FOR BLOOD FECAL: CPT

## 2020-10-23 LAB — HEMOCCULT STL QL IA: NEGATIVE

## 2020-11-05 ENCOUNTER — TELEPHONE (OUTPATIENT)
Dept: OBSTETRICS AND GYNECOLOGY | Facility: CLINIC | Age: 55
End: 2020-11-05
Payer: COMMERCIAL

## 2020-11-05 RX ORDER — TRAZODONE HYDROCHLORIDE 50 MG/1
50 TABLET ORAL NIGHTLY
Qty: 90 TABLET | Refills: 0 | Status: SHIPPED | OUTPATIENT
Start: 2020-11-05 | End: 2020-11-10

## 2020-11-05 RX ORDER — TRAZODONE HYDROCHLORIDE 50 MG/1
TABLET ORAL
Qty: 90 TABLET | Refills: 0 | Status: SHIPPED | OUTPATIENT
Start: 2020-11-05 | End: 2021-05-16

## 2020-11-10 ENCOUNTER — OFFICE VISIT (OUTPATIENT)
Dept: FAMILY MEDICINE | Facility: CLINIC | Age: 55
End: 2020-11-10
Payer: COMMERCIAL

## 2020-11-10 VITALS
HEART RATE: 100 BPM | HEIGHT: 59 IN | SYSTOLIC BLOOD PRESSURE: 120 MMHG | BODY MASS INDEX: 33.6 KG/M2 | OXYGEN SATURATION: 98 % | DIASTOLIC BLOOD PRESSURE: 90 MMHG | WEIGHT: 166.69 LBS | TEMPERATURE: 98 F

## 2020-11-10 DIAGNOSIS — I10 ESSENTIAL HYPERTENSION: Primary | ICD-10-CM

## 2020-11-10 DIAGNOSIS — E78.2 MIXED HYPERLIPIDEMIA: ICD-10-CM

## 2020-11-10 DIAGNOSIS — Z85.3 HISTORY OF BREAST CANCER: ICD-10-CM

## 2020-11-10 DIAGNOSIS — F41.9 ANXIETY: ICD-10-CM

## 2020-11-10 DIAGNOSIS — F51.01 PRIMARY INSOMNIA: ICD-10-CM

## 2020-11-10 DIAGNOSIS — Z00.00 ANNUAL PHYSICAL EXAM: ICD-10-CM

## 2020-11-10 PROCEDURE — 3074F PR MOST RECENT SYSTOLIC BLOOD PRESSURE < 130 MM HG: ICD-10-PCS | Mod: CPTII,S$GLB,, | Performed by: INTERNAL MEDICINE

## 2020-11-10 PROCEDURE — 99999 PR PBB SHADOW E&M-EST. PATIENT-LVL III: ICD-10-PCS | Mod: PBBFAC,,, | Performed by: INTERNAL MEDICINE

## 2020-11-10 PROCEDURE — 3074F SYST BP LT 130 MM HG: CPT | Mod: CPTII,S$GLB,, | Performed by: INTERNAL MEDICINE

## 2020-11-10 PROCEDURE — 99396 PREV VISIT EST AGE 40-64: CPT | Mod: S$GLB,,, | Performed by: INTERNAL MEDICINE

## 2020-11-10 PROCEDURE — 99396 PR PREVENTIVE VISIT,EST,40-64: ICD-10-PCS | Mod: S$GLB,,, | Performed by: INTERNAL MEDICINE

## 2020-11-10 PROCEDURE — 3008F BODY MASS INDEX DOCD: CPT | Mod: CPTII,S$GLB,, | Performed by: INTERNAL MEDICINE

## 2020-11-10 PROCEDURE — 3008F PR BODY MASS INDEX (BMI) DOCUMENTED: ICD-10-PCS | Mod: CPTII,S$GLB,, | Performed by: INTERNAL MEDICINE

## 2020-11-10 PROCEDURE — 3080F DIAST BP >= 90 MM HG: CPT | Mod: CPTII,S$GLB,, | Performed by: INTERNAL MEDICINE

## 2020-11-10 PROCEDURE — 3080F PR MOST RECENT DIASTOLIC BLOOD PRESSURE >= 90 MM HG: ICD-10-PCS | Mod: CPTII,S$GLB,, | Performed by: INTERNAL MEDICINE

## 2020-11-10 PROCEDURE — 99999 PR PBB SHADOW E&M-EST. PATIENT-LVL III: CPT | Mod: PBBFAC,,, | Performed by: INTERNAL MEDICINE

## 2020-11-10 NOTE — PROGRESS NOTES
[facility] Note    Chief Complaint      Chief Complaint   Patient presents with    Hyperlipidemia     6 month f/u    Hypertension       History of Present Illness      Natasha Espinal is a 54 y.o. female with chronic conditions of HTN, HLD, hx of breast cancer, asthma, anxiety, insomnia who presents today for: annual preventative visit.  HTN: BP at goal on amlodipine, losartan-hydrochlorothiazide.    HLD: Controlled off atorvastatin.    The 10-year ASCVD risk score (Granger DC Jr., et al., 2013) is: 3.2%    Values used to calculate the score:      Age: 54 years      Sex: Female      Is Non- : No      Diabetic: No      Tobacco smoker: No      Systolic Blood Pressure: 120 mmHg      Is BP treated: Yes      HDL Cholesterol: 38 mg/dL      Total Cholesterol: 190 mg/dL   Hx of breast cancer: Sees oncology, Dr. Olivier.  On anastrazole.  Still with myalgias with anastrozole.  No new changes.  Asthma: currently wheezing.  Has albuterol as needed.  Anxiety: Controlled off effexor.  No new or worsening symptoms.    Insomnia: Partially controlled on trazodone.  Doing well on this regimen.   Diet: Prepares own food mostly.  Limiting fatty foods and carbs.  Drinks plenty water.  Exercise: Water aerobics 3-4x/week prior to covid.  Walks infrequently recently.  Denies drinking and driving, drinking more than 4 drinks on occasion, drug use.     Flu shot UTD.  TdAP 2017.  Shingles vaccine discussed.  Pneumonia vaccine due age 64.  Mammogram N/A bilateral mastectomy.  DEXA 2019, Dr. Boswell.  FIT 10/2020.    Past Medical History:  Past Medical History:   Diagnosis Date    Breast cancer 12/2017    left     Hypertension        Past Surgical History:   has a past surgical history that includes Hysterectomy (2011); Oophorectomy; deviated septum repair; Mastectomy (Bilateral, 02/2018); Breast biopsy (Left, 12/2017); Breast biopsy (Left, 1983); Breast reconstruction (Bilateral); scar  tissue removal in airway  (Bilateral, 08/01/2018); and Knee surgery (Left, 01/06/2020).    Family History:  family history includes Asthma in her brother and mother; Breast cancer (age of onset: 43) in her paternal aunt; Breast cancer (age of onset: 55) in her paternal aunt; Breast cancer (age of onset: 62) in her paternal aunt; COPD in her father; Colon cancer in her paternal uncle; Heart disease in her father.     Social History:  Social History     Tobacco Use    Smoking status: Never Smoker    Smokeless tobacco: Never Used   Substance Use Topics    Alcohol use: No     Frequency: Monthly or less     Drinks per session: 1 or 2     Binge frequency: Never    Drug use: No       I personally reviewed all past medical, surgical, social and family history.    Review of Systems   Constitutional: Negative for chills, fever and malaise/fatigue.   Respiratory: Negative for shortness of breath.    Cardiovascular: Negative for chest pain.   Gastrointestinal: Negative for constipation, diarrhea, nausea and vomiting.   Skin: Negative for rash.   Neurological: Negative for weakness.   All other systems reviewed and are negative.       Medications:  Outpatient Encounter Medications as of 11/10/2020   Medication Sig Dispense Refill    amLODIPine (NORVASC) 5 MG tablet Take 1 tablet (5 mg total) by mouth every evening. 90 tablet 3    anastrozole (ARIMIDEX) 1 mg Tab TAKE 1 TABLET BY MOUTH EVERY DAY 90 tablet 2    ascorbate calcium 500 mg Tab Take 1 tablet by mouth.      aspirin (ECOTRIN) 81 MG EC tablet Take 81 mg by mouth once daily.      azelastine (ASTELIN) 137 mcg (0.1 %) nasal spray 1 spray (137 mcg total) by Nasal route 2 (two) times daily. for 14 days 30 mL 0    cetirizine (ZYRTEC) 10 MG tablet Take 10 mg by mouth once daily.      cholecalciferol, vitamin D3, (VITAMIN D3) 2,000 unit Cap Take 1 capsule by mouth once daily.      fish oil-omega-3 fatty acids 300-1,000 mg capsule Take 1 g by mouth.       losartan-hydrochlorothiazide 100-12.5 mg (HYZAAR) 100-12.5 mg Tab Take 1 tablet by mouth once daily. 90 tablet 3    melatonin 5 mg Tab Take by mouth.      MULTIVIT-IRON-MIN-FOLIC ACID 3,500-18-0.4 UNIT-MG-MG ORAL CHEW Take by mouth once daily.      multivitamin/iron/folic acid (CENTRUM WOMEN ORAL) Take by mouth.      prasterone, dhea, (INTRAROSA) 6.5 mg Inst Place 6.5 mg vaginally every evening. 30 each 3    traZODone (DESYREL) 50 MG tablet TAKE 1 TABLET BY MOUTH EVERY DAY AT NIGHT 90 tablet 0    albuterol (PROVENTIL HFA) 90 mcg/actuation inhaler Inhale 2 puffs into the lungs every 6 (six) hours as needed for Wheezing. Rescue 1 g 0    [DISCONTINUED] anastrozole (ARIMIDEX) 1 mg Tab Take 1 mg by mouth.      [DISCONTINUED] atorvastatin (LIPITOR) 10 MG tablet Take 1 tablet (10 mg total) by mouth every evening. (Patient not taking: Reported on 11/10/2020) 90 tablet 3    [DISCONTINUED] biotin 10,000 mcg Cap Take by mouth.      [DISCONTINUED] meloxicam (MOBIC) 7.5 MG tablet       [DISCONTINUED] traZODone (DESYREL) 50 MG tablet Take 1 tablet (50 mg total) by mouth nightly. (Patient not taking: Reported on 11/10/2020) 90 tablet 0    [DISCONTINUED] vitamin D 1000 units Tab Take 1,000 Units by mouth once daily.       No facility-administered encounter medications on file as of 11/10/2020.        Allergies:  Review of patient's allergies indicates:   Allergen Reactions    Naproxen (bulk) Itching    Naproxen Itching    Sulfa (sulfonamide antibiotics) Nausea Only       Health Maintenance:  Immunization History   Administered Date(s) Administered    Influenza (FLUBLOK) - Quadrivalent - Recombinant - PF *Preferred* (egg allergy) 10/05/2020    Influenza - Quadrivalent 11/24/2014    Influenza - Quadrivalent - PF *Preferred* (6 months and older) 08/28/2019, 01/22/2020    Tdap 07/31/2017      Health Maintenance   Topic Date Due    Pneumococcal Vaccine (Highest Risk) (1 of 3 - PCV13) 12/02/1983    Lipid Panel   "10/26/2025    TETANUS VACCINE  07/31/2027    Hepatitis C Screening  Addressed        Physical Exam      Vital Signs  Temp: 97.9 °F (36.6 °C)  Temp src: Oral  Pulse: 100  SpO2: 98 %  BP: (!) 120/90  BP Location: Right arm  Patient Position: Sitting  Pain Score: 0-No pain  Height and Weight  Height: 4' 11" (149.9 cm)  Weight: 75.6 kg (166 lb 10.7 oz)  BSA (Calculated - sq m): 1.77 sq meters  BMI (Calculated): 33.6  Weight in (lb) to have BMI = 25: 123.5]    Physical Exam  Vitals signs reviewed.   Constitutional:       Appearance: She is well-developed.   HENT:      Head: Normocephalic and atraumatic.      Right Ear: External ear normal.      Left Ear: External ear normal.   Eyes:      Conjunctiva/sclera: Conjunctivae normal.      Pupils: Pupils are equal, round, and reactive to light.   Neck:      Vascular: No carotid bruit.   Cardiovascular:      Rate and Rhythm: Normal rate and regular rhythm.      Heart sounds: Normal heart sounds. No murmur.   Pulmonary:      Effort: Pulmonary effort is normal.      Breath sounds: Normal breath sounds. No wheezing or rales.   Abdominal:      General: Bowel sounds are normal. There is no distension.      Palpations: Abdomen is soft.      Tenderness: There is no abdominal tenderness.          Laboratory:  CBC:  Recent Labs   Lab 12/05/18  1343 08/22/19  0830 10/26/20  0909   WBC 7.18 7.20 7.30   RBC 4.57 4.63 4.53   Hemoglobin 13.2 13.1 13.0   Hematocrit 40.1 40.2 40.1   Platelets 298 280 284   MCV 88 87 89   MCH 28.9 28.3 28.7   MCHC 32.9 32.6 32.4     CMP:  Recent Labs   Lab 09/20/19  0927 12/02/19  0932 10/26/20  0909   Glucose 101 99 98   Calcium 9.8 10.2 9.7   Albumin 4.6 4.6 4.5   Total Protein 7.7 7.7 7.4   Sodium 142 146 H 144   Potassium 3.8 4.3 4.0   CO2 28 34 H 30 H   Chloride 105 103 106   BUN 14 17 16   Alkaline Phosphatase 91 85 74   ALT 36 35 40   AST 28 26 26   Total Bilirubin 0.4 0.3 0.5     URINALYSIS:  Recent Labs   Lab 09/20/19  0927 10/15/19  0958   Color, UA " Yellow Yellow   Specific Gravity, UA 1.020 1.015   pH, UA 8.0 6.0   Protein, UA Negative Negative   Bacteria Occasional Rare   Nitrite, UA Negative Negative   Leukocytes, UA 1+ A Negative   Urobilinogen, UA Negative  --       LIPIDS:  Recent Labs   Lab 08/22/19  0830 12/02/19  0932 10/26/20  0909   TSH  --   --  1.130   HDL 43 42 38 L   Cholesterol 215 H 175 190   Triglycerides 238 H 170 H 251 H   LDL Cholesterol 124.4 99.0 101.8   HDL/Cholesterol Ratio 20.0 24.0 20.0   Non-HDL Cholesterol 172 133 152   Total Cholesterol/HDL Ratio 5.0 4.2 5.0     TSH:  Recent Labs   Lab 10/26/20  0909   TSH 1.130     A1C:  Recent Labs   Lab 08/22/19  0830   Hemoglobin A1C 5.4       Assessment/Plan     Natasha Espinal is a 54 y.o.female with:    1. Annual physical exam  Discussed diet and exercise, vaccines and cancer screening, risk factors.  Screening labs reviewed  2. Essential hypertension  Continue current meds.    3. Mixed hyperlipidemia  Cont diet.  Labs reviewed.  4. History of breast cancer  F/U with Dr. Boswell as scheduled  5. Anxiety  Off meds, doing well  6. Primary insomnia  Continue current meds.      Chronic conditions status updated as per HPI.  Other than changes above, cont current medications and maintain follow up with specialists.  Return to clinic in 6 months.    Ta Espinoza MD  [facility]

## 2020-11-12 ENCOUNTER — OFFICE VISIT (OUTPATIENT)
Dept: HEMATOLOGY/ONCOLOGY | Facility: CLINIC | Age: 55
End: 2020-11-12
Payer: COMMERCIAL

## 2020-11-12 VITALS
HEIGHT: 59 IN | OXYGEN SATURATION: 96 % | WEIGHT: 167.31 LBS | DIASTOLIC BLOOD PRESSURE: 102 MMHG | HEART RATE: 100 BPM | RESPIRATION RATE: 18 BRPM | SYSTOLIC BLOOD PRESSURE: 166 MMHG | BODY MASS INDEX: 33.73 KG/M2 | TEMPERATURE: 98 F

## 2020-11-12 DIAGNOSIS — Z79.811 USE OF AROMATASE INHIBITORS: ICD-10-CM

## 2020-11-12 DIAGNOSIS — Z17.0 CARCINOMA OF AXILLARY TAIL OF RIGHT BREAST IN FEMALE, ESTROGEN RECEPTOR POSITIVE: Primary | ICD-10-CM

## 2020-11-12 DIAGNOSIS — C50.611 CARCINOMA OF AXILLARY TAIL OF RIGHT BREAST IN FEMALE, ESTROGEN RECEPTOR POSITIVE: Primary | ICD-10-CM

## 2020-11-12 PROBLEM — C50.612 CARCINOMA OF AXILLARY TAIL OF LEFT BREAST IN FEMALE, ESTROGEN RECEPTOR POSITIVE: Status: ACTIVE | Noted: 2020-07-09

## 2020-11-12 PROCEDURE — 99213 OFFICE O/P EST LOW 20 MIN: CPT | Mod: S$GLB,,, | Performed by: INTERNAL MEDICINE

## 2020-11-12 PROCEDURE — 1126F PR PAIN SEVERITY QUANTIFIED, NO PAIN PRESENT: ICD-10-PCS | Mod: S$GLB,,, | Performed by: INTERNAL MEDICINE

## 2020-11-12 PROCEDURE — 99999 PR PBB SHADOW E&M-EST. PATIENT-LVL IV: CPT | Mod: PBBFAC,,, | Performed by: INTERNAL MEDICINE

## 2020-11-12 PROCEDURE — 1126F AMNT PAIN NOTED NONE PRSNT: CPT | Mod: S$GLB,,, | Performed by: INTERNAL MEDICINE

## 2020-11-12 PROCEDURE — 3080F PR MOST RECENT DIASTOLIC BLOOD PRESSURE >= 90 MM HG: ICD-10-PCS | Mod: CPTII,S$GLB,, | Performed by: INTERNAL MEDICINE

## 2020-11-12 PROCEDURE — 3080F DIAST BP >= 90 MM HG: CPT | Mod: CPTII,S$GLB,, | Performed by: INTERNAL MEDICINE

## 2020-11-12 PROCEDURE — 3077F SYST BP >= 140 MM HG: CPT | Mod: CPTII,S$GLB,, | Performed by: INTERNAL MEDICINE

## 2020-11-12 PROCEDURE — 99999 PR PBB SHADOW E&M-EST. PATIENT-LVL IV: ICD-10-PCS | Mod: PBBFAC,,, | Performed by: INTERNAL MEDICINE

## 2020-11-12 PROCEDURE — 99213 PR OFFICE/OUTPT VISIT, EST, LEVL III, 20-29 MIN: ICD-10-PCS | Mod: S$GLB,,, | Performed by: INTERNAL MEDICINE

## 2020-11-12 PROCEDURE — 3008F BODY MASS INDEX DOCD: CPT | Mod: CPTII,S$GLB,, | Performed by: INTERNAL MEDICINE

## 2020-11-12 PROCEDURE — 3077F PR MOST RECENT SYSTOLIC BLOOD PRESSURE >= 140 MM HG: ICD-10-PCS | Mod: CPTII,S$GLB,, | Performed by: INTERNAL MEDICINE

## 2020-11-12 PROCEDURE — 3008F PR BODY MASS INDEX (BMI) DOCUMENTED: ICD-10-PCS | Mod: CPTII,S$GLB,, | Performed by: INTERNAL MEDICINE

## 2020-11-12 NOTE — PROGRESS NOTES
Subjective:       Patient ID: Natasha Espinal is a 54 y.o. female.    Chief Complaint: No chief complaint on file.    HPI     Mrs. Espinal returns today for follow up.  She has been on anastrazole since March 2018 and so far has tolerated it OK.      Briefly, she is a 54-year-old  female who was referred for evaluation after undergoing bilateral nipple-sparing mastectomies.   On the left nipple-sparing mastectomy specimen, there was a 3.4 cm tumor.  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  There was no evidence of DCIS or malignancy on the right mastectomy specimen.  A MammaPrint test was sent and suggested that she had a 97.8% chance of disease free survival at five years with hormonal therapy.    Her DXA scan in November 2019 had shown mild osteopenia of the spine and low normal density of the hip.     Review of Systems      Overall she feels OK.  She is still experiencing intermittent joint stiffness.  She recently noticed a small bump on the dorsal surface of her left middle finger and she is concerned about it  ECOG PS remains 1.  She denies any anxiety, depression, easy bruising, fevers, chills, night  sweats, weight loss, nausea, vomiting, diarrhea, constipation, diplopia, blurred vision, headache, chest pain, palpitations, shortness of breath, breast pain, abdominal pain, extremity pain, or difficulty ambulating.  The remainder of the ten-point ROS, including general, skin, lymph, H/N, cardiorespiratory, GI, , Neuro, Endocrine, and psychiatric is negative.       Objective:      Physical Exam        She is alert, oriented to time, place, person, pleasant, well nourished, in no acute physical distress.                                VITAL SIGNS:  Reviewed                                      HEENT:  Normal.  There are no nasal, oral, lip, gingival, auricular, lid,    or conjunctival lesions.  Mucosae are moist and pink, and there is no        thrush.  Pupils are equal,  reactive to light and accommodation.              Extraocular muscle movements are intact.  Dentition is good.  There is no frontal or maxillary tenderness.                                     NECK:  Supple without JVD, adenopathy, or thyromegaly.                       LUNGS:  Clear to auscultation without wheezing, rales, or rhonchi.           CARDIOVASCULAR:  Reveals an S1, S2, no murmurs, no rubs, no gallops.         ABDOMEN:  Soft, nontender, without organomegaly.  Bowel sounds are    present.   Her SHRUTHI flap incision has healed nicely.                                                                 EXTREMITIES:  No cyanosis, clubbing, or edema.  There is of 4 mm palpable nodule on the dorsal surface of the middle interphalangeal joint.                               BREASTS:  She is status post bilateral nipples paring mastectomies.     Her incisions are well healed and no masses are palpated.                               LYMPHATIC:  There is no cervical, axillary, or supraclavicular adenopathy.   SKIN:  Warm and moist, without petechiae, rashes, induration, or ecchymoses.           NEUROLOGIC:  DTRs are 0-1+ bilaterally, symmetrical, motor function is 5/5,  and cranial nerves are  within normal limits.    Assessment:       1. Carcinoma of axillary tail of left breast in female, estrogen receptor positive    2. Use of aromatase inhibitors      3.    Musculoskeletal symptoms secondary to AIs.   Plan:        I had a long discussion with her.  She will remain on anastrazole through the end of March 2023 and see me again in 4 months.   She will take vitamin D and calcium on a daily basis.  We will repeat her DXA scan in November 2021.  I again explained to her that my preference would be to extend her treatment to 7 years.  She voiced understanding.    Her multiple questions were answered to her satisfaction.

## 2020-11-24 ENCOUNTER — TELEPHONE (OUTPATIENT)
Dept: PRIMARY CARE CLINIC | Facility: CLINIC | Age: 55
End: 2020-11-24

## 2020-11-24 ENCOUNTER — CLINICAL SUPPORT (OUTPATIENT)
Dept: FAMILY MEDICINE | Facility: CLINIC | Age: 55
End: 2020-11-24
Payer: COMMERCIAL

## 2020-11-24 VITALS — DIASTOLIC BLOOD PRESSURE: 84 MMHG | HEART RATE: 89 BPM | TEMPERATURE: 98 F | SYSTOLIC BLOOD PRESSURE: 122 MMHG

## 2020-11-24 DIAGNOSIS — I10 ESSENTIAL HYPERTENSION: Primary | ICD-10-CM

## 2020-11-24 PROCEDURE — 99999 PR PBB SHADOW E&M-EST. PATIENT-LVL II: CPT | Mod: PBBFAC,,,

## 2020-11-24 PROCEDURE — 99999 PR PBB SHADOW E&M-EST. PATIENT-LVL II: ICD-10-PCS | Mod: PBBFAC,,,

## 2020-11-24 RX ORDER — OLMESARTAN MEDOXOMIL AND HYDROCHLOROTHIAZIDE 40/12.5 40; 12.5 MG/1; MG/1
1 TABLET ORAL DAILY
Qty: 90 TABLET | Refills: 3 | Status: SHIPPED | OUTPATIENT
Start: 2020-11-24 | End: 2021-11-16 | Stop reason: SDUPTHER

## 2020-11-24 NOTE — PROGRESS NOTES
Natasha Espinal 54 y.o. female is here today for Blood Pressure check.   History of HTN yes.    Review of patient's allergies indicates:   Allergen Reactions    Naproxen Itching    Sulfa (sulfonamide antibiotics) Nausea Only     Creatinine   Date Value Ref Range Status   10/26/2020 0.63 0.50 - 1.40 mg/dL Final     Sodium   Date Value Ref Range Status   10/26/2020 144 136 - 145 mmol/L Final     Potassium   Date Value Ref Range Status   10/26/2020 4.0 3.5 - 5.1 mmol/L Final   ]  Patient verifies taking blood pressure medications on a regular basis at the same time of the day.     Current Outpatient Medications:     albuterol (PROVENTIL HFA) 90 mcg/actuation inhaler, Inhale 2 puffs into the lungs every 6 (six) hours as needed for Wheezing. Rescue, Disp: 1 g, Rfl: 0    amLODIPine (NORVASC) 5 MG tablet, Take 1 tablet (5 mg total) by mouth every evening., Disp: 90 tablet, Rfl: 3    anastrozole (ARIMIDEX) 1 mg Tab, TAKE 1 TABLET BY MOUTH EVERY DAY, Disp: 90 tablet, Rfl: 2    ascorbate calcium 500 mg Tab, Take 1 tablet by mouth., Disp: , Rfl:     aspirin (ECOTRIN) 81 MG EC tablet, Take 81 mg by mouth once daily., Disp: , Rfl:     azelastine (ASTELIN) 137 mcg (0.1 %) nasal spray, 1 spray (137 mcg total) by Nasal route 2 (two) times daily. for 14 days, Disp: 30 mL, Rfl: 0    cetirizine (ZYRTEC) 10 MG tablet, Take 10 mg by mouth once daily., Disp: , Rfl:     cholecalciferol, vitamin D3, (VITAMIN D3) 2,000 unit Cap, Take 1 capsule by mouth once daily., Disp: , Rfl:     fish oil-omega-3 fatty acids 300-1,000 mg capsule, Take 1 g by mouth., Disp: , Rfl:     melatonin 5 mg Tab, Take by mouth., Disp: , Rfl:     MULTIVIT-IRON-MIN-FOLIC ACID 3,500-18-0.4 UNIT-MG-MG ORAL CHEW, Take by mouth once daily., Disp: , Rfl:     multivitamin/iron/folic acid (CENTRUM WOMEN ORAL), Take by mouth., Disp: , Rfl:     olmesartan-hydrochlorothiazide (BENICAR HCT) 40-12.5 mg Tab, Take 1 tablet by mouth once daily., Disp: 90 tablet,  Rfl: 3    prasterone, dhea, (INTRAROSA) 6.5 mg Inst, Place 6.5 mg vaginally every evening., Disp: 30 each, Rfl: 3    traZODone (DESYREL) 50 MG tablet, TAKE 1 TABLET BY MOUTH EVERY DAY AT NIGHT, Disp: 90 tablet, Rfl: 0  Does patient have record of home blood pressure readings yes. Readings have been averaging 140/93.   Last dose of blood pressure medication was taken at 0700.  Patient is asymptomatic.   Complains of na.    BP: 122/84 , Pulse: 89 .    Dr. Espinoza notified.     Patient advised of medication change and is schedule for bp check in 2 weeks will bring machine to appointment.

## 2020-12-08 ENCOUNTER — CLINICAL SUPPORT (OUTPATIENT)
Dept: FAMILY MEDICINE | Facility: CLINIC | Age: 55
End: 2020-12-08
Payer: COMMERCIAL

## 2020-12-08 VITALS — DIASTOLIC BLOOD PRESSURE: 96 MMHG | HEART RATE: 89 BPM | SYSTOLIC BLOOD PRESSURE: 146 MMHG | TEMPERATURE: 98 F

## 2020-12-08 PROCEDURE — 99999 PR PBB SHADOW E&M-EST. PATIENT-LVL II: ICD-10-PCS | Mod: PBBFAC,,,

## 2020-12-08 PROCEDURE — 99999 PR PBB SHADOW E&M-EST. PATIENT-LVL II: CPT | Mod: PBBFAC,,,

## 2020-12-08 NOTE — PROGRESS NOTES
Natasha Espinal 55 y.o. female is here today for Blood Pressure check.   History of HTN yes.    Review of patient's allergies indicates:   Allergen Reactions    Naproxen Itching    Sulfa (sulfonamide antibiotics) Nausea Only     Creatinine   Date Value Ref Range Status   10/26/2020 0.63 0.50 - 1.40 mg/dL Final     Sodium   Date Value Ref Range Status   10/26/2020 144 136 - 145 mmol/L Final     Potassium   Date Value Ref Range Status   10/26/2020 4.0 3.5 - 5.1 mmol/L Final   ]  Patient verifies taking blood pressure medications on a regular basis at the same time of the day.     Current Outpatient Medications:     albuterol (PROVENTIL HFA) 90 mcg/actuation inhaler, Inhale 2 puffs into the lungs every 6 (six) hours as needed for Wheezing. Rescue, Disp: 1 g, Rfl: 0    amLODIPine (NORVASC) 5 MG tablet, Take 1 tablet (5 mg total) by mouth every evening., Disp: 90 tablet, Rfl: 3    anastrozole (ARIMIDEX) 1 mg Tab, TAKE 1 TABLET BY MOUTH EVERY DAY, Disp: 90 tablet, Rfl: 2    ascorbate calcium 500 mg Tab, Take 1 tablet by mouth., Disp: , Rfl:     aspirin (ECOTRIN) 81 MG EC tablet, Take 81 mg by mouth once daily., Disp: , Rfl:     azelastine (ASTELIN) 137 mcg (0.1 %) nasal spray, 1 spray (137 mcg total) by Nasal route 2 (two) times daily. for 14 days, Disp: 30 mL, Rfl: 0    cetirizine (ZYRTEC) 10 MG tablet, Take 10 mg by mouth once daily., Disp: , Rfl:     cholecalciferol, vitamin D3, (VITAMIN D3) 2,000 unit Cap, Take 1 capsule by mouth once daily., Disp: , Rfl:     fish oil-omega-3 fatty acids 300-1,000 mg capsule, Take 1 g by mouth., Disp: , Rfl:     melatonin 5 mg Tab, Take by mouth., Disp: , Rfl:     MULTIVIT-IRON-MIN-FOLIC ACID 3,500-18-0.4 UNIT-MG-MG ORAL CHEW, Take by mouth once daily., Disp: , Rfl:     multivitamin/iron/folic acid (CENTRUM WOMEN ORAL), Take by mouth., Disp: , Rfl:     olmesartan-hydrochlorothiazide (BENICAR HCT) 40-12.5 mg Tab, Take 1 tablet by mouth once daily., Disp: 90 tablet,  Rfl: 3    prasterone, dhea, (INTRAROSA) 6.5 mg Inst, Place 6.5 mg vaginally every evening., Disp: 30 each, Rfl: 3    traZODone (DESYREL) 50 MG tablet, TAKE 1 TABLET BY MOUTH EVERY DAY AT NIGHT, Disp: 90 tablet, Rfl: 0  Does patient have record of home blood pressure readings yes. Readings have been averaging 134/92.   Last dose of blood pressure medication was taken at 0700.  Patient is asymptomatic.   Complains of na.    BP: (!) 146/96 , Pulse: 89 .    Blood pressure reading after 15 minutes was 143/103, Pulse 96.  Dr. Espinoza notified.     Patient advised to increase Amlodipine to 10 mg and she can take two of her 5 mg and patient is scheduled to come back for bp check in 2 weeks.

## 2020-12-22 ENCOUNTER — CLINICAL SUPPORT (OUTPATIENT)
Dept: FAMILY MEDICINE | Facility: CLINIC | Age: 55
End: 2020-12-22
Payer: COMMERCIAL

## 2020-12-22 ENCOUNTER — PATIENT MESSAGE (OUTPATIENT)
Dept: FAMILY MEDICINE | Facility: CLINIC | Age: 55
End: 2020-12-22

## 2020-12-22 VITALS
TEMPERATURE: 97 F | OXYGEN SATURATION: 97 % | DIASTOLIC BLOOD PRESSURE: 78 MMHG | HEART RATE: 93 BPM | SYSTOLIC BLOOD PRESSURE: 136 MMHG

## 2020-12-22 DIAGNOSIS — I10 ESSENTIAL HYPERTENSION: ICD-10-CM

## 2020-12-22 PROCEDURE — 99999 PR PBB SHADOW E&M-EST. PATIENT-LVL II: ICD-10-PCS | Mod: PBBFAC,,,

## 2020-12-22 PROCEDURE — 99999 PR PBB SHADOW E&M-EST. PATIENT-LVL II: CPT | Mod: PBBFAC,,,

## 2020-12-22 RX ORDER — AMLODIPINE BESYLATE 10 MG/1
10 TABLET ORAL NIGHTLY
Qty: 90 TABLET | Refills: 3 | Status: SHIPPED | OUTPATIENT
Start: 2020-12-22 | End: 2021-11-15

## 2020-12-22 NOTE — PROGRESS NOTES
Natasha Espinal 55 y.o. female is here today for Blood Pressure check.   History of HTN yes.    Review of patient's allergies indicates:   Allergen Reactions    Naproxen Itching    Sulfa (sulfonamide antibiotics) Nausea Only     Creatinine   Date Value Ref Range Status   10/26/2020 0.63 0.50 - 1.40 mg/dL Final     Sodium   Date Value Ref Range Status   10/26/2020 144 136 - 145 mmol/L Final     Potassium   Date Value Ref Range Status   10/26/2020 4.0 3.5 - 5.1 mmol/L Final   ]  Patient verifies taking blood pressure medications on a regular basis at the same time of the day.     Current Outpatient Medications:     albuterol (PROVENTIL HFA) 90 mcg/actuation inhaler, Inhale 2 puffs into the lungs every 6 (six) hours as needed for Wheezing. Rescue, Disp: 1 g, Rfl: 0    amLODIPine (NORVASC) 5 MG tablet, Take 1 tablet (5 mg total) by mouth every evening., Disp: 90 tablet, Rfl: 3    anastrozole (ARIMIDEX) 1 mg Tab, TAKE 1 TABLET BY MOUTH EVERY DAY, Disp: 90 tablet, Rfl: 2    ascorbate calcium 500 mg Tab, Take 1 tablet by mouth., Disp: , Rfl:     aspirin (ECOTRIN) 81 MG EC tablet, Take 81 mg by mouth once daily., Disp: , Rfl:     azelastine (ASTELIN) 137 mcg (0.1 %) nasal spray, 1 spray (137 mcg total) by Nasal route 2 (two) times daily. for 14 days, Disp: 30 mL, Rfl: 0    cetirizine (ZYRTEC) 10 MG tablet, Take 10 mg by mouth once daily., Disp: , Rfl:     cholecalciferol, vitamin D3, (VITAMIN D3) 2,000 unit Cap, Take 1 capsule by mouth once daily., Disp: , Rfl:     fish oil-omega-3 fatty acids 300-1,000 mg capsule, Take 1 g by mouth., Disp: , Rfl:     melatonin 5 mg Tab, Take by mouth., Disp: , Rfl:     MULTIVIT-IRON-MIN-FOLIC ACID 3,500-18-0.4 UNIT-MG-MG ORAL CHEW, Take by mouth once daily., Disp: , Rfl:     multivitamin/iron/folic acid (CENTRUM WOMEN ORAL), Take by mouth., Disp: , Rfl:     olmesartan-hydrochlorothiazide (BENICAR HCT) 40-12.5 mg Tab, Take 1 tablet by mouth once daily., Disp: 90 tablet,  Rfl: 3    prasterone, dhea, (INTRAROSA) 6.5 mg Inst, Place 6.5 mg vaginally every evening., Disp: 30 each, Rfl: 3    traZODone (DESYREL) 50 MG tablet, TAKE 1 TABLET BY MOUTH EVERY DAY AT NIGHT, Disp: 90 tablet, Rfl: 0  Does patient have record of home blood pressure readings no.   Last dose of blood pressure medication was taken at this morning.  Patient is asymptomatic.   Pt has no complaints.    BP: 136/78 , Pulse: 93 .

## 2021-02-07 NOTE — PROGRESS NOTES
OUTPATIENT PHYSICAL THERAPY DAILY NOTE       Patient: Natasha Espinal   [facility] #:  [id number]    Diagnosis:   Encounter Diagnosis   Name Primary?    Disorder of muscle       Date of treatment: 09/26/2019     Time in: 9:33  Time out: 10:03  Billable time: 30 minutes  Visit #: 3  Auth: 20 exp 12/31  POC expiration: 11/23/2019    SUBJECTIVE   Pt reports: compliance with her HEP.  She has been able to have intercourse more comfortably.    Pain: 0/10 on VAS scale  Pain location: NA    OBJECTIVE     Therapeutic Exercise: to develop core stability and maintaining proper alignment x 30 minutes.  With pt's verbal consent, an intravaginal muscle assessment was performed.  Neither LA's nor OI's were painful- L OI was noted to be non-tender externally as well.    With a check of her pelvic alignment, her malleoli and ASIS were symmetrical.  She was instructed in bridging, resisted OI set, resisted add set, and oscillations to be performed to maintain her pelvic stability.  We reviewed her ME technique for correction of innominate rotation as well (to be done if she starts feeling symptoms again).  Pt was issued a handout and verbalized understanding of all.        ASSESSMENT      Pt is symptom free and is I with her home program.  No need for continued PT services at this time.      goals: Pt will report successfully having intercourse with < or = 2/10 pain for an improvement in activity tolerance.   Pt/family will be independent with HEP for continued self-management of symptoms.  BOTH MET      PLAN     D/c PT  
unable to obtain- patient intubated

## 2021-02-08 ENCOUNTER — PATIENT MESSAGE (OUTPATIENT)
Dept: HEMATOLOGY/ONCOLOGY | Facility: CLINIC | Age: 56
End: 2021-02-08

## 2021-03-29 ENCOUNTER — OFFICE VISIT (OUTPATIENT)
Dept: HEMATOLOGY/ONCOLOGY | Facility: CLINIC | Age: 56
End: 2021-03-29
Payer: COMMERCIAL

## 2021-03-29 VITALS
HEIGHT: 59 IN | DIASTOLIC BLOOD PRESSURE: 91 MMHG | OXYGEN SATURATION: 96 % | BODY MASS INDEX: 34.53 KG/M2 | RESPIRATION RATE: 16 BRPM | TEMPERATURE: 98 F | HEART RATE: 88 BPM | SYSTOLIC BLOOD PRESSURE: 153 MMHG | WEIGHT: 171.31 LBS

## 2021-03-29 DIAGNOSIS — Z79.811 USE OF AROMATASE INHIBITORS: ICD-10-CM

## 2021-03-29 DIAGNOSIS — Z17.0 CARCINOMA OF AXILLARY TAIL OF LEFT BREAST IN FEMALE, ESTROGEN RECEPTOR POSITIVE: Primary | ICD-10-CM

## 2021-03-29 DIAGNOSIS — C50.612 CARCINOMA OF AXILLARY TAIL OF LEFT BREAST IN FEMALE, ESTROGEN RECEPTOR POSITIVE: Primary | ICD-10-CM

## 2021-03-29 PROCEDURE — 99213 OFFICE O/P EST LOW 20 MIN: CPT | Mod: S$GLB,,, | Performed by: INTERNAL MEDICINE

## 2021-03-29 PROCEDURE — 3080F PR MOST RECENT DIASTOLIC BLOOD PRESSURE >= 90 MM HG: ICD-10-PCS | Mod: CPTII,S$GLB,, | Performed by: INTERNAL MEDICINE

## 2021-03-29 PROCEDURE — 1126F AMNT PAIN NOTED NONE PRSNT: CPT | Mod: S$GLB,,, | Performed by: INTERNAL MEDICINE

## 2021-03-29 PROCEDURE — 99999 PR PBB SHADOW E&M-EST. PATIENT-LVL IV: ICD-10-PCS | Mod: PBBFAC,,, | Performed by: INTERNAL MEDICINE

## 2021-03-29 PROCEDURE — 3077F PR MOST RECENT SYSTOLIC BLOOD PRESSURE >= 140 MM HG: ICD-10-PCS | Mod: CPTII,S$GLB,, | Performed by: INTERNAL MEDICINE

## 2021-03-29 PROCEDURE — 3080F DIAST BP >= 90 MM HG: CPT | Mod: CPTII,S$GLB,, | Performed by: INTERNAL MEDICINE

## 2021-03-29 PROCEDURE — 3008F BODY MASS INDEX DOCD: CPT | Mod: CPTII,S$GLB,, | Performed by: INTERNAL MEDICINE

## 2021-03-29 PROCEDURE — 3077F SYST BP >= 140 MM HG: CPT | Mod: CPTII,S$GLB,, | Performed by: INTERNAL MEDICINE

## 2021-03-29 PROCEDURE — 99999 PR PBB SHADOW E&M-EST. PATIENT-LVL IV: CPT | Mod: PBBFAC,,, | Performed by: INTERNAL MEDICINE

## 2021-03-29 PROCEDURE — 3008F PR BODY MASS INDEX (BMI) DOCUMENTED: ICD-10-PCS | Mod: CPTII,S$GLB,, | Performed by: INTERNAL MEDICINE

## 2021-03-29 PROCEDURE — 1126F PR PAIN SEVERITY QUANTIFIED, NO PAIN PRESENT: ICD-10-PCS | Mod: S$GLB,,, | Performed by: INTERNAL MEDICINE

## 2021-03-29 PROCEDURE — 99213 PR OFFICE/OUTPT VISIT, EST, LEVL III, 20-29 MIN: ICD-10-PCS | Mod: S$GLB,,, | Performed by: INTERNAL MEDICINE

## 2021-05-11 ENCOUNTER — OFFICE VISIT (OUTPATIENT)
Dept: FAMILY MEDICINE | Facility: CLINIC | Age: 56
End: 2021-05-11
Payer: COMMERCIAL

## 2021-05-11 VITALS
TEMPERATURE: 98 F | BODY MASS INDEX: 34.33 KG/M2 | OXYGEN SATURATION: 98 % | SYSTOLIC BLOOD PRESSURE: 120 MMHG | WEIGHT: 170.31 LBS | HEIGHT: 59 IN | HEART RATE: 93 BPM | DIASTOLIC BLOOD PRESSURE: 88 MMHG

## 2021-05-11 DIAGNOSIS — E78.2 MIXED HYPERLIPIDEMIA: ICD-10-CM

## 2021-05-11 DIAGNOSIS — Z85.3 HISTORY OF BREAST CANCER: ICD-10-CM

## 2021-05-11 DIAGNOSIS — F41.9 ANXIETY: ICD-10-CM

## 2021-05-11 DIAGNOSIS — Z00.00 ANNUAL PHYSICAL EXAM: ICD-10-CM

## 2021-05-11 DIAGNOSIS — F51.01 PRIMARY INSOMNIA: ICD-10-CM

## 2021-05-11 DIAGNOSIS — I10 ESSENTIAL HYPERTENSION: Primary | ICD-10-CM

## 2021-05-11 PROCEDURE — 3008F BODY MASS INDEX DOCD: CPT | Mod: CPTII,S$GLB,, | Performed by: INTERNAL MEDICINE

## 2021-05-11 PROCEDURE — 3074F SYST BP LT 130 MM HG: CPT | Mod: CPTII,S$GLB,, | Performed by: INTERNAL MEDICINE

## 2021-05-11 PROCEDURE — 3074F PR MOST RECENT SYSTOLIC BLOOD PRESSURE < 130 MM HG: ICD-10-PCS | Mod: CPTII,S$GLB,, | Performed by: INTERNAL MEDICINE

## 2021-05-11 PROCEDURE — 99214 OFFICE O/P EST MOD 30 MIN: CPT | Mod: S$GLB,,, | Performed by: INTERNAL MEDICINE

## 2021-05-11 PROCEDURE — 3079F DIAST BP 80-89 MM HG: CPT | Mod: CPTII,S$GLB,, | Performed by: INTERNAL MEDICINE

## 2021-05-11 PROCEDURE — 99999 PR PBB SHADOW E&M-EST. PATIENT-LVL IV: ICD-10-PCS | Mod: PBBFAC,,, | Performed by: INTERNAL MEDICINE

## 2021-05-11 PROCEDURE — 99999 PR PBB SHADOW E&M-EST. PATIENT-LVL IV: CPT | Mod: PBBFAC,,, | Performed by: INTERNAL MEDICINE

## 2021-05-11 PROCEDURE — 99214 PR OFFICE/OUTPT VISIT, EST, LEVL IV, 30-39 MIN: ICD-10-PCS | Mod: S$GLB,,, | Performed by: INTERNAL MEDICINE

## 2021-05-11 PROCEDURE — 3008F PR BODY MASS INDEX (BMI) DOCUMENTED: ICD-10-PCS | Mod: CPTII,S$GLB,, | Performed by: INTERNAL MEDICINE

## 2021-05-11 PROCEDURE — 1126F PR PAIN SEVERITY QUANTIFIED, NO PAIN PRESENT: ICD-10-PCS | Mod: S$GLB,,, | Performed by: INTERNAL MEDICINE

## 2021-05-11 PROCEDURE — 3079F PR MOST RECENT DIASTOLIC BLOOD PRESSURE 80-89 MM HG: ICD-10-PCS | Mod: CPTII,S$GLB,, | Performed by: INTERNAL MEDICINE

## 2021-05-11 PROCEDURE — 1126F AMNT PAIN NOTED NONE PRSNT: CPT | Mod: S$GLB,,, | Performed by: INTERNAL MEDICINE

## 2021-05-23 ENCOUNTER — PATIENT MESSAGE (OUTPATIENT)
Dept: HEMATOLOGY/ONCOLOGY | Facility: CLINIC | Age: 56
End: 2021-05-23

## 2021-05-25 ENCOUNTER — HOSPITAL ENCOUNTER (OUTPATIENT)
Dept: RADIOLOGY | Facility: HOSPITAL | Age: 56
Discharge: HOME | End: 2021-05-25
Attending: INTERNAL MEDICINE
Payer: COMMERCIAL

## 2021-05-25 ENCOUNTER — OFFICE VISIT (OUTPATIENT)
Dept: HEMATOLOGY/ONCOLOGY | Facility: CLINIC | Age: 56
End: 2021-05-25
Payer: COMMERCIAL

## 2021-05-25 VITALS
BODY MASS INDEX: 33.93 KG/M2 | HEIGHT: 59 IN | WEIGHT: 168 LBS | SYSTOLIC BLOOD PRESSURE: 141 MMHG | WEIGHT: 170.63 LBS | RESPIRATION RATE: 18 BRPM | BODY MASS INDEX: 34.4 KG/M2 | DIASTOLIC BLOOD PRESSURE: 87 MMHG | HEART RATE: 97 BPM

## 2021-05-25 DIAGNOSIS — R52 PAIN: ICD-10-CM

## 2021-05-25 DIAGNOSIS — R52 PAIN: Primary | ICD-10-CM

## 2021-05-25 PROCEDURE — 77061 BREAST TOMOSYNTHESIS UNI: CPT | Mod: TC,LT

## 2021-05-25 PROCEDURE — 77061 BREAST TOMOSYNTHESIS UNI: CPT | Mod: 26,LT,, | Performed by: RADIOLOGY

## 2021-05-25 PROCEDURE — 77065 DX MAMMO INCL CAD UNI: CPT | Mod: 26,LT,, | Performed by: RADIOLOGY

## 2021-05-25 PROCEDURE — 77061 MAMMO DIGITAL DIAGNOSTIC LEFT WITH TOMO: ICD-10-PCS | Mod: 26,LT,, | Performed by: RADIOLOGY

## 2021-05-25 PROCEDURE — 3008F PR BODY MASS INDEX (BMI) DOCUMENTED: ICD-10-PCS | Mod: CPTII,S$GLB,, | Performed by: INTERNAL MEDICINE

## 2021-05-25 PROCEDURE — 99999 PR PBB SHADOW E&M-EST. PATIENT-LVL IV: ICD-10-PCS | Mod: PBBFAC,,, | Performed by: INTERNAL MEDICINE

## 2021-05-25 PROCEDURE — 1125F PR PAIN SEVERITY QUANTIFIED, PAIN PRESENT: ICD-10-PCS | Mod: S$GLB,,, | Performed by: INTERNAL MEDICINE

## 2021-05-25 PROCEDURE — 77065 MAMMO DIGITAL DIAGNOSTIC LEFT WITH TOMO: ICD-10-PCS | Mod: 26,LT,, | Performed by: RADIOLOGY

## 2021-05-25 PROCEDURE — 99214 PR OFFICE/OUTPT VISIT, EST, LEVL IV, 30-39 MIN: ICD-10-PCS | Mod: S$GLB,,, | Performed by: INTERNAL MEDICINE

## 2021-05-25 PROCEDURE — 1125F AMNT PAIN NOTED PAIN PRSNT: CPT | Mod: S$GLB,,, | Performed by: INTERNAL MEDICINE

## 2021-05-25 PROCEDURE — 99999 PR PBB SHADOW E&M-EST. PATIENT-LVL IV: CPT | Mod: PBBFAC,,, | Performed by: INTERNAL MEDICINE

## 2021-05-25 PROCEDURE — 99214 OFFICE O/P EST MOD 30 MIN: CPT | Mod: S$GLB,,, | Performed by: INTERNAL MEDICINE

## 2021-05-25 PROCEDURE — 3008F BODY MASS INDEX DOCD: CPT | Mod: CPTII,S$GLB,, | Performed by: INTERNAL MEDICINE

## 2021-05-25 RX ORDER — HYDROXYZINE HYDROCHLORIDE 25 MG/1
25 TABLET, FILM COATED ORAL 3 TIMES DAILY PRN
Qty: 60 TABLET | Refills: 1 | Status: SHIPPED | OUTPATIENT
Start: 2021-05-25

## 2021-06-28 ENCOUNTER — PATIENT MESSAGE (OUTPATIENT)
Dept: FAMILY MEDICINE | Facility: CLINIC | Age: 56
End: 2021-06-28

## 2021-06-28 DIAGNOSIS — Z87.09 HISTORY OF ASTHMA: ICD-10-CM

## 2021-06-28 RX ORDER — ALBUTEROL SULFATE 90 UG/1
2 AEROSOL, METERED RESPIRATORY (INHALATION) EVERY 6 HOURS PRN
Qty: 16 G | Refills: 3 | Status: SHIPPED | OUTPATIENT
Start: 2021-06-28 | End: 2022-11-02

## 2021-08-05 ENCOUNTER — OFFICE VISIT (OUTPATIENT)
Dept: HEMATOLOGY/ONCOLOGY | Facility: CLINIC | Age: 56
End: 2021-08-05
Payer: COMMERCIAL

## 2021-08-05 VITALS
SYSTOLIC BLOOD PRESSURE: 153 MMHG | OXYGEN SATURATION: 95 % | HEIGHT: 59 IN | WEIGHT: 168 LBS | RESPIRATION RATE: 18 BRPM | TEMPERATURE: 98 F | BODY MASS INDEX: 33.87 KG/M2 | HEART RATE: 116 BPM | DIASTOLIC BLOOD PRESSURE: 83 MMHG

## 2021-08-05 DIAGNOSIS — M25.50 AROMATASE INHIBITOR-ASSOCIATED ARTHRALGIA: ICD-10-CM

## 2021-08-05 DIAGNOSIS — C50.612 CARCINOMA OF AXILLARY TAIL OF LEFT BREAST IN FEMALE, ESTROGEN RECEPTOR POSITIVE: Primary | ICD-10-CM

## 2021-08-05 DIAGNOSIS — T45.1X5A AROMATASE INHIBITOR-ASSOCIATED ARTHRALGIA: ICD-10-CM

## 2021-08-05 DIAGNOSIS — Z17.0 CARCINOMA OF AXILLARY TAIL OF LEFT BREAST IN FEMALE, ESTROGEN RECEPTOR POSITIVE: Primary | ICD-10-CM

## 2021-08-05 DIAGNOSIS — Z79.811 USE OF AROMATASE INHIBITORS: ICD-10-CM

## 2021-08-05 PROCEDURE — 1126F PR PAIN SEVERITY QUANTIFIED, NO PAIN PRESENT: ICD-10-PCS | Mod: CPTII,S$GLB,, | Performed by: INTERNAL MEDICINE

## 2021-08-05 PROCEDURE — 1160F PR REVIEW ALL MEDS BY PRESCRIBER/CLIN PHARMACIST DOCUMENTED: ICD-10-PCS | Mod: CPTII,S$GLB,, | Performed by: INTERNAL MEDICINE

## 2021-08-05 PROCEDURE — 99999 PR PBB SHADOW E&M-EST. PATIENT-LVL IV: CPT | Mod: PBBFAC,,, | Performed by: INTERNAL MEDICINE

## 2021-08-05 PROCEDURE — 99213 OFFICE O/P EST LOW 20 MIN: CPT | Mod: S$GLB,,, | Performed by: INTERNAL MEDICINE

## 2021-08-05 PROCEDURE — 99999 PR PBB SHADOW E&M-EST. PATIENT-LVL IV: ICD-10-PCS | Mod: PBBFAC,,, | Performed by: INTERNAL MEDICINE

## 2021-08-05 PROCEDURE — 3008F PR BODY MASS INDEX (BMI) DOCUMENTED: ICD-10-PCS | Mod: CPTII,S$GLB,, | Performed by: INTERNAL MEDICINE

## 2021-08-05 PROCEDURE — 99213 PR OFFICE/OUTPT VISIT, EST, LEVL III, 20-29 MIN: ICD-10-PCS | Mod: S$GLB,,, | Performed by: INTERNAL MEDICINE

## 2021-08-05 PROCEDURE — 1126F AMNT PAIN NOTED NONE PRSNT: CPT | Mod: CPTII,S$GLB,, | Performed by: INTERNAL MEDICINE

## 2021-08-05 PROCEDURE — 3077F PR MOST RECENT SYSTOLIC BLOOD PRESSURE >= 140 MM HG: ICD-10-PCS | Mod: CPTII,S$GLB,, | Performed by: INTERNAL MEDICINE

## 2021-08-05 PROCEDURE — 1159F MED LIST DOCD IN RCRD: CPT | Mod: CPTII,S$GLB,, | Performed by: INTERNAL MEDICINE

## 2021-08-05 PROCEDURE — 3079F DIAST BP 80-89 MM HG: CPT | Mod: CPTII,S$GLB,, | Performed by: INTERNAL MEDICINE

## 2021-08-05 PROCEDURE — 3077F SYST BP >= 140 MM HG: CPT | Mod: CPTII,S$GLB,, | Performed by: INTERNAL MEDICINE

## 2021-08-05 PROCEDURE — 3008F BODY MASS INDEX DOCD: CPT | Mod: CPTII,S$GLB,, | Performed by: INTERNAL MEDICINE

## 2021-08-05 PROCEDURE — 3079F PR MOST RECENT DIASTOLIC BLOOD PRESSURE 80-89 MM HG: ICD-10-PCS | Mod: CPTII,S$GLB,, | Performed by: INTERNAL MEDICINE

## 2021-08-05 PROCEDURE — 1159F PR MEDICATION LIST DOCUMENTED IN MEDICAL RECORD: ICD-10-PCS | Mod: CPTII,S$GLB,, | Performed by: INTERNAL MEDICINE

## 2021-08-05 PROCEDURE — 1160F RVW MEDS BY RX/DR IN RCRD: CPT | Mod: CPTII,S$GLB,, | Performed by: INTERNAL MEDICINE

## 2021-08-05 RX ORDER — ANASTROZOLE 1 MG/1
1 TABLET ORAL DAILY
Qty: 90 TABLET | Refills: 2 | Status: SHIPPED | OUTPATIENT
Start: 2021-08-05 | End: 2022-03-17

## 2021-09-07 ENCOUNTER — PATIENT MESSAGE (OUTPATIENT)
Dept: FAMILY MEDICINE | Facility: CLINIC | Age: 56
End: 2021-09-07

## 2021-09-21 ENCOUNTER — PATIENT MESSAGE (OUTPATIENT)
Dept: FAMILY MEDICINE | Facility: CLINIC | Age: 56
End: 2021-09-21

## 2021-09-22 ENCOUNTER — PATIENT MESSAGE (OUTPATIENT)
Dept: FAMILY MEDICINE | Facility: CLINIC | Age: 56
End: 2021-09-22

## 2021-09-22 DIAGNOSIS — R41.3 MEMORY LOSS: Primary | ICD-10-CM

## 2021-11-09 ENCOUNTER — IMMUNIZATION (OUTPATIENT)
Dept: FAMILY MEDICINE | Facility: CLINIC | Age: 56
End: 2021-11-09
Payer: COMMERCIAL

## 2021-11-09 DIAGNOSIS — Z23 NEED FOR VACCINATION: Primary | ICD-10-CM

## 2021-11-09 PROCEDURE — 91300 COVID-19, MRNA, LNP-S, PF, 30 MCG/0.3 ML DOSE VACCINE: CPT | Mod: PBBFAC | Performed by: FAMILY MEDICINE

## 2021-11-16 ENCOUNTER — OFFICE VISIT (OUTPATIENT)
Dept: FAMILY MEDICINE | Facility: CLINIC | Age: 56
End: 2021-11-16
Payer: COMMERCIAL

## 2021-11-16 VITALS
SYSTOLIC BLOOD PRESSURE: 122 MMHG | OXYGEN SATURATION: 98 % | WEIGHT: 165.69 LBS | TEMPERATURE: 98 F | HEIGHT: 59 IN | BODY MASS INDEX: 33.4 KG/M2 | DIASTOLIC BLOOD PRESSURE: 82 MMHG | HEART RATE: 84 BPM

## 2021-11-16 DIAGNOSIS — I10 ESSENTIAL HYPERTENSION: ICD-10-CM

## 2021-11-16 DIAGNOSIS — Z12.11 SCREEN FOR COLON CANCER: ICD-10-CM

## 2021-11-16 DIAGNOSIS — R09.A2 GLOBUS SENSATION: ICD-10-CM

## 2021-11-16 DIAGNOSIS — J45.20 MILD INTERMITTENT ASTHMA WITHOUT COMPLICATION: Primary | ICD-10-CM

## 2021-11-16 DIAGNOSIS — F51.01 PRIMARY INSOMNIA: ICD-10-CM

## 2021-11-16 PROCEDURE — 1160F RVW MEDS BY RX/DR IN RCRD: CPT | Mod: CPTII,S$GLB,, | Performed by: INTERNAL MEDICINE

## 2021-11-16 PROCEDURE — 1159F PR MEDICATION LIST DOCUMENTED IN MEDICAL RECORD: ICD-10-PCS | Mod: CPTII,S$GLB,, | Performed by: INTERNAL MEDICINE

## 2021-11-16 PROCEDURE — 3008F PR BODY MASS INDEX (BMI) DOCUMENTED: ICD-10-PCS | Mod: CPTII,S$GLB,, | Performed by: INTERNAL MEDICINE

## 2021-11-16 PROCEDURE — 1160F PR REVIEW ALL MEDS BY PRESCRIBER/CLIN PHARMACIST DOCUMENTED: ICD-10-PCS | Mod: CPTII,S$GLB,, | Performed by: INTERNAL MEDICINE

## 2021-11-16 PROCEDURE — 3008F BODY MASS INDEX DOCD: CPT | Mod: CPTII,S$GLB,, | Performed by: INTERNAL MEDICINE

## 2021-11-16 PROCEDURE — 99999 PR PBB SHADOW E&M-EST. PATIENT-LVL V: ICD-10-PCS | Mod: PBBFAC,,, | Performed by: INTERNAL MEDICINE

## 2021-11-16 PROCEDURE — 99396 PR PREVENTIVE VISIT,EST,40-64: ICD-10-PCS | Mod: S$GLB,,, | Performed by: INTERNAL MEDICINE

## 2021-11-16 PROCEDURE — 99396 PREV VISIT EST AGE 40-64: CPT | Mod: S$GLB,,, | Performed by: INTERNAL MEDICINE

## 2021-11-16 PROCEDURE — 3079F DIAST BP 80-89 MM HG: CPT | Mod: CPTII,S$GLB,, | Performed by: INTERNAL MEDICINE

## 2021-11-16 PROCEDURE — 3074F PR MOST RECENT SYSTOLIC BLOOD PRESSURE < 130 MM HG: ICD-10-PCS | Mod: CPTII,S$GLB,, | Performed by: INTERNAL MEDICINE

## 2021-11-16 PROCEDURE — 3074F SYST BP LT 130 MM HG: CPT | Mod: CPTII,S$GLB,, | Performed by: INTERNAL MEDICINE

## 2021-11-16 PROCEDURE — 3079F PR MOST RECENT DIASTOLIC BLOOD PRESSURE 80-89 MM HG: ICD-10-PCS | Mod: CPTII,S$GLB,, | Performed by: INTERNAL MEDICINE

## 2021-11-16 PROCEDURE — 1159F MED LIST DOCD IN RCRD: CPT | Mod: CPTII,S$GLB,, | Performed by: INTERNAL MEDICINE

## 2021-11-16 PROCEDURE — 99999 PR PBB SHADOW E&M-EST. PATIENT-LVL V: CPT | Mod: PBBFAC,,, | Performed by: INTERNAL MEDICINE

## 2021-11-16 RX ORDER — FAMOTIDINE 20 MG/1
20 TABLET, FILM COATED ORAL 2 TIMES DAILY
Qty: 60 TABLET | Refills: 11
Start: 2021-11-16 | End: 2023-11-13

## 2021-11-16 RX ORDER — TRAZODONE HYDROCHLORIDE 50 MG/1
50 TABLET ORAL NIGHTLY PRN
Qty: 90 TABLET | Refills: 3 | Status: SHIPPED | OUTPATIENT
Start: 2021-11-16 | End: 2022-11-12

## 2021-11-16 RX ORDER — OLMESARTAN MEDOXOMIL AND HYDROCHLOROTHIAZIDE 40/12.5 40; 12.5 MG/1; MG/1
1 TABLET ORAL DAILY
Qty: 90 TABLET | Refills: 3 | Status: SHIPPED | OUTPATIENT
Start: 2021-11-16 | End: 2022-10-24

## 2021-11-18 ENCOUNTER — LAB VISIT (OUTPATIENT)
Dept: LAB | Facility: HOSPITAL | Age: 56
End: 2021-11-18
Attending: INTERNAL MEDICINE
Payer: COMMERCIAL

## 2021-11-18 ENCOUNTER — TELEPHONE (OUTPATIENT)
Dept: PULMONOLOGY | Facility: CLINIC | Age: 56
End: 2021-11-18
Payer: COMMERCIAL

## 2021-11-18 ENCOUNTER — PATIENT OUTREACH (OUTPATIENT)
Dept: ADMINISTRATIVE | Facility: OTHER | Age: 56
End: 2021-11-18
Payer: COMMERCIAL

## 2021-11-18 DIAGNOSIS — Z12.11 SCREEN FOR COLON CANCER: ICD-10-CM

## 2021-11-18 DIAGNOSIS — J45.909 ASTHMA, UNSPECIFIED ASTHMA SEVERITY, UNSPECIFIED WHETHER COMPLICATED, UNSPECIFIED WHETHER PERSISTENT: Primary | ICD-10-CM

## 2021-11-18 PROCEDURE — 82274 ASSAY TEST FOR BLOOD FECAL: CPT | Performed by: INTERNAL MEDICINE

## 2021-11-23 LAB — HEMOCCULT STL QL IA: NEGATIVE

## 2021-11-29 ENCOUNTER — HOSPITAL ENCOUNTER (OUTPATIENT)
Dept: PULMONOLOGY | Facility: CLINIC | Age: 56
Discharge: HOME | End: 2021-11-29
Payer: COMMERCIAL

## 2021-11-29 ENCOUNTER — OFFICE VISIT (OUTPATIENT)
Dept: PULMONOLOGY | Facility: CLINIC | Age: 56
End: 2021-11-29
Payer: COMMERCIAL

## 2021-11-29 VITALS
OXYGEN SATURATION: 94 % | WEIGHT: 167 LBS | DIASTOLIC BLOOD PRESSURE: 82 MMHG | BODY MASS INDEX: 33.67 KG/M2 | SYSTOLIC BLOOD PRESSURE: 138 MMHG | HEIGHT: 59 IN | HEART RATE: 107 BPM

## 2021-11-29 DIAGNOSIS — J45.909 ASTHMA, UNSPECIFIED ASTHMA SEVERITY, UNSPECIFIED WHETHER COMPLICATED, UNSPECIFIED WHETHER PERSISTENT: ICD-10-CM

## 2021-11-29 DIAGNOSIS — R09.82 POST-NASAL DRIP: ICD-10-CM

## 2021-11-29 DIAGNOSIS — J45.20 MILD INTERMITTENT ASTHMA WITHOUT COMPLICATION: ICD-10-CM

## 2021-11-29 DIAGNOSIS — J30.9 ALLERGIC RHINITIS, UNSPECIFIED SEASONALITY, UNSPECIFIED TRIGGER: ICD-10-CM

## 2021-11-29 DIAGNOSIS — J39.8 TRACHEAL STENOSIS: Primary | ICD-10-CM

## 2021-11-29 PROCEDURE — 94729 DIFFUSING CAPACITY: CPT | Mod: S$GLB,,, | Performed by: INTERNAL MEDICINE

## 2021-11-29 PROCEDURE — 94727 GAS DIL/WSHOT DETER LNG VOL: CPT | Mod: S$GLB,,, | Performed by: INTERNAL MEDICINE

## 2021-11-29 PROCEDURE — 99204 PR OFFICE/OUTPT VISIT, NEW, LEVL IV, 45-59 MIN: ICD-10-PCS | Mod: S$GLB,,, | Performed by: INTERNAL MEDICINE

## 2021-11-29 PROCEDURE — 99204 OFFICE O/P NEW MOD 45 MIN: CPT | Mod: S$GLB,,, | Performed by: INTERNAL MEDICINE

## 2021-11-29 PROCEDURE — 99999 PR PBB SHADOW E&M-EST. PATIENT-LVL V: CPT | Mod: PBBFAC,,, | Performed by: INTERNAL MEDICINE

## 2021-11-29 PROCEDURE — 94060 PR EVAL OF BRONCHOSPASM: ICD-10-PCS | Mod: S$GLB,,, | Performed by: INTERNAL MEDICINE

## 2021-11-29 PROCEDURE — 94729 PR C02/MEMBANE DIFFUSE CAPACITY: ICD-10-PCS | Mod: S$GLB,,, | Performed by: INTERNAL MEDICINE

## 2021-11-29 PROCEDURE — 94060 EVALUATION OF WHEEZING: CPT | Mod: S$GLB,,, | Performed by: INTERNAL MEDICINE

## 2021-11-29 PROCEDURE — 94727 PR PULM FUNCTION TEST BY GAS: ICD-10-PCS | Mod: S$GLB,,, | Performed by: INTERNAL MEDICINE

## 2021-11-29 PROCEDURE — 99999 PR PBB SHADOW E&M-EST. PATIENT-LVL V: ICD-10-PCS | Mod: PBBFAC,,, | Performed by: INTERNAL MEDICINE

## 2021-11-29 RX ORDER — FLUTICASONE PROPIONATE 50 MCG
1 SPRAY, SUSPENSION (ML) NASAL DAILY
Qty: 11.1 ML | Refills: 2 | Status: SHIPPED | OUTPATIENT
Start: 2021-11-29

## 2021-12-06 ENCOUNTER — OFFICE VISIT (OUTPATIENT)
Dept: HEMATOLOGY/ONCOLOGY | Facility: CLINIC | Age: 56
End: 2021-12-06
Payer: COMMERCIAL

## 2021-12-06 VITALS
HEART RATE: 99 BPM | SYSTOLIC BLOOD PRESSURE: 158 MMHG | BODY MASS INDEX: 33.34 KG/M2 | HEIGHT: 59 IN | OXYGEN SATURATION: 95 % | RESPIRATION RATE: 16 BRPM | TEMPERATURE: 98 F | WEIGHT: 165.38 LBS | DIASTOLIC BLOOD PRESSURE: 89 MMHG

## 2021-12-06 DIAGNOSIS — Z79.811 USE OF AROMATASE INHIBITORS: ICD-10-CM

## 2021-12-06 DIAGNOSIS — Z17.0 CARCINOMA OF AXILLARY TAIL OF LEFT BREAST IN FEMALE, ESTROGEN RECEPTOR POSITIVE: Primary | ICD-10-CM

## 2021-12-06 DIAGNOSIS — C50.612 CARCINOMA OF AXILLARY TAIL OF LEFT BREAST IN FEMALE, ESTROGEN RECEPTOR POSITIVE: Primary | ICD-10-CM

## 2021-12-06 PROCEDURE — 99999 PR PBB SHADOW E&M-EST. PATIENT-LVL IV: ICD-10-PCS | Mod: PBBFAC,,, | Performed by: INTERNAL MEDICINE

## 2021-12-06 PROCEDURE — 99214 PR OFFICE/OUTPT VISIT, EST, LEVL IV, 30-39 MIN: ICD-10-PCS | Mod: S$GLB,,, | Performed by: INTERNAL MEDICINE

## 2021-12-06 PROCEDURE — 99214 OFFICE O/P EST MOD 30 MIN: CPT | Mod: S$GLB,,, | Performed by: INTERNAL MEDICINE

## 2021-12-06 PROCEDURE — 99999 PR PBB SHADOW E&M-EST. PATIENT-LVL IV: CPT | Mod: PBBFAC,,, | Performed by: INTERNAL MEDICINE

## 2021-12-07 ENCOUNTER — TELEPHONE (OUTPATIENT)
Dept: OTOLARYNGOLOGY | Facility: CLINIC | Age: 56
End: 2021-12-07
Payer: COMMERCIAL

## 2021-12-08 ENCOUNTER — TELEPHONE (OUTPATIENT)
Dept: OTOLARYNGOLOGY | Facility: CLINIC | Age: 56
End: 2021-12-08
Payer: COMMERCIAL

## 2021-12-20 ENCOUNTER — PATIENT OUTREACH (OUTPATIENT)
Dept: ADMINISTRATIVE | Facility: OTHER | Age: 56
End: 2021-12-20
Payer: COMMERCIAL

## 2021-12-21 ENCOUNTER — OFFICE VISIT (OUTPATIENT)
Dept: ALLERGY | Facility: CLINIC | Age: 56
End: 2021-12-21
Payer: COMMERCIAL

## 2021-12-21 ENCOUNTER — LAB VISIT (OUTPATIENT)
Dept: LAB | Facility: HOSPITAL | Age: 56
End: 2021-12-21
Attending: ALLERGY & IMMUNOLOGY
Payer: COMMERCIAL

## 2021-12-21 VITALS — WEIGHT: 167.13 LBS | BODY MASS INDEX: 33.69 KG/M2 | HEIGHT: 59 IN

## 2021-12-21 DIAGNOSIS — R06.02 SHORTNESS OF BREATH: ICD-10-CM

## 2021-12-21 DIAGNOSIS — J32.9 RECURRENT SINUS INFECTIONS: ICD-10-CM

## 2021-12-21 DIAGNOSIS — J31.0 CHRONIC RHINITIS: ICD-10-CM

## 2021-12-21 DIAGNOSIS — J31.0 CHRONIC RHINITIS: Primary | ICD-10-CM

## 2021-12-21 LAB
BASOPHILS # BLD AUTO: 0.06 K/UL (ref 0–0.2)
BASOPHILS NFR BLD: 0.9 % (ref 0–1.9)
DIFFERENTIAL METHOD: ABNORMAL
EOSINOPHIL # BLD AUTO: 0.3 K/UL (ref 0–0.5)
EOSINOPHIL NFR BLD: 3.7 % (ref 0–8)
ERYTHROCYTE [DISTWIDTH] IN BLOOD BY AUTOMATED COUNT: 13.2 % (ref 11.5–14.5)
HCT VFR BLD AUTO: 44.8 % (ref 37–48.5)
HGB BLD-MCNC: 14 G/DL (ref 12–16)
IGA SERPL-MCNC: 154 MG/DL (ref 40–350)
IGE SERPL-ACNC: 62 IU/ML (ref 0–100)
IGG SERPL-MCNC: 898 MG/DL (ref 650–1600)
IGM SERPL-MCNC: 130 MG/DL (ref 50–300)
IMM GRANULOCYTES # BLD AUTO: 0.05 K/UL (ref 0–0.04)
IMM GRANULOCYTES NFR BLD AUTO: 0.7 % (ref 0–0.5)
LYMPHOCYTES # BLD AUTO: 2.1 K/UL (ref 1–4.8)
LYMPHOCYTES NFR BLD: 30.1 % (ref 18–48)
MCH RBC QN AUTO: 28.6 PG (ref 27–31)
MCHC RBC AUTO-ENTMCNC: 31.3 G/DL (ref 32–36)
MCV RBC AUTO: 91 FL (ref 82–98)
MONOCYTES # BLD AUTO: 0.4 K/UL (ref 0.3–1)
MONOCYTES NFR BLD: 5.8 % (ref 4–15)
NEUTROPHILS # BLD AUTO: 4.1 K/UL (ref 1.8–7.7)
NEUTROPHILS NFR BLD: 58.8 % (ref 38–73)
NRBC BLD-RTO: 0 /100 WBC
PLATELET # BLD AUTO: 305 K/UL (ref 150–450)
PMV BLD AUTO: 9.6 FL (ref 9.2–12.9)
RBC # BLD AUTO: 4.9 M/UL (ref 4–5.4)
WBC # BLD AUTO: 7.01 K/UL (ref 3.9–12.7)

## 2021-12-21 PROCEDURE — 82784 ASSAY IGA/IGD/IGG/IGM EACH: CPT | Mod: 59 | Performed by: ALLERGY & IMMUNOLOGY

## 2021-12-21 PROCEDURE — 86360 T CELL ABSOLUTE COUNT/RATIO: CPT | Performed by: ALLERGY & IMMUNOLOGY

## 2021-12-21 PROCEDURE — 99999 PR PBB SHADOW E&M-EST. PATIENT-LVL IV: ICD-10-PCS | Mod: PBBFAC,,, | Performed by: ALLERGY & IMMUNOLOGY

## 2021-12-21 PROCEDURE — 82784 ASSAY IGA/IGD/IGG/IGM EACH: CPT | Performed by: ALLERGY & IMMUNOLOGY

## 2021-12-21 PROCEDURE — 82787 IGG 1 2 3 OR 4 EACH: CPT | Mod: 59 | Performed by: ALLERGY & IMMUNOLOGY

## 2021-12-21 PROCEDURE — 99244 OFF/OP CNSLTJ NEW/EST MOD 40: CPT | Mod: S$GLB,,, | Performed by: ALLERGY & IMMUNOLOGY

## 2021-12-21 PROCEDURE — 86003 ALLG SPEC IGE CRUDE XTRC EA: CPT | Mod: 59 | Performed by: ALLERGY & IMMUNOLOGY

## 2021-12-21 PROCEDURE — 85025 COMPLETE CBC W/AUTO DIFF WBC: CPT | Performed by: ALLERGY & IMMUNOLOGY

## 2021-12-21 PROCEDURE — 86003 ALLG SPEC IGE CRUDE XTRC EA: CPT | Performed by: ALLERGY & IMMUNOLOGY

## 2021-12-21 PROCEDURE — 86359 T CELLS TOTAL COUNT: CPT | Performed by: ALLERGY & IMMUNOLOGY

## 2021-12-21 PROCEDURE — 82785 ASSAY OF IGE: CPT | Performed by: ALLERGY & IMMUNOLOGY

## 2021-12-21 PROCEDURE — 36415 COLL VENOUS BLD VENIPUNCTURE: CPT | Mod: PO | Performed by: ALLERGY & IMMUNOLOGY

## 2021-12-21 PROCEDURE — 99999 PR PBB SHADOW E&M-EST. PATIENT-LVL IV: CPT | Mod: PBBFAC,,, | Performed by: ALLERGY & IMMUNOLOGY

## 2021-12-21 PROCEDURE — 86684 HEMOPHILUS INFLUENZA ANTIBDY: CPT | Performed by: ALLERGY & IMMUNOLOGY

## 2021-12-21 PROCEDURE — 99244 PR OFFICE CONSULTATION,LEVEL IV: ICD-10-PCS | Mod: S$GLB,,, | Performed by: ALLERGY & IMMUNOLOGY

## 2021-12-21 PROCEDURE — 86355 B CELLS TOTAL COUNT: CPT | Performed by: ALLERGY & IMMUNOLOGY

## 2021-12-21 PROCEDURE — 86357 NK CELLS TOTAL COUNT: CPT | Performed by: ALLERGY & IMMUNOLOGY

## 2021-12-22 LAB
CD3+CD4+ CELLS # BLD: 1392 CELLS/UL (ref 300–1400)
CD3+CD4+ CELLS NFR BLD: 60.4 % (ref 28–57)
LYMPHOCYTES NFR CSF MANUAL: 11 % (ref 6–19)
LYMPHOCYTES NFR CSF MANUAL: 143 CELLS/UL (ref 90–600)
LYMPHOCYTES NFR CSF MANUAL: 1878 CELLS/UL (ref 700–2100)
LYMPHOCYTES NFR CSF MANUAL: 19.3 % (ref 10–39)
LYMPHOCYTES NFR CSF MANUAL: 241 CELLS/UL (ref 100–500)
LYMPHOCYTES NFR CSF MANUAL: 3.13 % (ref 0.9–3.6)
LYMPHOCYTES NFR CSF MANUAL: 445 CELLS/UL (ref 200–900)
LYMPHOCYTES NFR CSF MANUAL: 6.5 % (ref 7–31)
LYMPHOCYTES NFR CSF MANUAL: 81.5 % (ref 55–83)

## 2021-12-24 LAB
IGG1 SER-MCNC: 342 MG/DL (ref 382–929)
IGG2 SER-MCNC: 451 MG/DL (ref 242–700)
IGG3 SER-MCNC: 33 MG/DL (ref 22–176)
IGG4 SER-MCNC: 62 MG/DL (ref 4–86)

## 2021-12-27 LAB
A ALTERNATA IGE QN: <0.1 KU/L
A FUMIGATUS IGE QN: <0.1 KU/L
ALLERGEN CHAETOMIUM GLOBOSUM IGE: <0.1 KU/L
ALLERGEN WALNUT TREE IGE: <0.1 KU/L
ALLERGEN WHITE PINE TREE IGE: <0.1 KU/L
BAHIA GRASS IGE QN: <0.1 KU/L
BALD CYPRESS IGE QN: <0.1 KU/L
BERMUDA GRASS IGE QN: <0.1 KU/L
C HERBARUM IGE QN: <0.1 KU/L
C LUNATA IGE QN: <0.1 KU/L
CAT DANDER IGE QN: 1.99 KU/L
CHAETOMIUM GLOB. CLASS: NORMAL
COMMON RAGWEED IGE QN: <0.1 KU/L
COTTONWOOD IGE QN: <0.1 KU/L
D FARINAE IGE QN: 4.68 KU/L
D PTERONYSS IGE QN: 1.26 KU/L
DEPRECATED A ALTERNATA IGE RAST QL: NORMAL
DEPRECATED A FUMIGATUS IGE RAST QL: NORMAL
DEPRECATED BAHIA GRASS IGE RAST QL: NORMAL
DEPRECATED BALD CYPRESS IGE RAST QL: NORMAL
DEPRECATED BERMUDA GRASS IGE RAST QL: NORMAL
DEPRECATED C HERBARUM IGE RAST QL: NORMAL
DEPRECATED C LUNATA IGE RAST QL: NORMAL
DEPRECATED CAT DANDER IGE RAST QL: ABNORMAL
DEPRECATED COMMON RAGWEED IGE RAST QL: NORMAL
DEPRECATED COTTONWOOD IGE RAST QL: NORMAL
DEPRECATED D FARINAE IGE RAST QL: ABNORMAL
DEPRECATED D PTERONYSS IGE RAST QL: ABNORMAL
DEPRECATED DOG DANDER IGE RAST QL: ABNORMAL
DEPRECATED ELDER IGE RAST QL: NORMAL
DEPRECATED ENGL PLANTAIN IGE RAST QL: NORMAL
DEPRECATED JOHNSON GRASS IGE RAST QL: NORMAL
DEPRECATED LONDON PLANE IGE RAST QL: NORMAL
DEPRECATED MUGWORT IGE RAST QL: NORMAL
DEPRECATED P NOTATUM IGE RAST QL: NORMAL
DEPRECATED PECAN/HICK TREE IGE RAST QL: NORMAL
DEPRECATED ROACH IGE RAST QL: NORMAL
DEPRECATED S ROSTRATA IGE RAST QL: NORMAL
DEPRECATED SALTWORT IGE RAST QL: NORMAL
DEPRECATED SILVER BIRCH IGE RAST QL: NORMAL
DEPRECATED TIMOTHY IGE RAST QL: NORMAL
DEPRECATED WHITE OAK IGE RAST QL: NORMAL
DEPRECATED WILLOW IGE RAST QL: NORMAL
DOG DANDER IGE QN: 0.22 KU/L
ELDER IGE QN: <0.1 KU/L
ENGL PLANTAIN IGE QN: <0.1 KU/L
JOHNSON GRASS IGE QN: <0.1 KU/L
LONDON PLANE IGE QN: <0.1 KU/L
MUGWORT IGE QN: <0.1 KU/L
P NOTATUM IGE QN: <0.1 KU/L
PECAN/HICK TREE IGE QN: <0.1 KU/L
ROACH IGE QN: <0.1 KU/L
S ROSTRATA IGE QN: <0.1 KU/L
SALTWORT IGE QN: <0.1 KU/L
SILVER BIRCH IGE QN: <0.1 KU/L
TIMOTHY IGE QN: <0.1 KU/L
WALNUT TREE CLASS: NORMAL
WHITE OAK IGE QN: <0.1 KU/L
WHITE PINE CLASS: NORMAL
WILLOW IGE QN: <0.1 KU/L

## 2021-12-28 LAB
C DIPHTHERIAE AB SER IA-ACNC: 0.25 IU/ML
C TETANI TOXOID AB SER-ACNC: 1.64 IU/ML
DEPRECATED S PNEUM23 IGG SER-MCNC: <0.3 UG/ML
DEPRECATED S PNEUM4 IGG SER-MCNC: <0.3 UG/ML
HAEM INFLU B IGG SER IA-MCNC: <0.15 MCG/ML
S PN DA SERO 19F IGG SER-MCNC: 2.3 UG/ML
S PNEUM DA 1 IGG SER-MCNC: 1.4 UG/ML
S PNEUM DA 14 IGG SER-MCNC: 7.6
S PNEUM DA 18C IGG SER-MCNC: 0.8
S PNEUM DA 19A IGG SER-MCNC: 0.7 UG/ML
S PNEUM DA 3 IGG SER-MCNC: <0.3 UG/ML
S PNEUM DA 5 IGG SER-MCNC: 1.9 UG/ML
S PNEUM DA 6A IGG SER-MCNC: <0.3 UG/ML
S PNEUM DA 6B IGG SER-MCNC: 0.7 UG/ML
S PNEUM DA 7F IGG SER-MCNC: 0.3 UG/ML
S PNEUM DA 9V IGG SER-MCNC: <0.3 UG/ML

## 2021-12-29 ENCOUNTER — PATIENT MESSAGE (OUTPATIENT)
Dept: ALLERGY | Facility: CLINIC | Age: 56
End: 2021-12-29
Payer: COMMERCIAL

## 2022-01-31 ENCOUNTER — OFFICE VISIT (OUTPATIENT)
Dept: OTOLARYNGOLOGY | Facility: CLINIC | Age: 57
End: 2022-01-31
Payer: COMMERCIAL

## 2022-01-31 ENCOUNTER — PATIENT MESSAGE (OUTPATIENT)
Dept: SURGERY | Facility: HOSPITAL | Age: 57
End: 2022-01-31
Payer: COMMERCIAL

## 2022-01-31 VITALS — DIASTOLIC BLOOD PRESSURE: 85 MMHG | SYSTOLIC BLOOD PRESSURE: 128 MMHG | HEART RATE: 83 BPM | TEMPERATURE: 98 F

## 2022-01-31 DIAGNOSIS — J39.8 TRACHEAL STENOSIS: Primary | ICD-10-CM

## 2022-01-31 LAB
CTP QC/QA: YES
SARS-COV-2 AG RESP QL IA.RAPID: NEGATIVE

## 2022-01-31 PROCEDURE — 1160F PR REVIEW ALL MEDS BY PRESCRIBER/CLIN PHARMACIST DOCUMENTED: ICD-10-PCS | Mod: CPTII,S$GLB,, | Performed by: OTOLARYNGOLOGY

## 2022-01-31 PROCEDURE — 3074F PR MOST RECENT SYSTOLIC BLOOD PRESSURE < 130 MM HG: ICD-10-PCS | Mod: CPTII,S$GLB,, | Performed by: OTOLARYNGOLOGY

## 2022-01-31 PROCEDURE — 3074F SYST BP LT 130 MM HG: CPT | Mod: CPTII,S$GLB,, | Performed by: OTOLARYNGOLOGY

## 2022-01-31 PROCEDURE — 31575 PR LARYNGOSCOPY, FLEXIBLE; DIAGNOSTIC: ICD-10-PCS | Mod: S$GLB,,, | Performed by: OTOLARYNGOLOGY

## 2022-01-31 PROCEDURE — 3079F DIAST BP 80-89 MM HG: CPT | Mod: CPTII,S$GLB,, | Performed by: OTOLARYNGOLOGY

## 2022-01-31 PROCEDURE — 1159F MED LIST DOCD IN RCRD: CPT | Mod: CPTII,S$GLB,, | Performed by: OTOLARYNGOLOGY

## 2022-01-31 PROCEDURE — 99244 OFF/OP CNSLTJ NEW/EST MOD 40: CPT | Mod: 25,S$GLB,, | Performed by: OTOLARYNGOLOGY

## 2022-01-31 PROCEDURE — 1160F RVW MEDS BY RX/DR IN RCRD: CPT | Mod: CPTII,S$GLB,, | Performed by: OTOLARYNGOLOGY

## 2022-01-31 PROCEDURE — 99999 PR PBB SHADOW E&M-EST. PATIENT-LVL V: ICD-10-PCS | Mod: PBBFAC,,, | Performed by: OTOLARYNGOLOGY

## 2022-01-31 PROCEDURE — 31575 DIAGNOSTIC LARYNGOSCOPY: CPT | Mod: S$GLB,,, | Performed by: OTOLARYNGOLOGY

## 2022-01-31 PROCEDURE — 99999 PR PBB SHADOW E&M-EST. PATIENT-LVL V: CPT | Mod: PBBFAC,,, | Performed by: OTOLARYNGOLOGY

## 2022-01-31 PROCEDURE — 87811 SARS-COV-2 COVID19 W/OPTIC: CPT | Mod: S$GLB,,, | Performed by: OTOLARYNGOLOGY

## 2022-01-31 PROCEDURE — 87811 SARS CORONAVIRUS 2 ANTIGEN POCT, MANUAL READ: ICD-10-PCS | Mod: S$GLB,,, | Performed by: OTOLARYNGOLOGY

## 2022-01-31 PROCEDURE — 3079F PR MOST RECENT DIASTOLIC BLOOD PRESSURE 80-89 MM HG: ICD-10-PCS | Mod: CPTII,S$GLB,, | Performed by: OTOLARYNGOLOGY

## 2022-01-31 PROCEDURE — 99244 PR OFFICE CONSULTATION,LEVEL IV: ICD-10-PCS | Mod: 25,S$GLB,, | Performed by: OTOLARYNGOLOGY

## 2022-01-31 PROCEDURE — 1159F PR MEDICATION LIST DOCUMENTED IN MEDICAL RECORD: ICD-10-PCS | Mod: CPTII,S$GLB,, | Performed by: OTOLARYNGOLOGY

## 2022-01-31 RX ORDER — LIDOCAINE HYDROCHLORIDE 10 MG/ML
1 INJECTION, SOLUTION EPIDURAL; INFILTRATION; INTRACAUDAL; PERINEURAL ONCE
Status: CANCELLED | OUTPATIENT
Start: 2022-01-31 | End: 2022-01-31

## 2022-01-31 RX ORDER — DEXAMETHASONE SODIUM PHOSPHATE 4 MG/ML
8 INJECTION, SOLUTION INTRA-ARTICULAR; INTRALESIONAL; INTRAMUSCULAR; INTRAVENOUS; SOFT TISSUE
Status: CANCELLED | OUTPATIENT
Start: 2022-01-31

## 2022-01-31 NOTE — PROGRESS NOTES
"[facility]  Department of Otorhinolaryngology and Communication Sciences    Natasha Espinal is a 56 y.o. female who presents to the [facility] for consultation at the kind request of Dr. Porter Castaneda for further management of tracheostomy.     She complains of mild dyspnea and wheezing.  She has tracheal stenosis, ultimately identified in May 2018 when she was found to be a very difficult intubation for breast reconstruction surgery. She was connected to Dr. Ridley, who brought her for endoscopic airway intervention in Sept 2018. She had done well since then but was lost to follow up due to high out of pocket costs with her insurance plan.     Around last summer, she noticed exertional dyspnea an noisy breathing when she was trying to hike in the mountains. Since then she has been aware of mild exertional dyspnea, phonatory dyspnea, and mild inspiratory airway noise. Symptoms are stable and not deteriorating. Symptoms are nonresponsive to bronchodilator.    She had PFTs with pulm recently  FVL 11/29/21  Truncation and flattening of expiratory flow volume loop; some truncation of inspiratory loop  "No convincing evidence of reactive airway disease. No bronchodilator response. PFT not consistent with asthma."    Has a prior longstanding history of bad sinus problems, needed sinus surgery  No personal or family history of rheumatologic disorders.    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    Dyspnea Index Score 1/25/2022   Dyspnea Index Total Score  20     Cough Severity Index Scores  1/25/2022   CSI Total Score 20       Past Medical History  She has a past medical history of Allergy, Asthma, Breast cancer, and Hypertension.    Past Surgical History  She has a past surgical history that includes Hysterectomy (2011); Oophorectomy; deviated septum repair; Mastectomy (Bilateral, 02/2018); Breast biopsy (Left, 12/2017); Breast biopsy (Left, 1983); Breast reconstruction (Bilateral); scar tissue removal in " airway  (Bilateral, 08/01/2018); Knee surgery (Left, 01/06/2020); and Sinus surgery.    Family History  Her family history includes Arthritis in her father and mother; Asthma in her brother and mother; Breast cancer (age of onset: 43) in her paternal aunt; Breast cancer (age of onset: 55) in her paternal aunt; Breast cancer (age of onset: 62) in her paternal aunt; COPD in her father; Colon cancer in her paternal uncle; Hearing loss in her father; Heart disease in her father.    Social History  She reports that she has never smoked. She has never used smokeless tobacco. She reports that she does not drink alcohol and does not use drugs.    Allergies  She is allergic to naproxen and sulfa (sulfonamide antibiotics).    Medications  She has a current medication list which includes the following prescription(s): amlodipine, anastrozole, ascorbate calcium (vitamin c), aspirin, cetirizine, cholecalciferol (vitamin d3), famotidine, fish oil-omega-3 fatty acids, fluticasone propionate, hydroxyzine hcl, melatonin, multivit-iron-min-folic acid, multivitamin/iron/folic acid, olmesartan-hydrochlorothiazide, intrarosa, trazodone, and albuterol.    Review of Systems   Constitutional: Negative.  Negative for fever.   HENT: Positive for postnasal drip, sinus pressure, trouble swallowing and voice change. Negative for sore throat.    Eyes: Negative.  Negative for visual disturbance.   Respiratory: Positive for cough, shortness of breath and wheezing.    Cardiovascular: Negative.  Negative for chest pain.   Gastrointestinal: Negative.  Negative for nausea.   Endocrine: Negative.    Genitourinary: Negative.    Musculoskeletal: Negative.  Negative for arthralgias.   Skin: Negative.  Negative for rash.   Neurological: Negative.  Negative for tremors.   Hematological: Negative.  Does not bruise/bleed easily.   Psychiatric/Behavioral: Negative.  The patient is not nervous/anxious.           Objective:     /85   Pulse 83   Temp 98.2  °F (36.8 °C)      Physical Exam  Constitutional: comfortable, well dressed  Psychiatric: appropriate affect  Respiratory: comfortably breathing, symmetric chest rise, no stridor  Voice: faint inconsistent inspiratory stridor  Cardiovascular: upper extremities non-edematous  Lymphatic: no cervical lymphadenopathy  Neurologic: alert and oriented to time, place, person, and situation; cranial nerves 3-12 grossly intact  Head: normocephalic  Eyes: conjunctivae and sclerae clear  Ears: normal pinnae, normal external auditory canals, tympanic membranes intact  Nose: mucosa pink and noncongested, no masses, no mucopurulence, no polyps  Oral cavity / oropharynx: no mucosal lesions  Neck: soft, full range of motion, laryngotracheal complex palpable with appropriate landmarks, larynx elevates on swallowing  Indirect laryngoscopy: limited due to gag    Procedure  Flexible Laryngoscopy (51919): Laryngoscopy is indicated for assessment of upper aerodigestive structure and function. This was carried out transnasally with a distal chip videoendoscope. After verbal consent was obtained, the patient was positioned and the nose was topically decongested with 1% phenylephrine and topically anesthetized with 4% lidocaine. The endoscope was passed through the most patent nasal cavity and positioned to image the nasopharynx, larynx, and hypopharynx in detail. The following features were examined: nasopharyngeal, laryngeal, hypopharyngeal masses; velopharyngeal strength, closure, and symmetry of motion; vocal fold range and symmetry of motion; laryngeal mucosal edema, erythema, inflammation, and hydration; salivary pooling; and gross laryngeal sensation. The equipment was removed. The patient tolerated the procedure well without complication. All findings were normal except:  - tracheal stenosis, approximately 70% narrowed, appears to be primarily an anteriorly based shelf      Assessment:     Natasha Espinal is a 56 y.o. female  with chronic tracheal stenosis, suspected to be idiopathic.       Plan:        I had a discussion with the patient regarding her condition and the further workup and management options.      I recommended she get ANCA labs to screen for GPA.    Further management would entail additional endoscopic airway intervention--CO2 laser incision/excision, balloon dilation, steroid injection. I spoke with her regarding the risks/benefits thereof, including risks of airway problems requiring intubation or tracheostomy, tracheal rupture, aerodigestive fistula, hoarseness. I gave the patient the opportunity to ask questions and I answered all of them. She wishes to proceed. We will arrange this in the coming weeks, with preop testing triage to be completed by the anesthesiology team. Overnight observation is anticipated.    All questions were answered, and the patient is in agreement with the above.     Mikey Galvez M.D.  [facility]  Department of Otorhinolaryngology and Communication Sciences

## 2022-01-31 NOTE — H&P (VIEW-ONLY)
"[facility]  Department of Otorhinolaryngology and Communication Sciences    Natasha Espinal is a 56 y.o. female who presents to the [facility] for consultation at the kind request of Dr. Porter Castaneda for further management of tracheostomy.     She complains of mild dyspnea and wheezing.  She has tracheal stenosis, ultimately identified in May 2018 when she was found to be a very difficult intubation for breast reconstruction surgery. She was connected to Dr. Ridley, who brought her for endoscopic airway intervention in Sept 2018. She had done well since then but was lost to follow up due to high out of pocket costs with her insurance plan.     Around last summer, she noticed exertional dyspnea an noisy breathing when she was trying to hike in the mountains. Since then she has been aware of mild exertional dyspnea, phonatory dyspnea, and mild inspiratory airway noise. Symptoms are stable and not deteriorating. Symptoms are nonresponsive to bronchodilator.    She had PFTs with pulm recently  FVL 11/29/21  Truncation and flattening of expiratory flow volume loop; some truncation of inspiratory loop  "No convincing evidence of reactive airway disease. No bronchodilator response. PFT not consistent with asthma."    Has a prior longstanding history of bad sinus problems, needed sinus surgery  No personal or family history of rheumatologic disorders.    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    Dyspnea Index Score 1/25/2022   Dyspnea Index Total Score  20     Cough Severity Index Scores  1/25/2022   CSI Total Score 20       Past Medical History  She has a past medical history of Allergy, Asthma, Breast cancer, and Hypertension.    Past Surgical History  She has a past surgical history that includes Hysterectomy (2011); Oophorectomy; deviated septum repair; Mastectomy (Bilateral, 02/2018); Breast biopsy (Left, 12/2017); Breast biopsy (Left, 1983); Breast reconstruction (Bilateral); scar tissue removal in " airway  (Bilateral, 08/01/2018); Knee surgery (Left, 01/06/2020); and Sinus surgery.    Family History  Her family history includes Arthritis in her father and mother; Asthma in her brother and mother; Breast cancer (age of onset: 43) in her paternal aunt; Breast cancer (age of onset: 55) in her paternal aunt; Breast cancer (age of onset: 62) in her paternal aunt; COPD in her father; Colon cancer in her paternal uncle; Hearing loss in her father; Heart disease in her father.    Social History  She reports that she has never smoked. She has never used smokeless tobacco. She reports that she does not drink alcohol and does not use drugs.    Allergies  She is allergic to naproxen and sulfa (sulfonamide antibiotics).    Medications  She has a current medication list which includes the following prescription(s): amlodipine, anastrozole, ascorbate calcium (vitamin c), aspirin, cetirizine, cholecalciferol (vitamin d3), famotidine, fish oil-omega-3 fatty acids, fluticasone propionate, hydroxyzine hcl, melatonin, multivit-iron-min-folic acid, multivitamin/iron/folic acid, olmesartan-hydrochlorothiazide, intrarosa, trazodone, and albuterol.    Review of Systems   Constitutional: Negative.  Negative for fever.   HENT: Positive for postnasal drip, sinus pressure, trouble swallowing and voice change. Negative for sore throat.    Eyes: Negative.  Negative for visual disturbance.   Respiratory: Positive for cough, shortness of breath and wheezing.    Cardiovascular: Negative.  Negative for chest pain.   Gastrointestinal: Negative.  Negative for nausea.   Endocrine: Negative.    Genitourinary: Negative.    Musculoskeletal: Negative.  Negative for arthralgias.   Skin: Negative.  Negative for rash.   Neurological: Negative.  Negative for tremors.   Hematological: Negative.  Does not bruise/bleed easily.   Psychiatric/Behavioral: Negative.  The patient is not nervous/anxious.           Objective:     /85   Pulse 83   Temp 98.2  °F (36.8 °C)      Physical Exam  Constitutional: comfortable, well dressed  Psychiatric: appropriate affect  Respiratory: comfortably breathing, symmetric chest rise, no stridor  Voice: faint inconsistent inspiratory stridor  Cardiovascular: upper extremities non-edematous  Lymphatic: no cervical lymphadenopathy  Neurologic: alert and oriented to time, place, person, and situation; cranial nerves 3-12 grossly intact  Head: normocephalic  Eyes: conjunctivae and sclerae clear  Ears: normal pinnae, normal external auditory canals, tympanic membranes intact  Nose: mucosa pink and noncongested, no masses, no mucopurulence, no polyps  Oral cavity / oropharynx: no mucosal lesions  Neck: soft, full range of motion, laryngotracheal complex palpable with appropriate landmarks, larynx elevates on swallowing  Indirect laryngoscopy: limited due to gag    Procedure  Flexible Laryngoscopy (88498): Laryngoscopy is indicated for assessment of upper aerodigestive structure and function. This was carried out transnasally with a distal chip videoendoscope. After verbal consent was obtained, the patient was positioned and the nose was topically decongested with 1% phenylephrine and topically anesthetized with 4% lidocaine. The endoscope was passed through the most patent nasal cavity and positioned to image the nasopharynx, larynx, and hypopharynx in detail. The following features were examined: nasopharyngeal, laryngeal, hypopharyngeal masses; velopharyngeal strength, closure, and symmetry of motion; vocal fold range and symmetry of motion; laryngeal mucosal edema, erythema, inflammation, and hydration; salivary pooling; and gross laryngeal sensation. The equipment was removed. The patient tolerated the procedure well without complication. All findings were normal except:  - tracheal stenosis, approximately 70% narrowed, appears to be primarily an anteriorly based shelf      Assessment:     Natasha Espinal is a 56 y.o. female  with chronic tracheal stenosis, suspected to be idiopathic.       Plan:        I had a discussion with the patient regarding her condition and the further workup and management options.      I recommended she get ANCA labs to screen for GPA.    Further management would entail additional endoscopic airway intervention--CO2 laser incision/excision, balloon dilation, steroid injection. I spoke with her regarding the risks/benefits thereof, including risks of airway problems requiring intubation or tracheostomy, tracheal rupture, aerodigestive fistula, hoarseness. I gave the patient the opportunity to ask questions and I answered all of them. She wishes to proceed. We will arrange this in the coming weeks, with preop testing triage to be completed by the anesthesiology team. Overnight observation is anticipated.    All questions were answered, and the patient is in agreement with the above.     Mikey Galvez M.D.  [facility]  Department of Otorhinolaryngology and Communication Sciences

## 2022-01-31 NOTE — Clinical Note
Can you let her know I dropped her case in for Feb 17th? And can you also please set her up to have an ANCA lab panel drawn? Thanks.

## 2022-02-14 ENCOUNTER — TELEPHONE (OUTPATIENT)
Dept: PULMONOLOGY | Facility: CLINIC | Age: 57
End: 2022-02-14
Payer: COMMERCIAL

## 2022-02-14 ENCOUNTER — TELEPHONE (OUTPATIENT)
Dept: PREADMISSION TESTING | Facility: HOSPITAL | Age: 57
End: 2022-02-14
Payer: COMMERCIAL

## 2022-02-14 NOTE — ANESTHESIA PAT ROS NOTE
02/14/2022  Natasha Espinal is a 56 y.o., female.      Pre-op Assessment          Review of Systems  Anesthesia Hx:  Hx of Anesthetic complications PONV. DIFFICULT INTUBATION.     Sent from Dr. Gentile for h/o difficult airway during endotracheal intubation in May 2018 (revision breast reconstruction) where they had to place a size 6 ETT. Had double mastectomy in Feb 2018 and patient reports no problems with intubation at that time.  Onesimo Ridley MD   Denies Family Hx of Anesthesia complications.  Personal Hx of Anesthesia complications, Post-Operative Nausea/Vomiting, with every anesthetic, treatment not known  Difficult Intubation   Social:  Non-Smoker, Social Alcohol Use    Hematology/Oncology:  Hematology Normal       -- Transfusion: -- Transfusion Hx (no complications):  --  Cancer in past history:  Breast bilateral surgery  Oncology Comments: HX BREAST CANCER 2018- S/P BILATERAL MASTECTOMY AND 2 RECONSTRUCTION SURGERIES.    **LIMB ALERT-LEFT ARM**     EENT/Dental:  EENT/Dental Normal HX SINUS SURGERY FOR CHRONIC SINUSITIS AND DEVIATED SEPTUM   Cardiovascular:   Hypertension  Denies Angina. CROCKETT  Functional Capacity good / => 4 METS    Pulmonary:   Denies Shortness of breath.  Denies Recent URI. TRACHEAL STENOSIS   Renal/:  Renal/ Normal     Hepatic/GI:   GERD    Musculoskeletal:   HX LEFT KNEE ARTHROSCOPY TORN MENISCUS   Neurological:  Neurology Normal    Endocrine:  Metabolic Disorders, Obesity / BMI > 30  Psych:  Psychiatric Normal           Physical Exam  General:  Obesity    Airway/Jaw/Neck:  Airway Findings: Mouth Opening: Normal Tongue: Large  Jaw/Neck Findings:  Neck ROM: Normal ROM      Dental:  Dental Findings: In tact   Chest/Lungs:  Chest/Lungs Findings: Clear to auscultation     Heart/Vascular:  Heart Findings: Rate: Normal  Rhythm: Regular Rhythm  Sounds: Normal       "  Mental Status:  Mental Status Findings:  Cooperative, Alert and Oriented         Anesthesia Assessment: Preoperative EQUATION    Planned Procedure: Procedure(s) (LRB):  Suspension microlaryngoscopy, CO2 laser excision, steroid injection, balloon dilation (N/A)  Requested Anesthesia Type:General  Surgeon: Mikey Galvez MD  Service: ENT  Known or anticipated Date of Surgery:2/17/2022    Surgeon notes: reviewed    Electronic QUestionnaire Assessment completed via nurse interview with patient.        Triage considerations:     The patient has no apparent active cardiac condition (No unstable coronary Syndrome such as severe unstable angina or recent [<1 month] myocardial infarction, decompensated CHF, severe valvular   disease or significant arrhythmia)    Previous anesthesia records:PREVIOUS ANESTHESIA RECORDS NOT AVAILABLE. HX OF PONV & DIFFICULT INTUBATION PER ENT NOTES IN CARE EVERYWHERE     Sent from Dr. Gentile for h/o difficult airway during endotracheal intubation in May 2018 (revision breast reconstruction) where they had to place a size 6 ETT. Had double mastectomy in Feb 2018 and patient reports no problems with intubation at that time.  Onesimo Ridley MD        Last PCP note: 3-6 months ago , within [facility]   Subspecialty notes: Allergy, ENT, Hematology/Oncology, Pulmonary    Other important co-morbidities: GERD, HTN, Obesity and CHRONIC TRACHEAL STENOSIS, HX OF DIFFICULT INTUBATION, HX OF "SINUS SURGERY" AT [facility] IN 1998      Tests already available:  Available tests,  within 3 months , 3-6 months ago , within [facility] .     12/21/21  ALLERGY TESTS    11/9/21  T4  TSH  LIPID PANEL  CMP  CBC             Instructions given. (See in Nurse's note)    Optimization:  Anesthesia Preop Clinic Assessment  Indicated - PER DR. GALVEZ    Medical Opinion Indicated: [facility] PULMONARY             Plan:    Testing:  BMP and EKG    Consultations: PATIENT'S PULMONOLOGIST FOR STATEMENT OF OPTIMIZATION   " Pre-anesthesia  visit       Visit focus: airway concerns, PER SURGEON'S REQUEST         Patient  has previously scheduled Medical Appointment: NOT AT THIS TIME    Navigation: Tests Scheduled.              Consults scheduled.             Results will be tracked by Preop Clinic.      2/16/2022: PULMONARY- DR. DARLIN TOUSSAINT    Pre operative evaluation:   Intermediate Risk from pulmonary point of view (per ARISCAT Score), but no absolute contraindication for surgical procedure at this time. Currently with no new major respiratory complaints.                   In order to reduce surgical complication patient should:              1-Use incentive spirometry pre and post surgery              2-Limit sedatives or opioids, if possible after surgery.              3-DVT prophylaxis post surgical procedure, as needed              4-Bronchodilators as needed.              5-Close monitoring pre and post surgery in order to evaluate for desaturation as well as mental status change which could be related to acute hypercapnia.              6- Consider continuous capnometry monitoring during procedure and while sedation is given and in the recovery period.

## 2022-02-14 NOTE — TELEPHONE ENCOUNTER
----- Message from Cynthia Perdue RN sent at 2/11/2022  4:51 PM CST -----  2/17 POC PER DR. LEE/ LAB/EKG

## 2022-02-16 ENCOUNTER — OFFICE VISIT (OUTPATIENT)
Dept: PULMONOLOGY | Facility: CLINIC | Age: 57
End: 2022-02-16
Payer: COMMERCIAL

## 2022-02-16 ENCOUNTER — ANESTHESIA EVENT (OUTPATIENT)
Dept: SURGERY | Facility: HOSPITAL | Age: 57
End: 2022-02-16
Payer: COMMERCIAL

## 2022-02-16 ENCOUNTER — PATIENT MESSAGE (OUTPATIENT)
Dept: OTOLARYNGOLOGY | Facility: CLINIC | Age: 57
End: 2022-02-16
Payer: COMMERCIAL

## 2022-02-16 ENCOUNTER — HOSPITAL ENCOUNTER (OUTPATIENT)
Dept: PREADMISSION TESTING | Facility: HOSPITAL | Age: 57
Discharge: HOME | End: 2022-02-16
Attending: OTOLARYNGOLOGY
Payer: COMMERCIAL

## 2022-02-16 ENCOUNTER — TELEPHONE (OUTPATIENT)
Dept: OTOLARYNGOLOGY | Facility: CLINIC | Age: 57
End: 2022-02-16
Payer: COMMERCIAL

## 2022-02-16 ENCOUNTER — HOSPITAL ENCOUNTER (OUTPATIENT)
Dept: CARDIOLOGY | Facility: CLINIC | Age: 57
Discharge: HOME | End: 2022-02-16
Payer: COMMERCIAL

## 2022-02-16 VITALS
HEART RATE: 89 BPM | WEIGHT: 170 LBS | HEIGHT: 59 IN | DIASTOLIC BLOOD PRESSURE: 77 MMHG | RESPIRATION RATE: 16 BRPM | TEMPERATURE: 98 F | BODY MASS INDEX: 34.27 KG/M2 | SYSTOLIC BLOOD PRESSURE: 137 MMHG | OXYGEN SATURATION: 96 %

## 2022-02-16 VITALS
BODY MASS INDEX: 59.07 KG/M2 | DIASTOLIC BLOOD PRESSURE: 82 MMHG | OXYGEN SATURATION: 96 % | SYSTOLIC BLOOD PRESSURE: 120 MMHG | WEIGHT: 293 LBS | HEIGHT: 59 IN | HEART RATE: 98 BPM

## 2022-02-16 DIAGNOSIS — J31.0 CHRONIC RHINITIS: ICD-10-CM

## 2022-02-16 DIAGNOSIS — J39.8 TRACHEAL STENOSIS: Primary | ICD-10-CM

## 2022-02-16 DIAGNOSIS — Z01.818 PRE-OP TESTING: ICD-10-CM

## 2022-02-16 DIAGNOSIS — R06.09 DYSPNEA ON EXERTION: ICD-10-CM

## 2022-02-16 PROCEDURE — 99999 PR PBB SHADOW E&M-EST. PATIENT-LVL IV: CPT | Mod: PBBFAC,,, | Performed by: INTERNAL MEDICINE

## 2022-02-16 PROCEDURE — 99999 PR PBB SHADOW E&M-EST. PATIENT-LVL IV: ICD-10-PCS | Mod: PBBFAC,,, | Performed by: INTERNAL MEDICINE

## 2022-02-16 PROCEDURE — 1160F RVW MEDS BY RX/DR IN RCRD: CPT | Mod: CPTII,S$GLB,, | Performed by: INTERNAL MEDICINE

## 2022-02-16 PROCEDURE — 3008F BODY MASS INDEX DOCD: CPT | Mod: CPTII,S$GLB,, | Performed by: INTERNAL MEDICINE

## 2022-02-16 PROCEDURE — 1159F MED LIST DOCD IN RCRD: CPT | Mod: CPTII,S$GLB,, | Performed by: INTERNAL MEDICINE

## 2022-02-16 PROCEDURE — 3008F PR BODY MASS INDEX (BMI) DOCUMENTED: ICD-10-PCS | Mod: CPTII,S$GLB,, | Performed by: INTERNAL MEDICINE

## 2022-02-16 PROCEDURE — 3079F DIAST BP 80-89 MM HG: CPT | Mod: CPTII,S$GLB,, | Performed by: INTERNAL MEDICINE

## 2022-02-16 PROCEDURE — 3074F PR MOST RECENT SYSTOLIC BLOOD PRESSURE < 130 MM HG: ICD-10-PCS | Mod: CPTII,S$GLB,, | Performed by: INTERNAL MEDICINE

## 2022-02-16 PROCEDURE — 3079F PR MOST RECENT DIASTOLIC BLOOD PRESSURE 80-89 MM HG: ICD-10-PCS | Mod: CPTII,S$GLB,, | Performed by: INTERNAL MEDICINE

## 2022-02-16 PROCEDURE — 93010 EKG 12-LEAD: ICD-10-PCS | Mod: S$GLB,,, | Performed by: INTERNAL MEDICINE

## 2022-02-16 PROCEDURE — 93005 ELECTROCARDIOGRAM TRACING: CPT | Mod: S$GLB,,, | Performed by: ANESTHESIOLOGY

## 2022-02-16 PROCEDURE — 1159F PR MEDICATION LIST DOCUMENTED IN MEDICAL RECORD: ICD-10-PCS | Mod: CPTII,S$GLB,, | Performed by: INTERNAL MEDICINE

## 2022-02-16 PROCEDURE — 93005 EKG 12-LEAD: ICD-10-PCS | Mod: S$GLB,,, | Performed by: ANESTHESIOLOGY

## 2022-02-16 PROCEDURE — 3074F SYST BP LT 130 MM HG: CPT | Mod: CPTII,S$GLB,, | Performed by: INTERNAL MEDICINE

## 2022-02-16 PROCEDURE — 1160F PR REVIEW ALL MEDS BY PRESCRIBER/CLIN PHARMACIST DOCUMENTED: ICD-10-PCS | Mod: CPTII,S$GLB,, | Performed by: INTERNAL MEDICINE

## 2022-02-16 PROCEDURE — 99214 OFFICE O/P EST MOD 30 MIN: CPT | Mod: S$GLB,,, | Performed by: INTERNAL MEDICINE

## 2022-02-16 PROCEDURE — 93010 ELECTROCARDIOGRAM REPORT: CPT | Mod: S$GLB,,, | Performed by: INTERNAL MEDICINE

## 2022-02-16 PROCEDURE — 99214 PR OFFICE/OUTPT VISIT, EST, LEVL IV, 30-39 MIN: ICD-10-PCS | Mod: S$GLB,,, | Performed by: INTERNAL MEDICINE

## 2022-02-16 RX ORDER — AZELASTINE 1 MG/ML
1 SPRAY, METERED NASAL 2 TIMES DAILY
Qty: 30 ML | Refills: 1 | Status: SHIPPED | OUTPATIENT
Start: 2022-02-16 | End: 2022-03-11

## 2022-02-16 NOTE — PROGRESS NOTES
Subjective:       Patient ID: Natasha Espinal is a 56 y.o. female.    Chief Complaint: Pre-op Exam    HPI   This is a 56 y.o. female who has tracheal stenosis presenting to pulmonary clinic as a follow up visit, seen initially on 11/29/2022 for evaluation of asthma. She was diagnosed with asthma after a pneumonia in 2011. She has used Albuterol inhaler as needed since then. She has never been formally diagnosed with asthma.    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 She underwent incision and dilation of stenosis in September 2018 with Dr. Onesimo Ridley in [place]. Last ENT visit was on 5/28/19  For follow up for surveillance of laryngotracheal stenosis at which time, she reported hoarseness. She believes she has wheezing sound when she has hoarseness.  Pulmonary function test from 11/29/22 with evidence of truncated flow volume loop. She was referred to ENT, Dr. Galvez for evaluation and found to have 70% narrowing of tracheal stenosis. She is planned for suspension microlaryngoscopy on 2/17/22.    Her symptoms have not significant changed from last visit. She has no dyspnea at rest or when walking on flat surface, however does get winded when she climbs a flight of stairs and when she talks for a prolonged period of time. Denies any chest tightness or pain. Does have frequent awakenings at night with coughing and sensation of mucus stuck in back of throat, but not in the lung. Has chronic rhinitis, nasal stuffiness and post nasal drip. She has seasonal allergies. She takes Zyrtec daily. These symptoms have started to improve with Flonase nasal spray. She has albuterol inhaler but has not required to use it frequent and feels it does not help with her symptoms. She has used it once in the past three months.      Other PMH:  Hypertension, hyperlipidemia and  Breast Cancer on  Anastrozole.    PSH:  Chronic sinuisitis post surgery 1998  Mastectomy; breast reconstruction with revisions 2018  Incision and drainage of laryngotracheal stenosis 2018    Social History:  Never smoker; had second hand exposure when she was growing up. No illicit drug use.    Former teacher, now retired. Part-time job working with wedding arrangements.       She has had PFTs in the past.  Previous PFT found on care everywhere and today's PFT    Test          Predicted    8/14/18  10/23/18  5/28/19 11/29/21  FVC                  1.89    2.00   2.43   2.22  2.48 -> 2.29  FEV1               1.65     1.78   2.03   1.97  2.00 -> 1.86  FEV1/FVC       77.7     89.0   83.5   88.7  80  FIVC                1.89     2.47   2.52   2.09   FIV1                 1.65     2.01   2.52   1.93  FIVC/FIV1       77.7     81.4               100   92.3     PEF                 5.98     2.84   4.24   4.23  2.67  PIF                   5.98     2.37best  2.66best  2.36 best      FVL 11/29/21  Truncation and flattening of expiratory flow volume loop; some truncation of inspiratory loop    Family History:  Mother - asthma  Brother - asthma    Review of Systems   Constitutional: Negative for fever, chills, activity change, fatigue, night sweats and weakness.   HENT: Positive for postnasal drip and congestion. Negative for sinus pressure, sore throat and trouble swallowing.    Respiratory: Positive for cough and dyspnea on extertion. Negative for chest tightness, orthopnea and Paroxysmal Nocturnal Dyspnea.    Cardiovascular: Negative for chest pain, palpitations and leg swelling.   Musculoskeletal: Negative for arthralgias and back pain.   Gastrointestinal: Negative for nausea, vomiting and acid reflux.   Neurological: Negative for light-headedness and headaches.       Objective:      Physical Exam   Constitutional: She is oriented to person, place, and time. She appears well-developed. She is not obese.   HENT:   Head: Normocephalic.   Nose: No  mucosal edema.   Cardiovascular: Normal rate, regular rhythm and normal heart sounds.   No murmur heard.  Pulmonary/Chest: Normal expansion, symmetric chest wall expansion, effort normal and breath sounds normal. No stridor. No respiratory distress. She has no wheezes. She has no rales.   Abdominal: She exhibits no distension.   Musculoskeletal:         General: No edema.      Cervical back: Normal range of motion and neck supple.   Neurological: She is alert and oriented to person, place, and time.   Skin: Skin is warm.   Psychiatric: She has a normal mood and affect. Her behavior is normal. Thought content normal.         Assessment:       1. Tracheal stenosis    2. Dyspnea on exertion    3. Chronic rhinitis        Outpatient Encounter Medications as of 2/16/2022   Medication Sig Dispense Refill    amLODIPine (NORVASC) 10 MG tablet TAKE 1 TABLET BY MOUTH EVERY DAY IN THE EVENING 90 tablet 3    anastrozole (ARIMIDEX) 1 mg Tab Take 1 tablet (1 mg total) by mouth once daily. 90 tablet 2    ascorbate calcium 500 mg Tab Take 1 tablet by mouth.      aspirin (ECOTRIN) 81 MG EC tablet Take 81 mg by mouth once daily.      cetirizine (ZYRTEC) 10 MG tablet Take 10 mg by mouth once daily.      cholecalciferol, vitamin D3, (VITAMIN D3) 50 mcg (2,000 unit) Cap Take 1 capsule by mouth once daily.      famotidine (PEPCID) 20 MG tablet Take 1 tablet (20 mg total) by mouth 2 (two) times daily. 60 tablet 11    fish oil-omega-3 fatty acids 300-1,000 mg capsule Take 1 g by mouth.      fluticasone propionate (FLONASE) 50 mcg/actuation nasal spray 1 spray (50 mcg total) by Each Nostril route once daily. 11.1 mL 2    hydrOXYzine HCL (ATARAX) 25 MG tablet Take 1 tablet (25 mg total) by mouth 3 (three) times daily as needed for Itching. 60 tablet 1    melatonin 5 mg Tab Take by mouth.      MULTIVIT-IRON-MIN-FOLIC ACID 3,500-18-0.4 UNIT-MG-MG ORAL CHEW Take by mouth once daily.      multivitamin/iron/folic acid (CENTRUM WOMEN  ORAL) Take by mouth.      olmesartan-hydrochlorothiazide (BENICAR HCT) 40-12.5 mg Tab Take 1 tablet by mouth once daily. 90 tablet 3    prasterone, dhea, (INTRAROSA) 6.5 mg Inst Place 6.5 mg vaginally every evening. 30 each 3    traZODone (DESYREL) 50 MG tablet Take 1 tablet (50 mg total) by mouth nightly as needed for Insomnia. 90 tablet 3    albuterol (PROVENTIL HFA) 90 mcg/actuation inhaler Inhale 2 puffs into the lungs every 6 (six) hours as needed for Wheezing. Rescue 16 g 3    azelastine (ASTELIN) 137 mcg (0.1 %) nasal spray 1 spray (137 mcg total) by Nasal route 2 (two) times daily. 30 mL 1     No facility-administered encounter medications on file as of 2/16/2022.     No orders of the defined types were placed in this encounter.      Plan:         Tracheal stenosis:   Flow volume loop with truncated flow. Spirometry normal, FEV1 91.7%; FVC 91.5%; ratio 80%; TLC 81%; DLCO 96%.  Likely that the symptoms are related to laryngotracheal stenosis  - pending ENT surgery with Dr. Galvez tomorrow -  Suspension microlaryngoscopy, CO2 laser excision, steroid injection, balloon dilation  - can continue albuterol as needed    Pre operative evaluation:   Intermediate Risk from pulmonary point of view (per ARISCAT Score), but no absolute contraindication for surgical procedure at this time. Currently with no new major respiratory complaints.       In order to reduce surgical complication patient should:   1-Use incentive spirometry pre and post surgery   2-Limit sedatives or opioids, if possible after surgery.   3-DVT prophylaxis post surgical procedure, as needed   4-Bronchodilators as needed.   5-Close monitoring pre and post surgery in order to evaluate for desaturation as well as mental status change which could be related to acute hypercapnia.   6- Consider continuous capnometry monitoring during procedure and while sedation is given and in the recovery period.    Chronic Rhinitis/Post-nasal drip:  - Continue  Flonase  - Add azelastine nasal spray  - NETI Pot OTC  - follow up with allergist      Follow up in 3 months

## 2022-02-16 NOTE — DISCHARGE INSTRUCTIONS
Your surgery has been scheduled for:__________________________________________    You should report to:  ____[facility], located on the [place] side of the first floor of the           [facility] ([phone])  ____[facility], located on the [facility] side of the            Second floor of the [facility] ([phone])  ____3rd Floor [facility] located on the [facility] side of the [facility] [phone]  ____[facility]: located at [address] [address]. [address]     Please Note   - Tell your doctor if you take Aspirin, products containing Aspirin, herbal medications  or blood thinners, such as Coumadin, Ticlid, or Plavix.  (Consult your provider regarding holding or stopping before surgery).  - Arrange for someone to drive you home following surgery.  You will not be allowed to leave the surgical facility alone or drive yourself home following sedation and anesthesia.    Before Surgery  - Stop taking all herbal medications, vitamins, and supplements 7 days prior to surgery  - No Motrin/Advil (Ibuprofen) 7 days before surgery  - No Aleve (Naproxen) 7 days before surgery  - Stop Taking Asprin, products containing Asprin _____days before surgery  - Stop taking blood thinners_______days before surgery  - No Goody's/BC  Powder 7 days before surgery  - Refrain from drinking alcoholic beverages for 24hours before and after surgery  - Stop or limit smoking _________days before surgery  - You may take Tylenol for pain    Night before Surgery  - Do not eat or drink after midnight  - Take a shower or bath (shower is recommended).  Bathe with Hibiclens soap or an antibacterial soap from the neck down.  If not supplied by your surgeon, hibiclens soap will need to be purchased over the counter in pharmacy.  Rinse soap off thoroughly.  - Shampoo your hair with your regular  shampoo    The Day of Surgery  - Take another bath or shower with hibiclens or any antibacterial soap, to reduce the chance of infection.  - Take heart and blood pressure medications with a small sip of water, as advised by the perioperative team.  - Do not take fluid pills  - You may brush your teeth and rinse your mouth, but do not swall any additional water.   - Do not apply perfumes, powder, body lotions or deodorant on the day of surgery.  - Nail polish should be removed.  - Do not wear makeup or moisturizer  - Wear comfortable clothes, such as a button front shirt and loose fitting pants.  - Leave all jewelry, including body piercings, and valuables at home.    - Bring any devices you will neeed after surgery such as crutches or canes.  - If you have sleep apnea, please bring your CPAP machine  In the event that your physical condition changes including the onset of a cold or respiratory illness, or if you have to delay or cancel your surgery, please notify your surgeon.       Anesthesia: General Anesthesia     You are watched continuously during your procedure by your anesthesia provider.     Youre due to have surgery. During surgery, youll be given medicine called anesthesia or anesthetic. This will keep you comfortable and pain-free. Your anesthesia provider will use general anesthesia.  What is general anesthesia?  General anesthesia puts you into a state like deep sleep. It goes into the bloodstream (IV anesthetics), into the lungs (gas anesthetics), or both. You feel nothing during the procedure. You will not remember it. During the procedure, the anesthesia provider monitors you continuously. He or she checks your heart rate and rhythm, blood pressure, breathing, and blood oxygen.  1. IV anesthetics. IV anesthetics are given through an IV line in your arm. Theyre often given first. This is so you are asleep before a gas anesthetic is started. Some kinds of IV anesthetics relieve pain. Others relax  you. Your doctor will decide which kind is best in your case.  2. Gas anesthetics. Gas anesthetics are breathed into the lungs. They are often used to keep you asleep. They can be given through a facemask or a tube placed in your larynx or trachea (breathing tube).  ? If you have a facemask, your anesthesia provider will most likely place it over your nose and mouth while youre still awake. Youll breathe oxygen through the mask as your IV anesthetic is started. Gas anesthetic may be added through the mask.  ? If you have a tube in the larynx or trachea, it will be inserted into your throat after youre asleep.  Anesthesia tools and medicines  You will likely have:  · IV anesthetics. These are put into an IV line into your bloodstream.  · Gas anesthetics. You breathe these anesthetics into your lungs, where they pass into your bloodstream.  · Pulse oximeter. This is a small clip that is attached to the end of your finger. This measures your blood oxygen level.  · Electrocardiography leads (electrodes). These are small sticky pads that are placed on your chest. They record your heart rate and rhythm.  · Blood pressure cuff. This reads your blood pressure.  Risks and possible complications  General anesthesia has some risks. These include:  · Breathing problems  · Nausea and vomiting  · Sore throat or hoarseness (usually temporary)  · Allergic reaction to the anesthetic  · Irregular heartbeat (rare)  · Cardiac arrest (rare)   Anesthesia safety  1. Follow all instructions you are given for how long not to eat or drink before your procedure.  2. Be sure your doctor knows what medicines and drugs you take. This includes over-the-counter medicines, herbs, supplements, alcohol or other drugs. You will be asked when those were last taken.  3. Have an adult family member or friend drive you home after the procedure.  4. For the first 24 hours after your surgery:  ? Do not drive or use heavy equipment.  ? Do not make  important decisions or sign legal documents. If important decisions or signing legal documents is necessary during the first 24 hours after surgery, have a trusted family member or spouse act on your behalf.  ? Avoid alcohol.  ? Have a responsible adult stay with you. He or she can watch for problems and help keep you safe.  Date Last Reviewed: 12/1/2016  © 2308-8655 [employer]. [address] [place] [address], [place] [address]. All rights reserved. This information is not intended as a substitute for professional medical care. Always follow your healthcare professional's instructions.

## 2022-02-17 ENCOUNTER — ANESTHESIA (OUTPATIENT)
Dept: SURGERY | Facility: HOSPITAL | Age: 57
End: 2022-02-17
Payer: COMMERCIAL

## 2022-02-17 ENCOUNTER — HOSPITAL ENCOUNTER (OUTPATIENT)
Facility: HOSPITAL | Age: 57
Discharge: HOME | End: 2022-02-18
Attending: OTOLARYNGOLOGY | Admitting: OTOLARYNGOLOGY
Payer: COMMERCIAL

## 2022-02-17 DIAGNOSIS — J39.8 TRACHEAL STENOSIS: Primary | ICD-10-CM

## 2022-02-17 LAB
CTP QC/QA: YES
SARS-COV-2 AG RESP QL IA.RAPID: NEGATIVE

## 2022-02-17 PROCEDURE — 63600175 PHARM REV CODE 636 W HCPCS

## 2022-02-17 PROCEDURE — 37000009 HC ANESTHESIA EA ADD 15 MINS: Performed by: OTOLARYNGOLOGY

## 2022-02-17 PROCEDURE — 25000003 PHARM REV CODE 250: Performed by: ANESTHESIOLOGY

## 2022-02-17 PROCEDURE — 63600175 PHARM REV CODE 636 W HCPCS: Performed by: STUDENT IN AN ORGANIZED HEALTH CARE EDUCATION/TRAINING PROGRAM

## 2022-02-17 PROCEDURE — 63600175 PHARM REV CODE 636 W HCPCS: Performed by: NURSE ANESTHETIST, CERTIFIED REGISTERED

## 2022-02-17 PROCEDURE — 36000708 HC OR TIME LEV III 1ST 15 MIN: Performed by: OTOLARYNGOLOGY

## 2022-02-17 PROCEDURE — 36000709 HC OR TIME LEV III EA ADD 15 MIN: Performed by: OTOLARYNGOLOGY

## 2022-02-17 PROCEDURE — 94761 N-INVAS EAR/PLS OXIMETRY MLT: CPT

## 2022-02-17 PROCEDURE — 63600175 PHARM REV CODE 636 W HCPCS: Performed by: OTOLARYNGOLOGY

## 2022-02-17 PROCEDURE — 25000003 PHARM REV CODE 250: Performed by: NURSE ANESTHETIST, CERTIFIED REGISTERED

## 2022-02-17 PROCEDURE — 71000015 HC POSTOP RECOV 1ST HR: Performed by: OTOLARYNGOLOGY

## 2022-02-17 PROCEDURE — 31571 PR LARYNGOSCOPY,DIRECT,SCOPE,INJ CORDS: ICD-10-PCS | Mod: ,,, | Performed by: OTOLARYNGOLOGY

## 2022-02-17 PROCEDURE — D9220A PRA ANESTHESIA: Mod: CRNA,,, | Performed by: NURSE ANESTHETIST, CERTIFIED REGISTERED

## 2022-02-17 PROCEDURE — 31528 LARYNGOSCOPY AND DILATION: CPT | Mod: 51,,, | Performed by: OTOLARYNGOLOGY

## 2022-02-17 PROCEDURE — 25000003 PHARM REV CODE 250: Performed by: OTOLARYNGOLOGY

## 2022-02-17 PROCEDURE — 71000033 HC RECOVERY, INTIAL HOUR: Performed by: OTOLARYNGOLOGY

## 2022-02-17 PROCEDURE — D9220A PRA ANESTHESIA: ICD-10-PCS | Mod: CRNA,,, | Performed by: NURSE ANESTHETIST, CERTIFIED REGISTERED

## 2022-02-17 PROCEDURE — C1726 CATH, BAL DIL, NON-VASCULAR: HCPCS | Performed by: OTOLARYNGOLOGY

## 2022-02-17 PROCEDURE — 25000003 PHARM REV CODE 250: Performed by: STUDENT IN AN ORGANIZED HEALTH CARE EDUCATION/TRAINING PROGRAM

## 2022-02-17 PROCEDURE — 31571 LARYNGOSCOP W/VC INJ + SCOPE: CPT | Mod: ,,, | Performed by: OTOLARYNGOLOGY

## 2022-02-17 PROCEDURE — D9220A PRA ANESTHESIA: Mod: ANES,,, | Performed by: ANESTHESIOLOGY

## 2022-02-17 PROCEDURE — 31528 PR LARYNGOSCOPY DIRECT,W/DILAT, INITIAL: ICD-10-PCS | Mod: 51,,, | Performed by: OTOLARYNGOLOGY

## 2022-02-17 PROCEDURE — 71000016 HC POSTOP RECOV ADDL HR: Performed by: OTOLARYNGOLOGY

## 2022-02-17 PROCEDURE — 27201423 OPTIME MED/SURG SUP & DEVICES STERILE SUPPLY: Performed by: OTOLARYNGOLOGY

## 2022-02-17 PROCEDURE — D9220A PRA ANESTHESIA: ICD-10-PCS | Mod: ANES,,, | Performed by: ANESTHESIOLOGY

## 2022-02-17 PROCEDURE — 37000008 HC ANESTHESIA 1ST 15 MINUTES: Performed by: OTOLARYNGOLOGY

## 2022-02-17 RX ORDER — PHENYLEPHRINE HYDROCHLORIDE 10 MG/ML
INJECTION INTRAVENOUS
Status: DISCONTINUED | OUTPATIENT
Start: 2022-02-17 | End: 2022-02-17

## 2022-02-17 RX ORDER — SODIUM CHLORIDE 0.9 % (FLUSH) 0.9 %
10 SYRINGE (ML) INJECTION
Status: DISCONTINUED | OUTPATIENT
Start: 2022-02-17 | End: 2022-02-18 | Stop reason: HOSPADM

## 2022-02-17 RX ORDER — EPINEPHRINE 1 MG/ML
INJECTION, SOLUTION INTRACARDIAC; INTRAMUSCULAR; INTRAVENOUS; SUBCUTANEOUS
Status: DISCONTINUED | OUTPATIENT
Start: 2022-02-17 | End: 2022-02-17 | Stop reason: HOSPADM

## 2022-02-17 RX ORDER — TALC
6 POWDER (GRAM) TOPICAL NIGHTLY PRN
Status: DISCONTINUED | OUTPATIENT
Start: 2022-02-17 | End: 2022-02-18 | Stop reason: HOSPADM

## 2022-02-17 RX ORDER — OLMESARTAN MEDOXOMIL AND HYDROCHLOROTHIAZIDE 40/12.5 40; 12.5 MG/1; MG/1
1 TABLET ORAL DAILY
Status: DISCONTINUED | OUTPATIENT
Start: 2022-02-17 | End: 2022-02-17

## 2022-02-17 RX ORDER — HYDROCODONE BITARTRATE AND ACETAMINOPHEN 5; 325 MG/1; MG/1
1 TABLET ORAL EVERY 4 HOURS PRN
Status: DISCONTINUED | OUTPATIENT
Start: 2022-02-17 | End: 2022-02-18 | Stop reason: HOSPADM

## 2022-02-17 RX ORDER — ROCURONIUM BROMIDE 10 MG/ML
INJECTION, SOLUTION INTRAVENOUS
Status: DISCONTINUED | OUTPATIENT
Start: 2022-02-17 | End: 2022-02-17

## 2022-02-17 RX ORDER — MIDAZOLAM HYDROCHLORIDE 1 MG/ML
INJECTION, SOLUTION INTRAMUSCULAR; INTRAVENOUS
Status: DISCONTINUED | OUTPATIENT
Start: 2022-02-17 | End: 2022-02-17

## 2022-02-17 RX ORDER — ACETAMINOPHEN 325 MG/1
650 TABLET ORAL EVERY 4 HOURS PRN
Status: DISCONTINUED | OUTPATIENT
Start: 2022-02-17 | End: 2022-02-18 | Stop reason: HOSPADM

## 2022-02-17 RX ORDER — HYDROXYZINE HYDROCHLORIDE 25 MG/1
25 TABLET, FILM COATED ORAL 3 TIMES DAILY PRN
Status: DISCONTINUED | OUTPATIENT
Start: 2022-02-17 | End: 2022-02-18 | Stop reason: HOSPADM

## 2022-02-17 RX ORDER — HYDROCODONE BITARTRATE AND ACETAMINOPHEN 10; 325 MG/1; MG/1
1 TABLET ORAL EVERY 4 HOURS PRN
Status: DISCONTINUED | OUTPATIENT
Start: 2022-02-17 | End: 2022-02-18 | Stop reason: HOSPADM

## 2022-02-17 RX ORDER — DEXAMETHASONE SODIUM PHOSPHATE 4 MG/ML
INJECTION, SOLUTION INTRA-ARTICULAR; INTRALESIONAL; INTRAMUSCULAR; INTRAVENOUS; SOFT TISSUE
Status: DISCONTINUED | OUTPATIENT
Start: 2022-02-17 | End: 2022-02-17

## 2022-02-17 RX ORDER — ONDANSETRON 2 MG/ML
4 INJECTION INTRAMUSCULAR; INTRAVENOUS ONCE
Status: COMPLETED | OUTPATIENT
Start: 2022-02-17 | End: 2022-02-17

## 2022-02-17 RX ORDER — DEXAMETHASONE SODIUM PHOSPHATE 4 MG/ML
8 INJECTION, SOLUTION INTRA-ARTICULAR; INTRALESIONAL; INTRAMUSCULAR; INTRAVENOUS; SOFT TISSUE
Status: DISCONTINUED | OUTPATIENT
Start: 2022-02-17 | End: 2022-02-17

## 2022-02-17 RX ORDER — ACETAMINOPHEN 325 MG/1
650 TABLET ORAL EVERY 8 HOURS PRN
Status: DISCONTINUED | OUTPATIENT
Start: 2022-02-17 | End: 2022-02-18 | Stop reason: HOSPADM

## 2022-02-17 RX ORDER — PROPOFOL 10 MG/ML
VIAL (ML) INTRAVENOUS
Status: DISCONTINUED | OUTPATIENT
Start: 2022-02-17 | End: 2022-02-17

## 2022-02-17 RX ORDER — ANASTROZOLE 1 MG/1
1 TABLET ORAL DAILY
Status: DISCONTINUED | OUTPATIENT
Start: 2022-02-18 | End: 2022-02-18 | Stop reason: HOSPADM

## 2022-02-17 RX ORDER — FENTANYL CITRATE 50 UG/ML
25 INJECTION, SOLUTION INTRAMUSCULAR; INTRAVENOUS EVERY 5 MIN PRN
Status: DISCONTINUED | OUTPATIENT
Start: 2022-02-17 | End: 2022-02-17 | Stop reason: HOSPADM

## 2022-02-17 RX ORDER — SCOLOPAMINE TRANSDERMAL SYSTEM 1 MG/1
1 PATCH, EXTENDED RELEASE TRANSDERMAL ONCE
Status: DISCONTINUED | OUTPATIENT
Start: 2022-02-17 | End: 2022-02-18 | Stop reason: HOSPADM

## 2022-02-17 RX ORDER — TRIAMCINOLONE ACETONIDE 40 MG/ML
INJECTION, SUSPENSION INTRA-ARTICULAR; INTRAMUSCULAR
Status: DISCONTINUED | OUTPATIENT
Start: 2022-02-17 | End: 2022-02-17 | Stop reason: HOSPADM

## 2022-02-17 RX ORDER — ONDANSETRON 2 MG/ML
INJECTION INTRAMUSCULAR; INTRAVENOUS
Status: DISCONTINUED | OUTPATIENT
Start: 2022-02-17 | End: 2022-02-17

## 2022-02-17 RX ORDER — LIDOCAINE HYDROCHLORIDE 40 MG/ML
INJECTION, SOLUTION RETROBULBAR
Status: DISCONTINUED | OUTPATIENT
Start: 2022-02-17 | End: 2022-02-17 | Stop reason: HOSPADM

## 2022-02-17 RX ORDER — LIDOCAINE HYDROCHLORIDE 10 MG/ML
1 INJECTION, SOLUTION EPIDURAL; INFILTRATION; INTRACAUDAL; PERINEURAL ONCE
Status: DISCONTINUED | OUTPATIENT
Start: 2022-02-17 | End: 2022-02-17

## 2022-02-17 RX ORDER — LOSARTAN POTASSIUM AND HYDROCHLOROTHIAZIDE 12.5; 1 MG/1; MG/1
1 TABLET ORAL DAILY
Status: DISCONTINUED | OUTPATIENT
Start: 2022-02-17 | End: 2022-02-18 | Stop reason: HOSPADM

## 2022-02-17 RX ORDER — MORPHINE SULFATE 2 MG/ML
2 INJECTION, SOLUTION INTRAMUSCULAR; INTRAVENOUS EVERY 4 HOURS PRN
Status: DISCONTINUED | OUTPATIENT
Start: 2022-02-17 | End: 2022-02-18 | Stop reason: HOSPADM

## 2022-02-17 RX ORDER — AMLODIPINE BESYLATE 10 MG/1
10 TABLET ORAL NIGHTLY
Status: DISCONTINUED | OUTPATIENT
Start: 2022-02-17 | End: 2022-02-18 | Stop reason: HOSPADM

## 2022-02-17 RX ORDER — FENTANYL CITRATE 50 UG/ML
INJECTION, SOLUTION INTRAMUSCULAR; INTRAVENOUS
Status: DISCONTINUED | OUTPATIENT
Start: 2022-02-17 | End: 2022-02-17

## 2022-02-17 RX ORDER — ONDANSETRON 8 MG/1
8 TABLET, ORALLY DISINTEGRATING ORAL EVERY 8 HOURS PRN
Status: DISCONTINUED | OUTPATIENT
Start: 2022-02-17 | End: 2022-02-18 | Stop reason: HOSPADM

## 2022-02-17 RX ORDER — CETIRIZINE HYDROCHLORIDE 10 MG/1
10 TABLET ORAL DAILY
Status: DISCONTINUED | OUTPATIENT
Start: 2022-02-18 | End: 2022-02-18 | Stop reason: HOSPADM

## 2022-02-17 RX ORDER — HALOPERIDOL 5 MG/ML
0.5 INJECTION INTRAMUSCULAR EVERY 10 MIN PRN
Status: DISCONTINUED | OUTPATIENT
Start: 2022-02-17 | End: 2022-02-17 | Stop reason: HOSPADM

## 2022-02-17 RX ORDER — ONDANSETRON 2 MG/ML
INJECTION INTRAMUSCULAR; INTRAVENOUS
Status: COMPLETED
Start: 2022-02-17 | End: 2022-02-17

## 2022-02-17 RX ORDER — PROPOFOL 10 MG/ML
VIAL (ML) INTRAVENOUS CONTINUOUS PRN
Status: DISCONTINUED | OUTPATIENT
Start: 2022-02-17 | End: 2022-02-17

## 2022-02-17 RX ORDER — FAMOTIDINE 20 MG/1
20 TABLET, FILM COATED ORAL 2 TIMES DAILY
Status: DISCONTINUED | OUTPATIENT
Start: 2022-02-17 | End: 2022-02-18 | Stop reason: HOSPADM

## 2022-02-17 RX ORDER — TRAZODONE HYDROCHLORIDE 50 MG/1
50 TABLET ORAL NIGHTLY PRN
Status: DISCONTINUED | OUTPATIENT
Start: 2022-02-17 | End: 2022-02-18 | Stop reason: HOSPADM

## 2022-02-17 RX ORDER — HALOPERIDOL 5 MG/ML
INJECTION INTRAMUSCULAR
Status: COMPLETED
Start: 2022-02-17 | End: 2022-02-17

## 2022-02-17 RX ADMIN — ROCURONIUM BROMIDE 50 MG: 10 INJECTION, SOLUTION INTRAVENOUS at 09:02

## 2022-02-17 RX ADMIN — PROPOFOL 100 MG: 10 INJECTION, EMULSION INTRAVENOUS at 09:02

## 2022-02-17 RX ADMIN — PROPOFOL 50 MG: 10 INJECTION, EMULSION INTRAVENOUS at 09:02

## 2022-02-17 RX ADMIN — ONDANSETRON HYDROCHLORIDE 4 MG: 2 INJECTION INTRAMUSCULAR; INTRAVENOUS at 09:02

## 2022-02-17 RX ADMIN — MIDAZOLAM HYDROCHLORIDE 2 MG: 1 INJECTION, SOLUTION INTRAMUSCULAR; INTRAVENOUS at 09:02

## 2022-02-17 RX ADMIN — PHENYLEPHRINE HYDROCHLORIDE 200 MCG: 10 INJECTION INTRAVENOUS at 10:02

## 2022-02-17 RX ADMIN — SODIUM CHLORIDE: 0.9 INJECTION, SOLUTION INTRAVENOUS at 09:02

## 2022-02-17 RX ADMIN — SCOPALAMINE 1 PATCH: 1 PATCH, EXTENDED RELEASE TRANSDERMAL at 08:02

## 2022-02-17 RX ADMIN — FENTANYL CITRATE 25 MCG: 50 INJECTION, SOLUTION INTRAMUSCULAR; INTRAVENOUS at 09:02

## 2022-02-17 RX ADMIN — SUGAMMADEX 200 MG: 100 INJECTION, SOLUTION INTRAVENOUS at 10:02

## 2022-02-17 RX ADMIN — Medication 150 MCG/KG/MIN: at 09:02

## 2022-02-17 RX ADMIN — LOSARTAN POTASSIUM AND HYDROCHLOROTHIAZIDE 1 TABLET: 100; 12.5 TABLET, FILM COATED ORAL at 02:02

## 2022-02-17 RX ADMIN — REMIFENTANIL HYDROCHLORIDE 0.4 MCG/KG/MIN: 1 INJECTION, POWDER, LYOPHILIZED, FOR SOLUTION INTRAVENOUS at 09:02

## 2022-02-17 RX ADMIN — PHENYLEPHRINE HYDROCHLORIDE 100 MCG: 10 INJECTION INTRAVENOUS at 10:02

## 2022-02-17 RX ADMIN — AMLODIPINE BESYLATE 10 MG: 10 TABLET ORAL at 08:02

## 2022-02-17 RX ADMIN — DEXAMETHASONE SODIUM PHOSPHATE 8 MG: 4 INJECTION, SOLUTION INTRAMUSCULAR; INTRAVENOUS at 09:02

## 2022-02-17 RX ADMIN — ONDANSETRON 4 MG: 2 INJECTION INTRAMUSCULAR; INTRAVENOUS at 11:02

## 2022-02-17 RX ADMIN — GLYCOPYRROLATE 0.2 MG: 0.2 INJECTION, SOLUTION INTRAMUSCULAR; INTRAVITREAL at 08:02

## 2022-02-17 RX ADMIN — FAMOTIDINE 20 MG: 20 TABLET ORAL at 08:02

## 2022-02-17 RX ADMIN — HYDROCODONE BITARTRATE AND ACETAMINOPHEN 1 TABLET: 10; 325 TABLET ORAL at 08:02

## 2022-02-17 RX ADMIN — HALOPERIDOL LACTATE 0.5 MG: 5 INJECTION, SOLUTION INTRAMUSCULAR at 10:02

## 2022-02-17 NOTE — BRIEF OP NOTE
[facility] - Surgery ([facility])  Brief Operative Note    SUMMARY     Surgery Date: 2/17/2022     Surgeon(s) and Role:     * Mikey Galvez MD - Primary     * Pb Vaughan MD - Resident - Assisting        Pre-op Diagnosis:  Tracheal stenosis [J39.8]    Post-op Diagnosis:  Post-Op Diagnosis Codes:     * Tracheal stenosis [J39.8]    Procedure(s) (LRB):  Suspension microlaryngoscopy, CO2 laser excision, steroid injection, balloon dilation (N/A)    Anesthesia: General    Operative Findings: tracheal stenosis    Estimated Blood Loss: * No values recorded between 2/17/2022  9:50 AM and 2/17/2022 10:24 AM *    Estimated Blood Loss has not been documented. EBL = 5 cc.         Specimens:   Specimen (24h ago, onward)            None          [id number]

## 2022-02-17 NOTE — ANESTHESIA PREPROCEDURE EVALUATION
02/17/2022  Natasha Espinal is a 56 y.o., female.    Anesthesia Evaluation    I have reviewed the Patient Summary Reports.      I have reviewed the Medications.     Review of Systems  Anesthesia Hx:  No problems with previous Anesthesia Hx of Anesthetic complications Difficult intubation; tracheal stenosis; PONV Neg history of prior surgery. Denies Family Hx of Anesthesia complications.   Denies Personal Hx of Anesthesia complications.   Hematology/Oncology:  Hematology Normal   Oncology Normal     EENT/Dental:EENT/Dental Normal   Cardiovascular:   Exercise tolerance: good Hypertension, well controlled Denies MI.    Denies Angina.  Functional Capacity good / => 4 METS  Carotoid Artery Disease    Pulmonary:  Pulmonary Normal    Renal/:  Renal/ Normal     Hepatic/GI:   GERD, well controlled    Neurological:   Denies Headaches.  Denies Pain    Endocrine:  Endocrine Normal    Psych:  Psychiatric Normal   Phobia and Claustrophobia.         Physical Exam   Airway/Jaw/Neck:  Airway Findings: Mouth Opening: Normal Tongue: Normal  General Airway Assessment: Adult  Mallampati: III  Improves to II with phonation.  TM Distance: 4 - 6 cm  Jaw/Neck Findings:  Neck ROM: Normal ROM      Dental:  Dental Findings: In tact   Chest/Lungs:  Chest/Lungs Findings: Clear to auscultation, Normal Respiratory Rate     Heart/Vascular:  Heart Findings: Rate: Normal  Rhythm: Regular Rhythm  Sounds: Normal             Anesthesia Plan  Type of Anesthesia, risks & benefits discussed:  Anesthesia Type:  general    Patient's Preference:   Plan Factors:          Intra-op Monitoring Plan: standard ASA monitors  Intra-op Monitoring Plan Comments:   Post Op Pain Control Plan: multimodal analgesia  Post Op Pain Control Plan Comments:     Induction:   IV  Beta Blocker:  Patient is not currently on a Beta-Blocker (No further  documentation required).       Informed Consent: Patient understands risks and agrees with Anesthesia plan.  Questions answered. Anesthesia consent signed with patient.  ASA Score: 2     Day of Surgery Review of History & Physical:    H&P update referred to the surgeon.         Ready For Surgery From Anesthesia Perspective.

## 2022-02-17 NOTE — NURSING TRANSFER
Nursing Transfer Note      2/17/2022     Reason patient is being transferred: post op    Transfer To: 1045    Transfer via stretcher    Transfer with 5 humidified  to O2    Transported by pct    Medicines sent: no    Any special needs or follow-up needed: no    Chart send with patient: Yes    Notified: family

## 2022-02-17 NOTE — TRANSFER OF CARE
"Anesthesia Transfer of Care Note    Patient: Natasha Espinal    Procedure(s) Performed: Procedure(s) (LRB):  Suspension microlaryngoscopy, CO2 laser excision, steroid injection, balloon dilation (N/A)    Patient location: PACU    Anesthesia Type: general    Transport from OR: Transported from OR on 6-10 L/min O2 by face mask with adequate spontaneous ventilation    Post pain: adequate analgesia    Post assessment: no apparent anesthetic complications and tolerated procedure well    Post vital signs: stable    Level of consciousness: awake and alert    Nausea/Vomiting: no nausea/vomiting    Complications: none    Transfer of care protocol was followed      Last vitals:   Visit Vitals  /73 (BP Location: Right arm, Patient Position: Lying)   Pulse 95   Temp 36.8 °C (98.3 °F) (Oral)   Resp 16   Ht 4' 11" (1.499 m)   Wt 75.8 kg (167 lb 1.7 oz)   SpO2 98%   Breastfeeding No   BMI 33.75 kg/m²     "

## 2022-02-17 NOTE — PLAN OF CARE
Pt resting comfortably.    Call light in reach.    No questions or concerns at this time.    Sister at bedside. Kept sister belongings

## 2022-02-17 NOTE — OP NOTE
DATE OF SERVICE: 2/17/2022    PRE-OPERATIVE DIAGNOSIS: Laryngotracheal stenosis.    POST-OPERATIVE DIAGNOSIS: Laryngoracheal stenosis.    PROCEDURE(S): Suspension microlaryngoscopy with laser incision, intralesional steroid injection, balloon dilation to 16 mm    SURGEON: Mikey Galvez M.D.    ASSISTANT:   Pb Vaughan MD    ANESTHESIA: General.    BLOOD LOSS: Less than 5 mL.    SPECIMENS: None.    COMPLICATIONS: None.    FINDINGS:   1. Exposure with Ossoff-Pilling laryngoscope.    2. Circumferential, primarily right-sided and posteriorly based, contracture of the trachea beginning about 1 cm below the free edge of the true vocal folds for distance of less than 1 cm  3. Favorable response to endoscopic intervention    CONDITION: Stable.    INDICATIONS FOR PROCEDURE:   This is a lady with a history of tracheal stenosis, initially identified and treated by Dr. Ridley in 2018. She has been experiencing progressive dyspnea with mild stridor and I have noted recurrence of her problem.  I offered to take her to the operating room once again for endoscopic airway intervention. I discussed the risks, benefits and alternatives to surgery with the patient, as well as the expected postoperative course. Risks included but were not limited to pain; bleeding; infection; scarring; worsening of voice; recurrence; need for additional and/or more extensive procedures; oral/dental problems (pain, laceration, broken or missing teeth); jaw joint problems (pain, dislocation); and tongue problems (pain, laceration, numbness, weakness, taste disturbance); aerodigestive leak/fistula. Any surgery on the airway also carries with it the risks of airway obstruction necessitating tracheotomy. Also inherent in the procedure are the risks of general anesthesia, including but not limited to cardiovascular complications (heart attack, arrhythmia); pulmonary (respiratory failure); neurologic (stroke); and death. I also explained that, if the  laser is used, it presents the risks of vision loss and fire. I gave the patient the opportunity to ask questions and I answered all of them. She wishes to proceed. Informed consent was obtained.    PROCEDURE IN DETAIL:   The patient was positively identified in the preoperative holding area, then was brought to the operating room and placed in the flat supine position.     Final time-out was performed for verification purposes.  IV anesthesia induction was administered.  After confirmation of sufficient mask ventilation, IV muscle relaxation was administered. The eyes were protected with moist sponges. The teeth were  protected using a reinforced mouthguard and/or moist sponges. The   Ossoff-Pilling laryngoscope was inserted into the oral cavity and was utilized to expose the larynx.  The larynx was topically anesthetized with 3 mL of 4% lidocaine. The patient was suspended from the Hung. Magnified laryngeal endoscopy was carried out using a 0 degree Damon dominga telescope connected to a video monitor. Findings were as noted above.   Jet ventilation was initiated, with sufficient chest rise and fall noted.  All operating room personnel were equipped with laser safe eyewear. The FiO2 was maintained at less than 30%. All exposed regions of the patient's skin and face were protected with moist towels.    CO2 laser was adapted to the operating microscope with the micromanipulator.  It was set at 6 w, ultra pulse, repeat delay.  It was used to make 3 relaxing incisions through the tracheal contracture at the 2:00, 4:00, and 8:00 positions.  The patient was briefly intubated and ventilated with a 5 0 endotracheal tube.  Next, during a period of apnea, the tube was removed and the 14 mm endoscopic airway balloon catheter was positioned across the stenotic segment.  It was inflated to its maximal working pressure of 10 atmospheres for duration of 1 minute, after which it was removed.  The airway was suctioned and inspected.   Significant relief of the stenosis was noted, yet there was still a mild residual shelf.  As such, the 16 mm endoscopic airway balloon catheter was positioned across the stenotic segment and was inflated to its maximal working pressure of 10 atmospheres for duration of 1 minute, after which it was removed.  The airway was suctioned and inspected.   Further relief of the stenosis was noted, with the airway across the stenotic segment noted to be essentially flush with the native airway walls.    The stenotic segment was injected circumferentially with a solution of Kenalog 40.  Total volume administered was 0.8 mL. The patient was then easily intubated with a 6 0 endotracheal tube.  The laryngoscope was removed via reverse Seldinger technique.  The patient was turned back over to the anesthesiology team for awakening and extubation, which were uneventful. The patient was escorted to the recovery room in good condition. The patient tolerated the procedure well without complications. All needle, sponge, and instrument counts were correct at the completion of the case.    ATTESTATION:  As the attending of record, I was present and participated in all portions of this procedure.

## 2022-02-17 NOTE — ANESTHESIA POSTPROCEDURE EVALUATION
Anesthesia Post Evaluation    Patient: Natasha Espinal    Procedure(s) Performed: Procedure(s) (LRB):  Suspension microlaryngoscopy, CO2 laser excision, steroid injection, balloon dilation (N/A)    Final Anesthesia Type: general      Patient location during evaluation: PACU  Patient participation: Yes- Able to Participate  Level of consciousness: awake and alert  Post-procedure vital signs: reviewed and stable  Pain management: adequate  Airway patency: patent    PONV status at discharge: No PONV  Anesthetic complications: no      Cardiovascular status: blood pressure returned to baseline  Respiratory status: unassisted  Hydration status: euvolemic  Follow-up not needed.          Vitals Value Taken Time   /69 02/17/22 1046   Temp 36.4 °C (97.5 °F) 02/17/22 1028   Pulse 111 02/17/22 1046   Resp 16 02/17/22 1046   SpO2 96 % 02/17/22 1046   Vitals shown include unvalidated device data.      No case tracking events are documented in the log.      Pain/Ja Score: Ja Score: 8 (2/17/2022 10:33 AM)

## 2022-02-17 NOTE — ANESTHESIA PROCEDURE NOTES
Intubation    Date/Time: 2/17/2022 9:55 AM  Performed by: Dillan Garcia CRNA  Authorized by: Blayne Rosenbaum MD     Intubation:     Induction:  Intravenous    Intubated:  Postinduction    Mask Ventilation:  Easy with oral airway    Attempts:  1    Attempted By:  ENT    Method of Intubation:  Direct    Blade:  Other (see comments) (ENT DL)    Laryngeal View Grade: Grade I - full view of cords      Difficult Airway Encountered?: No      Complications:  None    Airway Device:  Oral endotracheal tube    Airway Device Size:  5.0    Style/Cuff Inflation:  Cuffed    Tube secured:  15    Secured at:  The lips    Placement Verified By:  Capnometry    Complicating Factors:  None    Findings Post-Intubation:  BS equal bilateral and atraumatic/condition of teeth unchanged

## 2022-02-18 VITALS
HEART RATE: 75 BPM | HEIGHT: 59 IN | OXYGEN SATURATION: 93 % | BODY MASS INDEX: 33.67 KG/M2 | RESPIRATION RATE: 18 BRPM | TEMPERATURE: 98 F | DIASTOLIC BLOOD PRESSURE: 59 MMHG | WEIGHT: 167 LBS | SYSTOLIC BLOOD PRESSURE: 113 MMHG

## 2022-02-18 PROCEDURE — 25000003 PHARM REV CODE 250: Performed by: STUDENT IN AN ORGANIZED HEALTH CARE EDUCATION/TRAINING PROGRAM

## 2022-02-18 PROCEDURE — 99900035 HC TECH TIME PER 15 MIN (STAT)

## 2022-02-18 PROCEDURE — 94761 N-INVAS EAR/PLS OXIMETRY MLT: CPT

## 2022-02-18 PROCEDURE — 27000221 HC OXYGEN, UP TO 24 HOURS

## 2022-02-18 RX ORDER — HYDROCODONE BITARTRATE AND ACETAMINOPHEN 5; 325 MG/1; MG/1
1 TABLET ORAL EVERY 6 HOURS PRN
Qty: 10 TABLET | Refills: 0 | Status: SHIPPED | OUTPATIENT
Start: 2022-02-18 | End: 2022-03-15

## 2022-02-18 RX ORDER — ONDANSETRON 8 MG/1
8 TABLET, ORALLY DISINTEGRATING ORAL EVERY 8 HOURS PRN
Qty: 15 TABLET | Refills: 0 | Status: SHIPPED | OUTPATIENT
Start: 2022-02-18 | End: 2022-03-15

## 2022-02-18 RX ADMIN — FAMOTIDINE 20 MG: 20 TABLET ORAL at 09:02

## 2022-02-18 RX ADMIN — CETIRIZINE HYDROCHLORIDE 10 MG: 10 TABLET, FILM COATED ORAL at 09:02

## 2022-02-18 RX ADMIN — LOSARTAN POTASSIUM AND HYDROCHLOROTHIAZIDE 1 TABLET: 100; 12.5 TABLET, FILM COATED ORAL at 09:02

## 2022-02-18 RX ADMIN — ANASTROZOLE 1 MG: 1 TABLET, COATED ORAL at 09:02

## 2022-02-18 NOTE — DISCHARGE SUMMARY
[facility]  Otorhinolaryngology-Head & Neck Surgery  Discharge Summary      Patient Name: Natasha Espinal  MRN: [id number]  Admission Date: 2/17/2022  Hospital Length of Stay: 0 days  Discharge Date and Time:  02/18/2022 6:55 AM  Attending Physician: Mikey Galvez MD   Discharging Provider: Pb Vaughan MD  Primary Care Provider: Ta Espinoza MD    HPI:   No notes on file    Procedure(s) (LRB):  Suspension microlaryngoscopy, CO2 laser excision, steroid injection, balloon dilation (N/A)      Indwelling Lines/Drains at time of discharge:   Lines/Drains/Airways     None               Hospital Course: 55 y/o F with tracheal stenosis s/p suspension microlaryngoscopy with laser incision, intralesional steroid injection, balloon dilation to 16 mm on 2/17/22. Admitted overnight for observation. Did well on the floor, no issues overnight. Breathing well this morning. Hemodynamically stable and ready for discharge.    NAD  Voice strong  Normal WOB, no stridor      Goals of Care Treatment Preferences:  Code Status: Full Code      Consults:     Significant Diagnostic Studies: none    Pending Diagnostic Studies:     None        Final Active Diagnoses:    Diagnosis Date Noted POA    PRINCIPAL PROBLEM:  Tracheal stenosis [J39.8] 02/17/2022 Yes      Problems Resolved During this Admission:      Discharged Condition: good    Disposition: Home or Self Care    Follow Up:   Follow-up Information     Mikey Galvez MD In 1 month.    Specialty: Otolaryngology  Contact information:  [facility]  [facility] [id number] [phone]                       Patient Instructions:      Diet Adult Regular     Notify your health care provider if you experience any of the following:  difficulty breathing or increased cough     Notify your health care provider if you experience any of the following:  severe uncontrolled pain     Notify your health care provider if you experience any of the following:  persistent  nausea and vomiting or diarrhea     No dressing needed     Activity as tolerated     Medications:  Reconciled Home Medications:      Medication List      START taking these medications    HYDROcodone-acetaminophen 5-325 mg per tablet  Commonly known as: NORCO  Take 1 tablet by mouth every 6 (six) hours as needed for Pain.     ondansetron 8 MG Tbdl  Commonly known as: ZOFRAN-ODT  Take 1 tablet (8 mg total) by mouth every 8 (eight) hours as needed (nausea).        CONTINUE taking these medications    albuterol 90 mcg/actuation inhaler  Commonly known as: PROVENTIL HFA  Inhale 2 puffs into the lungs every 6 (six) hours as needed for Wheezing. Rescue     amLODIPine 10 MG tablet  Commonly known as: NORVASC  TAKE 1 TABLET BY MOUTH EVERY DAY IN THE EVENING     anastrozole 1 mg Tab  Commonly known as: ARIMIDEX  Take 1 tablet (1 mg total) by mouth once daily.     ascorbate calcium (vitamin C) 500 mg Tab  Take 1 tablet by mouth.     aspirin 81 MG EC tablet  Commonly known as: ECOTRIN  Take 81 mg by mouth once daily.     azelastine 137 mcg (0.1 %) nasal spray  Commonly known as: ASTELIN  1 spray (137 mcg total) by Nasal route 2 (two) times daily.     CENTRUM WOMEN ORAL  Take by mouth.     cetirizine 10 MG tablet  Commonly known as: ZYRTEC  Take 10 mg by mouth once daily.     cholecalciferol (vitamin D3) 50 mcg (2,000 unit) Cap  Commonly known as: VITAMIN D3  Take 1 capsule by mouth once daily.     famotidine 20 MG tablet  Commonly known as: PEPCID  Take 1 tablet (20 mg total) by mouth 2 (two) times daily.     fish oil-omega-3 fatty acids 300-1,000 mg capsule  Take 1 g by mouth.     fluticasone propionate 50 mcg/actuation nasal spray  Commonly known as: FLONASE  1 spray (50 mcg total) by Each Nostril route once daily.     hydrOXYzine HCL 25 MG tablet  Commonly known as: ATARAX  Take 1 tablet (25 mg total) by mouth 3 (three) times daily as needed for Itching.     INTRAROSA 6.5 mg Inst  Generic drug: prasterone (dhea)  Place 6.5  mg vaginally every evening.     melatonin 5 mg  Commonly known as: MELATIN  Take by mouth.     multivit-iron-min-folic acid 3,500-18-0.4 unit-mg-mg Chew  Take by mouth once daily.     olmesartan-hydrochlorothiazide 40-12.5 mg Tab  Commonly known as: BENICAR HCT  Take 1 tablet by mouth once daily.     traZODone 50 MG tablet  Commonly known as: DESYREL  Take 1 tablet (50 mg total) by mouth nightly as needed for Insomnia.          Time spent on the discharge of patient: 20 minutes    Pb Vaughan MD  Otorhinolaryngology-Head & Neck Surgery  [facility]

## 2022-02-18 NOTE — HOSPITAL COURSE
55 y/o F with tracheal stenosis s/p suspension microlaryngoscopy with laser incision, intralesional steroid injection, balloon dilation to 16 mm on 2/17/22. Admitted overnight for observation. Did well on the floor, no issues overnight. Breathing well this morning. Hemodynamically stable and ready for discharge.    NAD  Voice strong  Normal WOB, no stridor

## 2022-02-18 NOTE — DISCHARGE INSTRUCTIONS
MICROLARYNGOSCOPY    Description  Laryngoscopy is a procedure in which the surgeon closely examines the larynx (voice box). The key instrument for laryngoscopy is the laryngoscope, which is a hollow metal tube inserted into the mouth. Microlaryngoscopy entails--at minimum--a magnified examination of the larynx using a microscope and/or telescopes. This is performed in the operating room. This allows the surgeon to achieve a diagnosis and also to perform precise treatment for problems on the vocal folds (vocal cords) or other parts of the larynx. Additional interventions may be performed in conjunction with microlaryngoscopy. Common interventions include but are not limited to   Biopsy   Removal of abnormal tissue (polyps, cysts, nodules, leukoplakia)   Resection of cancer   Laser treatment of abnormal tissue (papilloma, leukoplakia)   Vocal fold injection augmentation   Injection of medication (steroid, Avastin)    Instructions: before the procedure  1. NPO after midnight: This is a medical expression for nothing to eat or drink after midnight. It is important to refrain from eating or drinking anything after midnight the night before your surgery.   2. Please refrain from taking any anti-platelet medications such as aspirin; ibuprofen (Advil, Motrin); Aleve; or Plavix (clopidogrel) for 7 days prior to the procedure.  3. If you usually take Coumadin (warfarin), you may need to stop using this a few days prior to the injection.   4. If you are any type of blood thinning (anti-platelet or anti-coagulant) medication and it is not clear what you should do, please clarify this with your surgeon ahead of time. In some cases we rely on other physicians such as cardiologists or hematologists to help with these decisions.  5. Diabetes precautions: If you are usually take oral diabetes medications (such as metformin), refrain from taking your morning dose the day of the procedure and use sliding-scale  insulin for blood sugar control.  6. On the morning of your procedure, it is OK to take your other regular morning medications with a small sip of water. It is particularly important to take any medications that treat heart problems (such as blood pressure, heart rate, heart rhythm) and lung problems  (asthma, COPD). Otherwise, please remember: nothing to eat or drink after midnight.      What to expect during the procedure  Microlaryngoscopy is performed in the operating room while you are asleep under general anesthesia. In most instances, you can expect to be under general anesthesia for approximately 1 to 1 ½ hours.  Nevertheless, the duration of the procedure varies depending on the indication for surgery, intraoperative findings, and other patient-specific/anatomic issues. Our primary concerns are your safety and comfort. Our secondary goal is to provide you with the best possible surgical treatment for your problem. Your surgery will take as long as necessary to accomplish these goals.    What to expect afterwards  The laryngoscope is inserted through the mouth and presses on the tongue. The most common postoperative issues patients encounter are related to the laryngoscope being positioned in the mouth. The extent of these issues is related to patient-specific anatomic issues, the indications for surgery, and the duration of the procedure.    Pain. We will do everything we can to make sure you are as comfortable as possible. Most patients experience very little discomfort after this surgery. Nevertheless, you should expect some soreness in the mouth and throat. Because the ear and the throat share the same sensory nerve, you may also experience some discomfort in the ear. The discomfort is usually worst for the first 48-72 hours and usually resolves within a week. You will be prescribed pain medication, but most patients are sufficiently comfortable with plain Tylenol as needed.    Laceration. Some patients  may notice a small cut in the tongue or in another part of the mouth or throat. This may result in a minor about of blood-tinged saliva for the first 24 hours. This usually heals on its own over the course of a week.    Other tongue problems. As the scope presses down on the tongue, the taste buds get compressed. In addition, sometimes the nerves to the tongue also get compressed. As a result, some patients notice a disturbance in taste, numbness of the tongue, or (even more rarely) weakness of the tongue after surgery. Although sometimes it may take several weeks to months for the problem to completely go away, the tongue problems are temporary in almost every instance.    Tooth problems. Reinforced mouth guards are placed on the teeth to protect them during the surgery and we give a great deal of care and attention to minimizing any pressure on the teeth. However, a chipped or cracked tooth, loss of a tooth, and/or other tooth irregularities are rare but well-recognized complications of laryngoscopy.    Jaw problems. You may experience some pain in the jaw joints (in front of the ears). Even less frequent would be dislocation of the joint. This usually occurs in patients who already have jaw problems.    Instructions: after the procedure  1. Please take the prescribed pain medication as directed. If you are still having discomfort after the prescription runs out, or if you would rather not take a prescription narcotic pain medicine, you may instead take plain acetaminophen (Tylenol) as directed on the packaging.  2. Voice rest. In many instances, we will recommend COMPLETE VOICE REST until your follow-up visit with your surgeon. This means COMPLETE SILENCE - NO TALKING, NO COUGHING, NO WHISPERING. Please see additional information on how to manage complete voice rest. Even if voice rest is not prescribed, for the first week, you should avoid speaking over heavy background noise or in a very loud voice.   3. Please  call our office at [phone] if  - You have a temperature above 101°F  - You develop Increasing pain not relieved by medications  - You have any other questions or concerns  4. Please go immediately to the nearest emergency room if you are experiencing  - Shortness of breath  - Difficulty breathing  - Severe bleeding

## 2022-02-18 NOTE — NURSING
Pt alert and orientated x4, vital signs stable.  C/O pain x1 medications given with good effect.  Continues on oxygen via mask, O2 sats stable.  Independantly mobile in room, slept for long periods

## 2022-03-15 ENCOUNTER — OFFICE VISIT (OUTPATIENT)
Dept: OTOLARYNGOLOGY | Facility: CLINIC | Age: 57
End: 2022-03-15
Payer: COMMERCIAL

## 2022-03-15 VITALS
WEIGHT: 170.88 LBS | DIASTOLIC BLOOD PRESSURE: 84 MMHG | BODY MASS INDEX: 34.51 KG/M2 | HEART RATE: 118 BPM | SYSTOLIC BLOOD PRESSURE: 129 MMHG | TEMPERATURE: 98 F

## 2022-03-15 DIAGNOSIS — J39.8 TRACHEAL STENOSIS: Primary | ICD-10-CM

## 2022-03-15 PROCEDURE — 99213 OFFICE O/P EST LOW 20 MIN: CPT | Mod: S$GLB,,, | Performed by: NURSE PRACTITIONER

## 2022-03-15 PROCEDURE — 1159F PR MEDICATION LIST DOCUMENTED IN MEDICAL RECORD: ICD-10-PCS | Mod: CPTII,S$GLB,, | Performed by: NURSE PRACTITIONER

## 2022-03-15 PROCEDURE — 3074F SYST BP LT 130 MM HG: CPT | Mod: CPTII,S$GLB,, | Performed by: NURSE PRACTITIONER

## 2022-03-15 PROCEDURE — 3008F BODY MASS INDEX DOCD: CPT | Mod: CPTII,S$GLB,, | Performed by: NURSE PRACTITIONER

## 2022-03-15 PROCEDURE — 99213 PR OFFICE/OUTPT VISIT, EST, LEVL III, 20-29 MIN: ICD-10-PCS | Mod: S$GLB,,, | Performed by: NURSE PRACTITIONER

## 2022-03-15 PROCEDURE — 3008F PR BODY MASS INDEX (BMI) DOCUMENTED: ICD-10-PCS | Mod: CPTII,S$GLB,, | Performed by: NURSE PRACTITIONER

## 2022-03-15 PROCEDURE — 99999 PR PBB SHADOW E&M-EST. PATIENT-LVL IV: CPT | Mod: PBBFAC,,, | Performed by: NURSE PRACTITIONER

## 2022-03-15 PROCEDURE — 3079F PR MOST RECENT DIASTOLIC BLOOD PRESSURE 80-89 MM HG: ICD-10-PCS | Mod: CPTII,S$GLB,, | Performed by: NURSE PRACTITIONER

## 2022-03-15 PROCEDURE — 1159F MED LIST DOCD IN RCRD: CPT | Mod: CPTII,S$GLB,, | Performed by: NURSE PRACTITIONER

## 2022-03-15 PROCEDURE — 3074F PR MOST RECENT SYSTOLIC BLOOD PRESSURE < 130 MM HG: ICD-10-PCS | Mod: CPTII,S$GLB,, | Performed by: NURSE PRACTITIONER

## 2022-03-15 PROCEDURE — 99999 PR PBB SHADOW E&M-EST. PATIENT-LVL IV: ICD-10-PCS | Mod: PBBFAC,,, | Performed by: NURSE PRACTITIONER

## 2022-03-15 PROCEDURE — 3079F DIAST BP 80-89 MM HG: CPT | Mod: CPTII,S$GLB,, | Performed by: NURSE PRACTITIONER

## 2022-03-15 NOTE — PROGRESS NOTES
"[facility]  Department of Otorhinolaryngology and Communication Sciences    Natasha Espinal is a 56 y.o. female who returns for a post op visit. Her breathing has significantly improved since surgery. She does not have any wheezing or stridor.   She has pain to her left mandible since surgery. Her teeth hurt as well.  It is worse if she eats solid/hard foods.    She initially presented to the [facility] for consultation at the kind request of Dr. Porter Castaneda for further management of tracheal stenosis.    She complains of mild dyspnea and wheezing.  She has tracheal stenosis, ultimately identified in May 2018 when she was found to be a very difficult intubation for breast reconstruction surgery. She was connected to Dr. Ridley, who brought her for endoscopic airway intervention in Sept 2018. She had done well since then but was lost to follow up due to high out of pocket costs with her insurance plan.     Around last summer, she noticed exertional dyspnea an noisy breathing when she was trying [place] hike [place] the mountains. Since then she has been aware of mild exertional dyspnea, phonatory dyspnea, and mild inspiratory airway noise. Symptoms are stable and not deteriorating. Symptoms are nonresponsive to bronchodilator.    She had PFTs with pulm recently  FVL 11/29/21  Truncation and flattening of expiratory flow volume loop; some truncation of inspiratory loop  "No convincing evidence of reactive airway disease. No bronchodilator response. PFT not consistent with asthma."    Has a prior longstanding history of bad sinus problems, needed sinus surgery  No personal or family history of rheumatologic disorders.    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      Dyspnea Index Score 1/25/2022   Dyspnea Index Total Score  20     No flowsheet data found.    Past Medical History  She has a past medical history of Allergy, Asthma, Breast cancer, and Hypertension.    Past Surgical History  She has a past surgical " history that includes Hysterectomy (2011); Oophorectomy; deviated septum repair; Mastectomy (Bilateral, 02/2018); Breast biopsy (Left, 12/2017); Breast biopsy (Left, 1983); Breast reconstruction (Bilateral); scar tissue removal in airway  (Bilateral, 08/01/2018); Knee surgery (Left, 01/06/2020); Sinus surgery; Direct laryngobronchoscopy (N/A, 2/17/2022); Dilation of trachea (2/17/2022); and Injection of steroid (2/17/2022).    Family History  Her family history includes Arthritis in her father and mother; Asthma in her brother and mother; Breast cancer (age of onset: 43) in her paternal aunt; Breast cancer (age of onset: 55) in her paternal aunt; Breast cancer (age of onset: 62) in her paternal aunt; COPD in her father; Colon cancer in her paternal uncle; Hearing loss in her father; Heart disease in her father.    Social History  She reports that she has never smoked. She has never used smokeless tobacco. She reports that she does not drink alcohol and does not use drugs.    Allergies  She is allergic to naproxen and sulfa (sulfonamide antibiotics).    Medications  She has a current medication list which includes the following prescription(s): amlodipine, anastrozole, ascorbate calcium (vitamin c), aspirin, cetirizine, cholecalciferol (vitamin d3), famotidine, fish oil-omega-3 fatty acids, fluticasone propionate, hydroxyzine hcl, melatonin, multivit-iron-min-folic acid, multivitamin/iron/folic acid, olmesartan-hydrochlorothiazide, intrarosa, trazodone, albuterol, azelastine, hydrocodone-acetaminophen, and ondansetron.    Review of Systems   Constitutional: Negative.  Negative for fever.   HENT: Positive for mouth sores, postnasal drip, sinus pressure, trouble swallowing and voice change. Negative for sore throat.    Eyes: Negative.  Negative for visual disturbance.   Respiratory: Positive for cough, shortness of breath and wheezing.    Cardiovascular: Negative.  Negative for chest pain.   Gastrointestinal: Negative.   Negative for nausea.   Endocrine: Negative.    Genitourinary: Negative.    Musculoskeletal: Negative.  Negative for arthralgias.   Skin: Negative.  Negative for rash.   Allergic/Immunologic: Negative.    Neurological: Negative.  Negative for tremors.   Hematological: Negative.  Does not bruise/bleed easily.   Psychiatric/Behavioral: Negative.  The patient is not nervous/anxious.           Objective:     /84   Pulse (!) 118   Temp 98.2 °F (36.8 °C)   Wt 77.5 kg (170 lb 13.7 oz)   BMI 34.51 kg/m²      Physical Exam  Constitutional: comfortable, well dressed  Psychiatric: appropriate affect  Respiratory: comfortably breathing, symmetric chest rise, no stridor  Voice: faint inconsistent inspiratory stridor  Cardiovascular: upper extremities non-edematous  Lymphatic: no cervical lymphadenopathy  Neurologic: alert and oriented to time, place, person, and situation; cranial nerves 3-12 grossly intact  Head: normocephalic  Eyes: conjunctivae and sclerae clear  Ears: normal pinnae, normal external auditory canals, tympanic membranes intact  Nose: mucosa pink and noncongested, no masses, no mucopurulence, no polyps  Oral cavity / oropharynx: no mucosal lesions  Neck: soft, full range of motion, laryngotracheal complex palpable with appropriate landmarks, larynx elevates on swallowing  Indirect laryngoscopy: limited due to gag    Procedure  Flexible Laryngoscopy (30507): Laryngoscopy is indicated for assessment of upper aerodigestive structure and function. This was carried out transnasally with a [employer]opia chip videoendoscope. After verbal consent was obtained, the patient was positioned and the nose was topically decongested with 1% phenylephrine and topically anesthetized with 4% lidocaine. The endoscope was passed through the most patent nasal cavity and positioned to image the nasopharynx, larynx, and hypopharynx in detail. The following features were examined: nasopharyngeal, laryngeal, hypopharyngeal masses;  velopharyngeal strength, closure, and symmetry of motion; vocal fold range and symmetry of motion; laryngeal mucosal edema, erythema, inflammation, and hydration; salivary pooling; and gross laryngeal sensation. The equipment was removed. The patient tolerated the procedure well without complication. All findings were normal except:  - significant improvement in airway. See image below.            Assessment:     Natasha Espinal is a 56 y.o. female with chronic tracheal stenosis, suspected to be idiopathic. Now 1 month s/p suspension microlaryngoscopy, CO2 laser excision, steroid injection, balloon dilation.       Plan:        Doing well. Discussed that oral pain may be related to mouth guard used in surgery. Recommended eating a soft diet and using magic mouthwash. Questions answered. RTC in 1 month, sooner if needed.

## 2022-03-17 DIAGNOSIS — J31.0 CHRONIC RHINITIS: ICD-10-CM

## 2022-03-29 RX ORDER — AZELASTINE 1 MG/ML
SPRAY, METERED NASAL
Qty: 30 ML | Refills: 3 | Status: SHIPPED | OUTPATIENT
Start: 2022-03-29 | End: 2022-04-21

## 2022-04-06 ENCOUNTER — OFFICE VISIT (OUTPATIENT)
Dept: HEMATOLOGY/ONCOLOGY | Facility: CLINIC | Age: 57
End: 2022-04-06
Payer: COMMERCIAL

## 2022-04-06 VITALS
TEMPERATURE: 98 F | HEIGHT: 59 IN | SYSTOLIC BLOOD PRESSURE: 158 MMHG | DIASTOLIC BLOOD PRESSURE: 80 MMHG | BODY MASS INDEX: 34.4 KG/M2 | WEIGHT: 170.63 LBS | OXYGEN SATURATION: 95 % | RESPIRATION RATE: 18 BRPM | HEART RATE: 78 BPM

## 2022-04-06 DIAGNOSIS — Z79.811 USE OF AROMATASE INHIBITORS: ICD-10-CM

## 2022-04-06 DIAGNOSIS — C50.612 CARCINOMA OF AXILLARY TAIL OF LEFT BREAST IN FEMALE, ESTROGEN RECEPTOR POSITIVE: Primary | ICD-10-CM

## 2022-04-06 DIAGNOSIS — Z17.0 CARCINOMA OF AXILLARY TAIL OF LEFT BREAST IN FEMALE, ESTROGEN RECEPTOR POSITIVE: Primary | ICD-10-CM

## 2022-04-06 PROCEDURE — 3077F PR MOST RECENT SYSTOLIC BLOOD PRESSURE >= 140 MM HG: ICD-10-PCS | Mod: CPTII,S$GLB,, | Performed by: INTERNAL MEDICINE

## 2022-04-06 PROCEDURE — 99999 PR PBB SHADOW E&M-EST. PATIENT-LVL V: CPT | Mod: PBBFAC,,, | Performed by: INTERNAL MEDICINE

## 2022-04-06 PROCEDURE — 1159F MED LIST DOCD IN RCRD: CPT | Mod: CPTII,S$GLB,, | Performed by: INTERNAL MEDICINE

## 2022-04-06 PROCEDURE — 99214 PR OFFICE/OUTPT VISIT, EST, LEVL IV, 30-39 MIN: ICD-10-PCS | Mod: S$GLB,,, | Performed by: INTERNAL MEDICINE

## 2022-04-06 PROCEDURE — 1159F PR MEDICATION LIST DOCUMENTED IN MEDICAL RECORD: ICD-10-PCS | Mod: CPTII,S$GLB,, | Performed by: INTERNAL MEDICINE

## 2022-04-06 PROCEDURE — 3077F SYST BP >= 140 MM HG: CPT | Mod: CPTII,S$GLB,, | Performed by: INTERNAL MEDICINE

## 2022-04-06 PROCEDURE — 3008F PR BODY MASS INDEX (BMI) DOCUMENTED: ICD-10-PCS | Mod: CPTII,S$GLB,, | Performed by: INTERNAL MEDICINE

## 2022-04-06 PROCEDURE — 99214 OFFICE O/P EST MOD 30 MIN: CPT | Mod: S$GLB,,, | Performed by: INTERNAL MEDICINE

## 2022-04-06 PROCEDURE — 3079F DIAST BP 80-89 MM HG: CPT | Mod: CPTII,S$GLB,, | Performed by: INTERNAL MEDICINE

## 2022-04-06 PROCEDURE — 99999 PR PBB SHADOW E&M-EST. PATIENT-LVL V: ICD-10-PCS | Mod: PBBFAC,,, | Performed by: INTERNAL MEDICINE

## 2022-04-06 PROCEDURE — 3079F PR MOST RECENT DIASTOLIC BLOOD PRESSURE 80-89 MM HG: ICD-10-PCS | Mod: CPTII,S$GLB,, | Performed by: INTERNAL MEDICINE

## 2022-04-06 PROCEDURE — 3008F BODY MASS INDEX DOCD: CPT | Mod: CPTII,S$GLB,, | Performed by: INTERNAL MEDICINE

## 2022-04-06 NOTE — PROGRESS NOTES
Subjective:       Patient ID: Natasha Espinal is a 56 y.o. female.    Chief Complaint: Carcinoma of axillary tail of left breast in female, estrog    HPI     Mrs. Espinal returns today for follow-up.  Of note, she has been on anastrazole since March 2018, and so far has tolerated it OK.      Briefly, she is a 56-year-old  female who was referred for evaluation after undergoing bilateral nipple-sparing mastectomies.   On the left nipple-sparing mastectomy specimen, there was a 3.4 cm tumor.  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  There was no evidence of DCIS or malignancy on the right mastectomy specimen.  A MammaPrint test was sent and suggested that she had a 97.8% chance of disease free survival at five years with hormonal therapy.    Her DXA scan on on November 5, 2021 had shown mild osteopenia of the spine and low normal density of the hip.   She was recently evaluated by the Pulmonary service for asthma, however, her PFTs were suggestive of extra her thoracic tracheal stenosis.  She recently underwent a tracheal dilatation with immediate improvement of her symptoms.    Review of Systems      Overall she feels OK.  As mentioned above, her shortness of breath improved after the tracheal dilatation.  She also recently underwent carpal tunnel surgery on the left side.  She tolerated the procedure well.  ECOG PS is 1.  She denies any depression, easy bruising, fevers, chills, night  sweats, weight loss, nausea, vomiting, diarrhea, constipation, diplopia, blurred vision, headache, chest pain, palpitations, shortness of breath, breast pain, abdominal pain, extremity pain, or difficulty ambulating.  The remainder of the ten-point ROS, including general, skin, lymph, H/N, cardiorespiratory, GI, , Neuro, Endocrine, and psychiatric is negative.       Objective:      Physical Exam        She is alert, oriented to time, place, person, pleasant, well nourished, in no acute  physical distress.                                VITAL SIGNS:  Reviewed.                                   HEENT:  Normal.  There are no nasal, oral, lip, gingival, auricular, lid,    or conjunctival lesions.  Mucosae are moist and pink, and there is no        thrush.  Pupils are equal, reactive to light and accommodation.              Extraocular muscle movements are intact.  Dentition is good.  There is no frontal or maxillary tenderness.                                     NECK:  Supple without JVD, adenopathy, or thyromegaly.                       LUNGS:  Clear to auscultation without wheezing, rales, or rhonchi.           CARDIOVASCULAR:  Reveals an S1, S2, no murmurs, no rubs, no gallops.         ABDOMEN:  Soft, nontender, without organomegaly.  Bowel sounds are    present.   Her SHRUTHI flap incision has healed nicely.                                                                 EXTREMITIES:  No cyanosis, clubbing, or edema.                             BREASTS:  She is status post bilateral nipples paring mastectomies.     Her incisions are well healed and no masses are palpated.                               LYMPHATIC:  There is no cervical, axillary, or supraclavicular adenopathy.   SKIN:  Warm and moist, without petechiae, rashes, induration, or ecchymoses.           NEUROLOGIC:  DTRs are 0-1+ bilaterally, symmetrical, motor function is 5/5,  and cranial nerves are  within normal limits.    Assessment:       1. Carcinoma of axillary tail of left breast in female, estrogen receptor positive    2. Use of aromatase inhibitors         3.  Extrathoracic Tracheal stenosis   Plan:        I had a long discussion with her.  She will remain on anastrazole through the end of March 2023 and see me again in 6 months.  In March 2023 we will discuss the option of extending her adjuvant hormonal therapy to 7 years.  In regards to her tracheal stenosis, she will follow-up with ENT and Pulmonary.  She understands that she  may need to have a repeat dilatation in the future.  Her DEXA scan will be repeated in November 2023.    Her multiple questions were answered to her satisfaction.

## 2022-04-21 ENCOUNTER — OFFICE VISIT (OUTPATIENT)
Dept: OTOLARYNGOLOGY | Facility: CLINIC | Age: 57
End: 2022-04-21
Payer: COMMERCIAL

## 2022-04-21 VITALS
WEIGHT: 171.31 LBS | HEART RATE: 72 BPM | TEMPERATURE: 98 F | DIASTOLIC BLOOD PRESSURE: 88 MMHG | SYSTOLIC BLOOD PRESSURE: 130 MMHG | BODY MASS INDEX: 34.6 KG/M2

## 2022-04-21 DIAGNOSIS — J39.8 TRACHEAL STENOSIS: Primary | ICD-10-CM

## 2022-04-21 PROCEDURE — 3075F PR MOST RECENT SYSTOLIC BLOOD PRESS GE 130-139MM HG: ICD-10-PCS | Mod: CPTII,S$GLB,, | Performed by: NURSE PRACTITIONER

## 2022-04-21 PROCEDURE — 3079F PR MOST RECENT DIASTOLIC BLOOD PRESSURE 80-89 MM HG: ICD-10-PCS | Mod: CPTII,S$GLB,, | Performed by: NURSE PRACTITIONER

## 2022-04-21 PROCEDURE — 3008F PR BODY MASS INDEX (BMI) DOCUMENTED: ICD-10-PCS | Mod: CPTII,S$GLB,, | Performed by: NURSE PRACTITIONER

## 2022-04-21 PROCEDURE — 99999 PR PBB SHADOW E&M-EST. PATIENT-LVL III: ICD-10-PCS | Mod: PBBFAC,,, | Performed by: NURSE PRACTITIONER

## 2022-04-21 PROCEDURE — 99212 PR OFFICE/OUTPT VISIT, EST, LEVL II, 10-19 MIN: ICD-10-PCS | Mod: S$GLB,,, | Performed by: NURSE PRACTITIONER

## 2022-04-21 PROCEDURE — 3079F DIAST BP 80-89 MM HG: CPT | Mod: CPTII,S$GLB,, | Performed by: NURSE PRACTITIONER

## 2022-04-21 PROCEDURE — 3075F SYST BP GE 130 - 139MM HG: CPT | Mod: CPTII,S$GLB,, | Performed by: NURSE PRACTITIONER

## 2022-04-21 PROCEDURE — 1159F MED LIST DOCD IN RCRD: CPT | Mod: CPTII,S$GLB,, | Performed by: NURSE PRACTITIONER

## 2022-04-21 PROCEDURE — 99999 PR PBB SHADOW E&M-EST. PATIENT-LVL III: CPT | Mod: PBBFAC,,, | Performed by: NURSE PRACTITIONER

## 2022-04-21 PROCEDURE — 1159F PR MEDICATION LIST DOCUMENTED IN MEDICAL RECORD: ICD-10-PCS | Mod: CPTII,S$GLB,, | Performed by: NURSE PRACTITIONER

## 2022-04-21 PROCEDURE — 3008F BODY MASS INDEX DOCD: CPT | Mod: CPTII,S$GLB,, | Performed by: NURSE PRACTITIONER

## 2022-04-21 PROCEDURE — 99212 OFFICE O/P EST SF 10 MIN: CPT | Mod: S$GLB,,, | Performed by: NURSE PRACTITIONER

## 2022-04-21 NOTE — PROGRESS NOTES
"[facility]  Department of Otorhinolaryngology and Communication Sciences    Natasha Espinal is a 56 y.o. female who returns for a post op visit. Her breathing has significantly improved since surgery. She does not have any wheezing or stridor.   Her mandible pain has resolved. It does feel "rough," but there are no ulcers or bleeding. No complaints.    She initially presented to the [facility] for consultation at the kind request of Dr. Porter Castaneda for further management of tracheal stenosis.    She complains of mild dyspnea and wheezing.  She has tracheal stenosis, ultimately identified in May 2018 when she was found to be a very difficult intubation for breast reconstruction surgery. She was connected to Dr. Ridley, who brought her for endoscopic airway intervention in Sept 2018. She had done well since then but was lost to follow up due to high out of pocket costs with her insurance plan.     Around last summer, she noticed exertional dyspnea an noisy breathing when she was trying to hike in the mountains. Since then she has been aware of mild exertional dyspnea, phonatory dyspnea, and mild inspiratory airway noise. Symptoms are stable and not deteriorating. Symptoms are nonresponsive to bronchodilator.    She had PFTs with pulm recently  FVL 11/29/21  Truncation and flattening of expiratory flow volume loop; some truncation of inspiratory loop  "No convincing evidence of reactive airway disease. No bronchodilator response. PFT not consistent with asthma."    Has a prior longstanding history of bad sinus problems, needed sinus surgery  No personal or family history of rheumatologic disorders.    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      Dyspnea Index Score 1/25/2022   Dyspnea Index Total Score  20     No flowsheet data found.    Past Medical History  She has a past medical history of Allergy, Asthma, Breast cancer, and Hypertension.    Past Surgical History  She has a past surgical history that " includes Hysterectomy (2011); Oophorectomy; deviated septum repair; Mastectomy (Bilateral, 02/2018); Breast biopsy (Left, 12/2017); Breast biopsy (Left, 1983); Breast reconstruction (Bilateral); scar tissue removal in airway  (Bilateral, 08/01/2018); Knee surgery (Left, 01/06/2020); Sinus surgery; Direct laryngobronchoscopy (N/A, 2/17/2022); Dilation of trachea (2/17/2022); and Injection of steroid (2/17/2022).    Family History  Her family history includes Arthritis in her father and mother; Asthma in her brother and mother; Breast cancer (age of onset: 43) in her paternal aunt; Breast cancer (age of onset: 55) in her paternal aunt; Breast cancer (age of onset: 62) in her paternal aunt; COPD in her father; Colon cancer in her paternal uncle; Hearing loss in her father; Heart disease in her father.    Social History  She reports that she has never smoked. She has never used smokeless tobacco. She reports that she does not drink alcohol and does not use drugs.    Allergies  She is allergic to naproxen and sulfa (sulfonamide antibiotics).    Medications  She has a current medication list which includes the following prescription(s): diphenhydramine hcl, amlodipine, anastrozole, ascorbate calcium (vitamin c), aspirin, cetirizine, cholecalciferol (vitamin d3), famotidine, fish oil-omega-3 fatty acids, fluticasone propionate, hydroxyzine hcl, melatonin, multivit-iron-min-folic acid, multivitamin/iron/folic acid, olmesartan-hydrochlorothiazide, intrarosa, trazodone, albuterol, and azelastine.    Review of Systems   Constitutional: Negative.  Negative for fever.   HENT: Positive for postnasal drip and sinus pressure. Negative for mouth sores, sore throat, trouble swallowing and voice change.    Eyes: Negative.  Negative for visual disturbance.   Respiratory: Negative for cough, shortness of breath and wheezing.    Cardiovascular: Negative.  Negative for chest pain.   Gastrointestinal: Negative.  Negative for nausea.    Endocrine: Negative.    Genitourinary: Negative.    Musculoskeletal: Negative.  Negative for arthralgias.   Skin: Negative.  Negative for rash.   Allergic/Immunologic: Negative.    Neurological: Negative.  Negative for tremors.   Hematological: Negative.  Does not bruise/bleed easily.   Psychiatric/Behavioral: Negative.  The patient is not nervous/anxious.           Objective:     /88   Pulse 72   Temp 98.2 °F (36.8 °C)   Wt 77.7 kg (171 lb 4.8 oz)   BMI 34.60 kg/m²      Physical Exam  Constitutional: comfortable, well dressed  Psychiatric: appropriate affect  Respiratory: comfortably breathing, symmetric chest rise, no stridor  Voice: faint inconsistent inspiratory stridor  Cardiovascular: upper extremities non-edematous  Lymphatic: no cervical lymphadenopathy  Neurologic: alert and oriented to time, place, person, and situation; cranial nerves 3-12 grossly intact  Head: normocephalic  Eyes: conjunctivae and sclerae clear  Oral cavity / oropharynx: no mucosal lesions      Assessment:     Natasha Espinal is a 56 y.o. female with chronic tracheal stenosis, suspected to be idiopathic. Now 2 months s/p suspension microlaryngoscopy, CO2 laser excision, steroid injection, balloon dilation.       Plan:        Doing well. Questions answered. RTC in 2 months for laryngoscopy, sooner with any changes.

## 2022-05-02 ENCOUNTER — OFFICE VISIT (OUTPATIENT)
Dept: INTERNAL MEDICINE | Facility: CLINIC | Age: 57
End: 2022-05-02
Payer: COMMERCIAL

## 2022-05-02 VITALS
HEART RATE: 93 BPM | DIASTOLIC BLOOD PRESSURE: 72 MMHG | BODY MASS INDEX: 35 KG/M2 | HEIGHT: 59 IN | WEIGHT: 173.63 LBS | OXYGEN SATURATION: 95 % | SYSTOLIC BLOOD PRESSURE: 120 MMHG

## 2022-05-02 DIAGNOSIS — Z00.00 ANNUAL PHYSICAL EXAM: Primary | ICD-10-CM

## 2022-05-02 DIAGNOSIS — I10 ESSENTIAL HYPERTENSION: ICD-10-CM

## 2022-05-02 DIAGNOSIS — Z85.3 HISTORY OF BREAST CANCER: ICD-10-CM

## 2022-05-02 DIAGNOSIS — E78.2 MIXED HYPERLIPIDEMIA: ICD-10-CM

## 2022-05-02 DIAGNOSIS — J39.8 TRACHEAL STENOSIS: ICD-10-CM

## 2022-05-02 DIAGNOSIS — F41.9 ANXIETY: ICD-10-CM

## 2022-05-02 PROCEDURE — 99999 PR PBB SHADOW E&M-EST. PATIENT-LVL IV: ICD-10-PCS | Mod: PBBFAC,,, | Performed by: INTERNAL MEDICINE

## 2022-05-02 PROCEDURE — 99214 PR OFFICE/OUTPT VISIT, EST, LEVL IV, 30-39 MIN: ICD-10-PCS | Mod: S$GLB,,, | Performed by: INTERNAL MEDICINE

## 2022-05-02 PROCEDURE — 3008F BODY MASS INDEX DOCD: CPT | Mod: CPTII,S$GLB,, | Performed by: INTERNAL MEDICINE

## 2022-05-02 PROCEDURE — 1159F MED LIST DOCD IN RCRD: CPT | Mod: CPTII,S$GLB,, | Performed by: INTERNAL MEDICINE

## 2022-05-02 PROCEDURE — 3074F PR MOST RECENT SYSTOLIC BLOOD PRESSURE < 130 MM HG: ICD-10-PCS | Mod: CPTII,S$GLB,, | Performed by: INTERNAL MEDICINE

## 2022-05-02 PROCEDURE — 1160F PR REVIEW ALL MEDS BY PRESCRIBER/CLIN PHARMACIST DOCUMENTED: ICD-10-PCS | Mod: CPTII,S$GLB,, | Performed by: INTERNAL MEDICINE

## 2022-05-02 PROCEDURE — 3074F SYST BP LT 130 MM HG: CPT | Mod: CPTII,S$GLB,, | Performed by: INTERNAL MEDICINE

## 2022-05-02 PROCEDURE — 3078F PR MOST RECENT DIASTOLIC BLOOD PRESSURE < 80 MM HG: ICD-10-PCS | Mod: CPTII,S$GLB,, | Performed by: INTERNAL MEDICINE

## 2022-05-02 PROCEDURE — 1160F RVW MEDS BY RX/DR IN RCRD: CPT | Mod: CPTII,S$GLB,, | Performed by: INTERNAL MEDICINE

## 2022-05-02 PROCEDURE — 3078F DIAST BP <80 MM HG: CPT | Mod: CPTII,S$GLB,, | Performed by: INTERNAL MEDICINE

## 2022-05-02 PROCEDURE — 99999 PR PBB SHADOW E&M-EST. PATIENT-LVL IV: CPT | Mod: PBBFAC,,, | Performed by: INTERNAL MEDICINE

## 2022-05-02 PROCEDURE — 1159F PR MEDICATION LIST DOCUMENTED IN MEDICAL RECORD: ICD-10-PCS | Mod: CPTII,S$GLB,, | Performed by: INTERNAL MEDICINE

## 2022-05-02 PROCEDURE — 99214 OFFICE O/P EST MOD 30 MIN: CPT | Mod: S$GLB,,, | Performed by: INTERNAL MEDICINE

## 2022-05-02 PROCEDURE — 3008F PR BODY MASS INDEX (BMI) DOCUMENTED: ICD-10-PCS | Mod: CPTII,S$GLB,, | Performed by: INTERNAL MEDICINE

## 2022-05-02 NOTE — PROGRESS NOTES
[facility] Note    Chief Complaint      Chief Complaint   Patient presents with    Hypertension     6 month f/u       History of Present Illness      Natasha Espinal is a 56 y.o. female with chronic conditions of HTN, HLD, hx of breast cancer, asthma, anxiety, insomnia who presents today for: follow up chronic conditions.  Since last visit, had evaluation with pulmonology, diagnosed with tracheal stenosis.  ENT, Dr. Galvez, performed laser resection.  Also had left carpal tunnel repair with Dr. Gardiner.  Will be having right side done soon.  Undergoing OT.    HTN: BP at goal on amlodipine, losartan-hydrochlorothiazide.    HLD: Controlled off atorvastatin.  LDL 112 last check.  Repeat in 6 months.  Hx of breast cancer: Sees oncology, Dr. Olivier.  On anastrazole, on 4/7 years.  Still with myalgias with anastrozole.  No new changes.  Asthma: Has albuterol as needed.  Has not needed rescue since last visit.  Anxiety: Controlled off effexor.  No new or worsening symptoms.    Insomnia: Partially controlled on trazodone.  Doing well on this regimen.     Flu shot UTD.  TdAP 2017.  Shingles vaccine discussed.  Pneumonia vaccine due age 64.  COVID vaccine UTD.  Mammogram N/A bilateral mastectomy.  DEXA 2019, Dr. Boswell.  FIT 11/2021.    Past Medical History:  Past Medical History:   Diagnosis Date    Allergy     Asthma     Breast cancer 12/2017    left     Hypertension        Past Surgical History:   has a past surgical history that includes Hysterectomy (2011); Oophorectomy; deviated septum repair; Mastectomy (Bilateral, 02/2018); Breast biopsy (Left, 12/2017); Breast biopsy (Left, 1983); Breast reconstruction (Bilateral); scar tissue removal in airway  (Bilateral, 08/01/2018); Knee surgery (Left, 01/06/2020); Sinus surgery; Direct laryngobronchoscopy (N/A, 2/17/2022); Dilation of trachea (2/17/2022); and Injection of steroid (2/17/2022).    Family History:  family history includes Arthritis  in her father and mother; Asthma in her brother and mother; Breast cancer (age of onset: 43) in her paternal aunt; Breast cancer (age of onset: 55) in her paternal aunt; Breast cancer (age of onset: 62) in her paternal aunt; COPD in her father; Colon cancer in her paternal uncle; Hearing loss in her father; Heart disease in her father.     Social History:  Social History     Tobacco Use    Smoking status: Never Smoker    Smokeless tobacco: Never Used   Substance Use Topics    Alcohol use: No    Drug use: No       I personally reviewed all past medical, surgical, social and family history.    Review of Systems   Constitutional: Negative for chills, fever and malaise/fatigue.   Respiratory: Negative for shortness of breath.    Cardiovascular: Negative for chest pain.   Gastrointestinal: Negative for constipation, diarrhea, nausea and vomiting.   Skin: Negative for rash.   Neurological: Negative for weakness.   All other systems reviewed and are negative.       Medications:  Outpatient Encounter Medications as of 5/2/2022   Medication Sig Note Dispense Refill    (Magic mouthwash) 1:1:1 diphenhydramine(Benadryl) 12.5mg/5ml liq, aluminum & magnesium hydroxide-simethicone (Maalox), LIDOcaine viscous 2% Swish and spit 5 mLs every 4 (four) hours as needed (mouth sores). for mouth sores  360 mL 2    albuterol (PROVENTIL HFA) 90 mcg/actuation inhaler Inhale 2 puffs into the lungs every 6 (six) hours as needed for Wheezing. Rescue  16 g 3    amLODIPine (NORVASC) 10 MG tablet TAKE 1 TABLET BY MOUTH EVERY DAY IN THE EVENING  90 tablet 3    anastrozole (ARIMIDEX) 1 mg Tab TAKE 1 TABLET BY MOUTH EVERY DAY  90 tablet 2    ascorbate calcium 500 mg Tab Take 1 tablet by mouth. 2/11/2022: Hold 1 week prior to surgery          aspirin (ECOTRIN) 81 MG EC tablet Take 81 mg by mouth once daily. 2/11/2022: HOLD UNTIL AFTER SURGERY      cetirizine (ZYRTEC) 10 MG tablet Take 10 mg by mouth once daily.       cholecalciferol,  vitamin D3, (VITAMIN D3) 50 mcg (2,000 unit) Cap Take 1 capsule by mouth once daily. 2/11/2022: HOLD UNTIL AFTER SURGERY        famotidine (PEPCID) 20 MG tablet Take 1 tablet (20 mg total) by mouth 2 (two) times daily.  60 tablet 11    fish oil-omega-3 fatty acids 300-1,000 mg capsule Take 1 g by mouth. 2/11/2022: HOLD UNTIL AFTER SURGERY      fluticasone propionate (FLONASE) 50 mcg/actuation nasal spray 1 spray (50 mcg total) by Each Nostril route once daily.  11.1 mL 2    hydrOXYzine HCL (ATARAX) 25 MG tablet Take 1 tablet (25 mg total) by mouth 3 (three) times daily as needed for Itching.  60 tablet 1    melatonin 5 mg Tab Take by mouth.       MULTIVIT-IRON-MIN-FOLIC ACID 3,500-18-0.4 UNIT-MG-MG ORAL CHEW Take by mouth once daily. 2/11/2022: HOLD UNTIL AFTER SURGERY      multivitamin/iron/folic acid (CENTRUM WOMEN ORAL) Take by mouth. 2/11/2022: HOLD UNTIL AFTER SURGERY      olmesartan-hydrochlorothiazide (BENICAR HCT) 40-12.5 mg Tab Take 1 tablet by mouth once daily.  90 tablet 3    prasterone, dhea, (INTRAROSA) 6.5 mg Inst Place 6.5 mg vaginally every evening.  30 each 3    traZODone (DESYREL) 50 MG tablet Take 1 tablet (50 mg total) by mouth nightly as needed for Insomnia.  90 tablet 3     No facility-administered encounter medications on file as of 5/2/2022.       Allergies:  Review of patient's allergies indicates:   Allergen Reactions    Naproxen Itching    Sulfa (sulfonamide antibiotics) Nausea Only       Health Maintenance:  Immunization History   Administered Date(s) Administered    COVID-19, MRNA, LN-S, PF (Pfizer) (Purple Cap) 02/25/2021, 03/18/2021, 11/09/2021    Influenza (FLUBLOK) - Quadrivalent - Recombinant - PF *Preferred* (egg allergy) 10/05/2020    Influenza - Quadrivalent 11/24/2014    Influenza - Quadrivalent - PF *Preferred* (6 months and older) 08/28/2019, 01/22/2020, 10/22/2021    Tdap 07/31/2017      Health Maintenance   Topic Date Due    Lipid Panel  11/09/2026     "TETANUS VACCINE  07/31/2027    Hepatitis C Screening  Completed        Physical Exam      Vital Signs  Pulse: 93  SpO2: 95 %  BP: 120/72  BP Location: Right arm  Patient Position: Sitting  Pain Score: 0-No pain  Height and Weight  Height: 4' 11" (149.9 cm)  Weight: 78.8 kg (173 lb 9.8 oz)  BSA (Calculated - sq m): 1.81 sq meters  BMI (Calculated): 35  Weight in (lb) to have BMI = 25: 123.5]    Physical Exam  Vitals reviewed.   Constitutional:       Appearance: She is well-developed.   HENT:      Head: Normocephalic and atraumatic.      Right Ear: External ear normal.      Left Ear: External ear normal.   Cardiovascular:      Rate and Rhythm: Normal rate and regular rhythm.      Heart sounds: Normal heart sounds. No murmur heard.  Pulmonary:      Effort: Pulmonary effort is normal.      Breath sounds: Normal breath sounds. No wheezing or rales.   Abdominal:      General: Bowel sounds are normal. There is no distension.      Palpations: Abdomen is soft.      Tenderness: There is no abdominal tenderness.          Laboratory:  CBC:  Recent Labs   Lab 10/26/20  0909 11/09/21  0843 12/21/21  1006   WBC 7.30 6.75 7.01   RBC 4.53 4.60 4.90   Hemoglobin 13.0 13.1 14.0   Hematocrit 40.1 40.2 44.8   Platelets 284 292 305   MCV 89 87 91   MCH 28.7 28.5 28.6   MCHC 32.4 32.6 31.3 L     CMP:  Recent Labs   Lab 12/02/19  0932 10/26/20  0909 11/09/21  0843 02/16/22  1353   Glucose 99 98 99 113 H   Calcium 10.2 9.7 9.8 10.0   Albumin 4.6 4.5 4.8  --    Total Protein 7.7 7.4 7.7  --    Sodium 146 H 144 143 141   Potassium 4.3 4.0 3.9 3.7   CO2 34 H 30 H 30 H 29   Chloride 103 106 104 101   BUN 17 16 21 H 16   Alkaline Phosphatase 85 74 72  --    ALT 35 40 42  --    AST 26 26 26  --    Total Bilirubin 0.3 0.5 0.4  --      URINALYSIS:  Recent Labs   Lab 09/20/19  0927 10/15/19  0958   Color, UA Yellow Yellow   Specific Gravity, UA 1.020 1.015   pH, UA 8.0 6.0   Protein, UA Negative Negative   Bacteria Occasional Rare   Nitrite, UA " Negative Negative   Leukocytes, UA 1+ A Negative   Urobilinogen, UA Negative  --       LIPIDS:  Recent Labs   Lab 12/02/19  0932 10/26/20  0909 11/09/21  0843   TSH  --  1.130 0.895   HDL 42 38 L 40   Cholesterol 175 190 198   Triglycerides 170 H 251 H 229 H   LDL Cholesterol 99.0 101.8 112.2   HDL/Cholesterol Ratio 24.0 20.0 20.2   Non-HDL Cholesterol 133 152 158   Total Cholesterol/HDL Ratio 4.2 5.0 5.0     TSH:  Recent Labs   Lab 10/26/20  0909 11/09/21  0843   TSH 1.130 0.895     A1C:  Recent Labs   Lab 08/22/19  0830   Hemoglobin A1C 5.4       Assessment/Plan     Natasha Espinal is a 56 y.o.female with:    1. Essential hypertension  Continue current meds.    2. Mixed hyperlipidemia  Update labs.  3. History of breast cancer  F/U with oncology.    4. Anxiety  Continue current meds.    5. Tracheal stenosis  Doing well s/p ENT procedure.    Chronic conditions status updated as per HPI.  Other than changes above, cont current medications and maintain follow up with specialists.  Follow up in about 6 months (around 11/2/2022) for Follow up visit.    Future Appointments   Date Time Provider Department Center   6/21/2022  1:00 PM [facility]  [facility]

## 2022-06-14 ENCOUNTER — CLINICAL SUPPORT (OUTPATIENT)
Dept: INTERNAL MEDICINE | Facility: CLINIC | Age: 57
End: 2022-06-14
Payer: COMMERCIAL

## 2022-06-14 ENCOUNTER — PATIENT MESSAGE (OUTPATIENT)
Dept: INTERNAL MEDICINE | Facility: CLINIC | Age: 57
End: 2022-06-14
Payer: COMMERCIAL

## 2022-06-14 VITALS — HEART RATE: 84 BPM | SYSTOLIC BLOOD PRESSURE: 138 MMHG | DIASTOLIC BLOOD PRESSURE: 86 MMHG

## 2022-06-14 PROCEDURE — 99999 PR PBB SHADOW E&M-EST. PATIENT-LVL I: CPT | Mod: PBBFAC,,,

## 2022-06-14 PROCEDURE — 99999 PR PBB SHADOW E&M-EST. PATIENT-LVL I: ICD-10-PCS | Mod: PBBFAC,,,

## 2022-06-14 NOTE — PROGRESS NOTES
Pt came in for BP check, BP has been running higher than normal (see MyChart message). Home BP machine was 145-96 HR 84. Manual BP was 138/86 HR 84. PT taking Olmesartan-HCTZ 40-12.5mg 1 po qd and Amlodipine 10mg 1 po qd. Can you address for Dr Robles pt?

## 2022-06-15 ENCOUNTER — PATIENT MESSAGE (OUTPATIENT)
Dept: INTERNAL MEDICINE | Facility: CLINIC | Age: 57
End: 2022-06-15
Payer: COMMERCIAL

## 2022-06-15 RX ORDER — HYDROCHLOROTHIAZIDE 12.5 MG/1
12.5 TABLET ORAL DAILY
Qty: 30 TABLET | Refills: 0 | Status: SHIPPED | OUTPATIENT
Start: 2022-06-15 | End: 2022-06-16 | Stop reason: SDUPTHER

## 2022-06-15 NOTE — TELEPHONE ENCOUNTER
We will add HCTZ 12.5 to her BP meds to equal 40 olmesartan, 25 hydrochlorathiazide and 10 amlodipine.  Continue to monitor BP, low salt and stay hydrated with water    RTC on Monday for BP check

## 2022-06-16 RX ORDER — HYDROCHLOROTHIAZIDE 12.5 MG/1
12.5 TABLET ORAL DAILY
Qty: 30 TABLET | Refills: 0 | Status: SHIPPED | OUTPATIENT
Start: 2022-06-16 | End: 2022-07-08

## 2022-06-16 NOTE — TELEPHONE ENCOUNTER
No new care gaps identified.  [facility] Embedded Care Gaps. Reference number: [id number]. 6/16/2022   8:24:30 AM CDT

## 2022-06-16 NOTE — TELEPHONE ENCOUNTER
Pended refill to go to [facility], looks like it was sent to the closed one in [place].

## 2022-06-20 ENCOUNTER — CLINICAL SUPPORT (OUTPATIENT)
Dept: INTERNAL MEDICINE | Facility: CLINIC | Age: 57
End: 2022-06-20
Payer: COMMERCIAL

## 2022-06-20 ENCOUNTER — PATIENT MESSAGE (OUTPATIENT)
Dept: INTERNAL MEDICINE | Facility: CLINIC | Age: 57
End: 2022-06-20

## 2022-06-20 VITALS — SYSTOLIC BLOOD PRESSURE: 132 MMHG | DIASTOLIC BLOOD PRESSURE: 80 MMHG

## 2022-06-20 PROCEDURE — 99999 PR PBB SHADOW E&M-EST. PATIENT-LVL I: ICD-10-PCS | Mod: PBBFAC,,,

## 2022-06-20 PROCEDURE — 99999 PR PBB SHADOW E&M-EST. PATIENT-LVL I: CPT | Mod: PBBFAC,,,

## 2022-06-21 ENCOUNTER — OFFICE VISIT (OUTPATIENT)
Dept: OTOLARYNGOLOGY | Facility: CLINIC | Age: 57
End: 2022-06-21
Payer: COMMERCIAL

## 2022-06-21 VITALS — HEART RATE: 86 BPM | SYSTOLIC BLOOD PRESSURE: 137 MMHG | DIASTOLIC BLOOD PRESSURE: 86 MMHG

## 2022-06-21 DIAGNOSIS — J39.8 TRACHEAL STENOSIS: Primary | ICD-10-CM

## 2022-06-21 PROCEDURE — 99999 PR PBB SHADOW E&M-EST. PATIENT-LVL III: ICD-10-PCS | Mod: PBBFAC,,, | Performed by: NURSE PRACTITIONER

## 2022-06-21 PROCEDURE — 31575 DIAGNOSTIC LARYNGOSCOPY: CPT | Mod: S$GLB,,, | Performed by: NURSE PRACTITIONER

## 2022-06-21 PROCEDURE — 3079F PR MOST RECENT DIASTOLIC BLOOD PRESSURE 80-89 MM HG: ICD-10-PCS | Mod: CPTII,S$GLB,, | Performed by: NURSE PRACTITIONER

## 2022-06-21 PROCEDURE — 1159F PR MEDICATION LIST DOCUMENTED IN MEDICAL RECORD: ICD-10-PCS | Mod: CPTII,S$GLB,, | Performed by: NURSE PRACTITIONER

## 2022-06-21 PROCEDURE — 1159F MED LIST DOCD IN RCRD: CPT | Mod: CPTII,S$GLB,, | Performed by: NURSE PRACTITIONER

## 2022-06-21 PROCEDURE — 99999 PR PBB SHADOW E&M-EST. PATIENT-LVL III: CPT | Mod: PBBFAC,,, | Performed by: NURSE PRACTITIONER

## 2022-06-21 PROCEDURE — 99213 OFFICE O/P EST LOW 20 MIN: CPT | Mod: 25,S$GLB,, | Performed by: NURSE PRACTITIONER

## 2022-06-21 PROCEDURE — 99213 PR OFFICE/OUTPT VISIT, EST, LEVL III, 20-29 MIN: ICD-10-PCS | Mod: 25,S$GLB,, | Performed by: NURSE PRACTITIONER

## 2022-06-21 PROCEDURE — 3079F DIAST BP 80-89 MM HG: CPT | Mod: CPTII,S$GLB,, | Performed by: NURSE PRACTITIONER

## 2022-06-21 PROCEDURE — 3075F SYST BP GE 130 - 139MM HG: CPT | Mod: CPTII,S$GLB,, | Performed by: NURSE PRACTITIONER

## 2022-06-21 PROCEDURE — 3075F PR MOST RECENT SYSTOLIC BLOOD PRESS GE 130-139MM HG: ICD-10-PCS | Mod: CPTII,S$GLB,, | Performed by: NURSE PRACTITIONER

## 2022-06-21 PROCEDURE — 31575 PR LARYNGOSCOPY, FLEXIBLE; DIAGNOSTIC: ICD-10-PCS | Mod: S$GLB,,, | Performed by: NURSE PRACTITIONER

## 2022-06-21 NOTE — ASSESSMENT & PLAN NOTE
Doing well, airway widely patent. We discussed continued monitoring. She will RTC in 3 months, sooner with any breathing changes. Questions answered.

## 2022-06-21 NOTE — PROGRESS NOTES
"[facility]  Department of Otorhinolaryngology and Communication Sciences    Natasha Espinal is a 56 y.o. female who returns for a follow up visit. Her breathing has significantly improved since surgery. She does not have any wheezing or stridor.   She has pain to her left mandible since surgery. Her teeth hurt as well.  It is worse if she eats solid/hard foods.    She initially presented to the [facility] for consultation at the kind request of Dr. Porter Castaneda for further management of tracheal stenosis.    She complains of mild dyspnea and wheezing.  She has tracheal stenosis, ultimately identified in May 2018 when she was found to be a very difficult intubation for breast reconstruction surgery. She was connected to Dr. Ridley, who brought her for endoscopic airway intervention in Sept 2018. She had done well since then but was lost to follow up due to high out of pocket costs with her insurance plan.     Around last summer, she noticed exertional dyspnea an noisy breathing when she was trying to hike in the mountains. Since then she has been aware of mild exertional dyspnea, phonatory dyspnea, and mild inspiratory airway noise. Symptoms are stable and not deteriorating. Symptoms are nonresponsive to bronchodilator.    She had PFTs with pulm recently  FVL 11/29/21  Truncation and flattening of expiratory flow volume loop; some truncation of inspiratory loop  "No convincing evidence of reactive airway disease. No bronchodilator response. PFT not consistent with asthma."    Has a prior longstanding history of bad sinus problems, needed sinus surgery  No personal or family history of rheumatologic disorders.    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      Dyspnea Index Score 1/25/2022   Dyspnea Index Total Score  20     No flowsheet data found.    Past Medical History  She has a past medical history of Allergy, Asthma, Breast cancer, and Hypertension.    Past Surgical History  She has a past surgical " history that includes Hysterectomy (2011); Oophorectomy; deviated septum repair; Mastectomy (Bilateral, 02/2018); Breast biopsy (Left, 12/2017); Breast biopsy (Left, 1983); Breast reconstruction (Bilateral); scar tissue removal in airway  (Bilateral, 08/01/2018); Knee surgery (Left, 01/06/2020); Sinus surgery; Direct laryngobronchoscopy (N/A, 2/17/2022); Dilation of trachea (2/17/2022); and Injection of steroid (2/17/2022).    Family History  Her family history includes Arthritis in her father and mother; Asthma in her brother and mother; Breast cancer (age of onset: 43) in her paternal aunt; Breast cancer (age of onset: 55) in her paternal aunt; Breast cancer (age of onset: 62) in her paternal aunt; COPD in her father; Colon cancer in her paternal uncle; Hearing loss in her father; Heart disease in her father.    Social History  She reports that she has never smoked. She has never used smokeless tobacco. She reports that she does not drink alcohol and does not use drugs.    Allergies  She is allergic to naproxen and sulfa (sulfonamide antibiotics).    Medications  She has a current medication list which includes the following prescription(s): diphenhydramine hcl, albuterol, amlodipine, anastrozole, ascorbate calcium (vitamin c), aspirin, cetirizine, cholecalciferol (vitamin d3), famotidine, fish oil-omega-3 fatty acids, fluticasone propionate, hydrochlorothiazide, hydroxyzine hcl, melatonin, multivit-iron-min-folic acid, multivitamin/iron/folic acid, olmesartan-hydrochlorothiazide, intrarosa, and trazodone.    Review of Systems   Constitutional: Negative.  Negative for fever.   HENT: Negative.  Negative for mouth sores, postnasal drip, sinus pressure, sore throat, trouble swallowing and voice change.    Eyes: Negative.  Negative for visual disturbance.   Respiratory: Negative for cough, shortness of breath and wheezing.    Cardiovascular: Negative.  Negative for chest pain.   Gastrointestinal: Negative.  Negative  for nausea.   Endocrine: Negative.    Genitourinary: Negative.    Musculoskeletal: Negative.  Negative for arthralgias.   Skin: Negative.  Negative for rash.   Allergic/Immunologic: Negative.    Neurological: Negative.  Negative for tremors.   Hematological: Negative.  Does not bruise/bleed easily.   Psychiatric/Behavioral: Negative.  The patient is not nervous/anxious.           Objective:     /86 (Patient Position: Sitting)   Pulse 86      Physical Exam  Constitutional: comfortable, well dressed  Psychiatric: appropriate affect  Respiratory: comfortably breathing, symmetric chest rise, no stridor  Voice: faint inconsistent inspiratory stridor  Cardiovascular: upper extremities non-edematous  Lymphatic: no cervical lymphadenopathy  Neurologic: alert and oriented to time, place, person, and situation; cranial nerves 3-12 grossly intact  Head: normocephalic  Eyes: conjunctivae and sclerae clear  Ears: normal pinnae, normal external auditory canals, tympanic membranes intact  Nose: mucosa pink and noncongested, no masses, no mucopurulence, no polyps  Oral cavity / oropharynx: no mucosal lesions  Neck: soft, full range of motion, laryngotracheal complex palpable with appropriate landmarks, larynx elevates on swallowing  Indirect laryngoscopy: limited due to gag    Procedure  Flexible Laryngoscopy (88202): Laryngoscopy is indicated for assessment of upper aerodigestive structure and function. This was carried out transnasally with a [employer] chip videoendoscope. After verbal consent was obtained, the patient was positioned and the nose was topically decongested with 1% phenylephrine and topically anesthetized with 4% lidocaine. The endoscope was passed through the most patent nasal cavity and positioned to image the nasopharynx, larynx, and hypopharynx in detail. The following features were examined: nasopharyngeal, laryngeal, hypopharyngeal masses; velopharyngeal strength, closure, and symmetry of motion; vocal  fold range and symmetry of motion; laryngeal mucosal edema, erythema, inflammation, and hydration; salivary pooling; and gross laryngeal sensation. The equipment was removed. The patient tolerated the procedure well without complication. All findings were normal except:  - significant improvement in airway.    Assessment:     Natasha Espinal is a 56 y.o. female with chronic tracheal stenosis, suspected to be idiopathic. Now 3 months s/p suspension microlaryngoscopy, CO2 laser excision, steroid injection, balloon dilation.       Plan:        Problem List Items Addressed This Visit        Pulmonary    Tracheal stenosis - Primary     Doing well, airway widely patent. We discussed continued monitoring. She will RTC in 3 months, sooner with any breathing changes. Questions answered.

## 2022-08-03 ENCOUNTER — PATIENT MESSAGE (OUTPATIENT)
Dept: INTERNAL MEDICINE | Facility: CLINIC | Age: 57
End: 2022-08-03
Payer: COMMERCIAL

## 2022-09-20 ENCOUNTER — OFFICE VISIT (OUTPATIENT)
Dept: OTOLARYNGOLOGY | Facility: CLINIC | Age: 57
End: 2022-09-20
Payer: COMMERCIAL

## 2022-09-20 VITALS — DIASTOLIC BLOOD PRESSURE: 72 MMHG | HEART RATE: 94 BPM | SYSTOLIC BLOOD PRESSURE: 103 MMHG

## 2022-09-20 DIAGNOSIS — J39.8 TRACHEAL STENOSIS: Primary | ICD-10-CM

## 2022-09-20 PROCEDURE — 99214 OFFICE O/P EST MOD 30 MIN: CPT | Mod: 25,S$GLB,, | Performed by: NURSE PRACTITIONER

## 2022-09-20 PROCEDURE — 31575 DIAGNOSTIC LARYNGOSCOPY: CPT | Mod: S$GLB,,, | Performed by: NURSE PRACTITIONER

## 2022-09-20 PROCEDURE — 3074F PR MOST RECENT SYSTOLIC BLOOD PRESSURE < 130 MM HG: ICD-10-PCS | Mod: CPTII,S$GLB,, | Performed by: NURSE PRACTITIONER

## 2022-09-20 PROCEDURE — 3078F PR MOST RECENT DIASTOLIC BLOOD PRESSURE < 80 MM HG: ICD-10-PCS | Mod: CPTII,S$GLB,, | Performed by: NURSE PRACTITIONER

## 2022-09-20 PROCEDURE — 3074F SYST BP LT 130 MM HG: CPT | Mod: CPTII,S$GLB,, | Performed by: NURSE PRACTITIONER

## 2022-09-20 PROCEDURE — 99214 PR OFFICE/OUTPT VISIT, EST, LEVL IV, 30-39 MIN: ICD-10-PCS | Mod: 25,S$GLB,, | Performed by: NURSE PRACTITIONER

## 2022-09-20 PROCEDURE — 99999 PR PBB SHADOW E&M-EST. PATIENT-LVL III: ICD-10-PCS | Mod: PBBFAC,,, | Performed by: NURSE PRACTITIONER

## 2022-09-20 PROCEDURE — 3078F DIAST BP <80 MM HG: CPT | Mod: CPTII,S$GLB,, | Performed by: NURSE PRACTITIONER

## 2022-09-20 PROCEDURE — 31575 PR LARYNGOSCOPY, FLEXIBLE; DIAGNOSTIC: ICD-10-PCS | Mod: S$GLB,,, | Performed by: NURSE PRACTITIONER

## 2022-09-20 PROCEDURE — 99999 PR PBB SHADOW E&M-EST. PATIENT-LVL III: CPT | Mod: PBBFAC,,, | Performed by: NURSE PRACTITIONER

## 2022-09-20 PROCEDURE — 1159F PR MEDICATION LIST DOCUMENTED IN MEDICAL RECORD: ICD-10-PCS | Mod: CPTII,S$GLB,, | Performed by: NURSE PRACTITIONER

## 2022-09-20 PROCEDURE — 1159F MED LIST DOCD IN RCRD: CPT | Mod: CPTII,S$GLB,, | Performed by: NURSE PRACTITIONER

## 2022-09-20 NOTE — ASSESSMENT & PLAN NOTE
Doing well, airway widely patent. We discussed continued monitoring. She will RTC in 6 months, sooner with any breathing changes. Questions answered.

## 2022-09-20 NOTE — PROGRESS NOTES
"[facility]  Department of Otorhinolaryngology and Communication Sciences    Natasha Espinal is a 56 y.o. female who returns for a follow up visit. Her breathing has significantly improved since surgery. She does not have any wheezing or stridor.   She has pain to her left mandible since surgery. Her teeth hurt as well.  It is worse if she eats solid/hard foods.    She initially presented to the [facility] for consultation at the kind request of Dr. Porter Castaneda for further management of tracheal stenosis.    She complained of mild dyspnea and wheezing.  She has tracheal stenosis, ultimately identified in May 2018 when she was found to be a very difficult intubation for breast reconstruction surgery. She was connected to Dr. Ridley, who brought her for endoscopic airway intervention in Sept 2018. She had done well since then but was lost to follow up due to high out of pocket costs with her insurance plan.     Around last summer, she noticed exertional dyspnea an noisy breathing when she was trying to hike in the mountains. Since then she has been aware of mild exertional dyspnea, phonatory dyspnea, and mild inspiratory airway noise. Symptoms are stable and not deteriorating. Symptoms are nonresponsive to bronchodilator.    She had PFTs with pulm recently  FVL 11/29/21  Truncation and flattening of expiratory flow volume loop; some truncation of inspiratory loop  "No convincing evidence of reactive airway disease. No bronchodilator response. PFT not consistent with asthma."    Has a prior longstanding history of bad sinus problems, needed sinus surgery  No personal or family history of rheumatologic disorders.    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      Dyspnea Index Score 1/25/2022   Dyspnea Index Total Score  20     No flowsheet data found.    Past Medical History  She has a past medical history of Allergy, Asthma, Breast cancer, and Hypertension.    Past Surgical History  She has a past surgical " history that includes Hysterectomy (2011); Oophorectomy; deviated septum repair; Mastectomy (Bilateral, 02/2018); Breast biopsy (Left, 12/2017); Breast biopsy (Left, 1983); Breast reconstruction (Bilateral); scar tissue removal in airway  (Bilateral, 08/01/2018); Knee surgery (Left, 01/06/2020); Sinus surgery; Direct laryngobronchoscopy (N/A, 2/17/2022); Dilation of trachea (2/17/2022); and Injection of steroid (2/17/2022).    Family History  Her family history includes Arthritis in her father and mother; Asthma in her brother and mother; Breast cancer (age of onset: 43) in her paternal aunt; Breast cancer (age of onset: 55) in her paternal aunt; Breast cancer (age of onset: 62) in her paternal aunt; COPD in her father; Colon cancer in her paternal uncle; Hearing loss in her father; Heart disease in her father.    Social History  She reports that she has never smoked. She has never used smokeless tobacco. She reports that she does not drink alcohol and does not use drugs.    Allergies  She is allergic to naproxen and sulfa (sulfonamide antibiotics).    Medications  She has a current medication list which includes the following prescription(s): diphenhydramine hcl, albuterol, amlodipine, anastrozole, ascorbate calcium (vitamin c), aspirin, cetirizine, cholecalciferol (vitamin d3), famotidine, fish oil-omega-3 fatty acids, fluticasone propionate, hydrochlorothiazide, hydroxyzine hcl, melatonin, multivit-iron-min-folic acid, multivitamin/iron/folic acid, olmesartan-hydrochlorothiazide, intrarosa, and trazodone.    Review of Systems   Constitutional: Negative.  Negative for fever.   HENT: Negative.  Negative for mouth sores, postnasal drip, sinus pressure, sore throat, trouble swallowing and voice change.    Eyes: Negative.  Negative for visual disturbance.   Respiratory:  Negative for cough, shortness of breath and wheezing.    Cardiovascular: Negative.  Negative for chest pain.   Gastrointestinal: Negative.  Negative  for nausea.   Endocrine: Negative.    Genitourinary: Negative.    Musculoskeletal: Negative.  Negative for arthralgias.   Skin: Negative.  Negative for rash.   Allergic/Immunologic: Negative.    Neurological: Negative.  Negative for tremors.   Hematological: Negative.  Does not bruise/bleed easily.   Psychiatric/Behavioral: Negative.  The patient is not nervous/anxious.         Objective:     /72 (Patient Position: Sitting)   Pulse 94      Physical Exam  Constitutional: comfortable, well dressed  Psychiatric: appropriate affect  Respiratory: comfortably breathing, symmetric chest rise, no stridor  Voice: faint inconsistent inspiratory stridor  Cardiovascular: upper extremities non-edematous  Lymphatic: no cervical lymphadenopathy  Neurologic: alert and oriented to time, place, person, and situation; cranial nerves 3-12 grossly intact  Head: normocephalic  Eyes: conjunctivae and sclerae clear  Ears: normal pinnae, normal external auditory canals, tympanic membranes intact  Nose: mucosa pink and noncongested, no masses, no mucopurulence, no polyps  Oral cavity / oropharynx: no mucosal lesions  Neck: soft, full range of motion, laryngotracheal complex palpable with appropriate landmarks, larynx elevates on swallowing  Indirect laryngoscopy: limited due to gag    Procedure  Flexible Laryngoscopy (79307): Laryngoscopy is indicated for assessment of upper aerodigestive structure and function. This was carried out transnasally with a [employer] chip videoendoscope. After verbal consent was obtained, the patient was positioned and the nose was topically decongested with 1% phenylephrine and topically anesthetized with 4% lidocaine. The endoscope was passed through the most patent nasal cavity and positioned to image the nasopharynx, larynx, and hypopharynx in detail. The following features were examined: nasopharyngeal, laryngeal, hypopharyngeal masses; velopharyngeal strength, closure, and symmetry of motion; vocal fold  range and symmetry of motion; laryngeal mucosal edema, erythema, inflammation, and hydration; salivary pooling; and gross laryngeal sensation. The equipment was removed. The patient tolerated the procedure well without complication. All findings were normal except:  - significant improvement in airway, stable compared to previous exam    Assessment:     Natasha Espinal is a 56 y.o. female with chronic tracheal stenosis, suspected to be idiopathic. Now 7 months s/p suspension microlaryngoscopy, CO2 laser excision, steroid injection, balloon dilation.       Plan:        Problem List Items Addressed This Visit          Pulmonary    Tracheal stenosis - Primary     Doing well, airway widely patent. We discussed continued monitoring. She will RTC in 6 months, sooner with any breathing changes. Questions answered.

## 2022-10-10 ENCOUNTER — OFFICE VISIT (OUTPATIENT)
Dept: HEMATOLOGY/ONCOLOGY | Facility: CLINIC | Age: 57
End: 2022-10-10
Payer: COMMERCIAL

## 2022-10-10 VITALS
BODY MASS INDEX: 33.73 KG/M2 | TEMPERATURE: 98 F | WEIGHT: 167.31 LBS | SYSTOLIC BLOOD PRESSURE: 145 MMHG | HEART RATE: 89 BPM | OXYGEN SATURATION: 96 % | DIASTOLIC BLOOD PRESSURE: 81 MMHG | RESPIRATION RATE: 18 BRPM | HEIGHT: 59 IN

## 2022-10-10 DIAGNOSIS — C50.612 CARCINOMA OF AXILLARY TAIL OF LEFT BREAST IN FEMALE, ESTROGEN RECEPTOR POSITIVE: Primary | ICD-10-CM

## 2022-10-10 DIAGNOSIS — Z17.0 CARCINOMA OF AXILLARY TAIL OF LEFT BREAST IN FEMALE, ESTROGEN RECEPTOR POSITIVE: Primary | ICD-10-CM

## 2022-10-10 PROCEDURE — 3079F DIAST BP 80-89 MM HG: CPT | Mod: CPTII,S$GLB,, | Performed by: INTERNAL MEDICINE

## 2022-10-10 PROCEDURE — 1160F RVW MEDS BY RX/DR IN RCRD: CPT | Mod: CPTII,S$GLB,, | Performed by: INTERNAL MEDICINE

## 2022-10-10 PROCEDURE — 3008F BODY MASS INDEX DOCD: CPT | Mod: CPTII,S$GLB,, | Performed by: INTERNAL MEDICINE

## 2022-10-10 PROCEDURE — 99999 PR PBB SHADOW E&M-EST. PATIENT-LVL V: ICD-10-PCS | Mod: PBBFAC,,, | Performed by: INTERNAL MEDICINE

## 2022-10-10 PROCEDURE — 99999 PR PBB SHADOW E&M-EST. PATIENT-LVL V: CPT | Mod: PBBFAC,,, | Performed by: INTERNAL MEDICINE

## 2022-10-10 PROCEDURE — 1160F PR REVIEW ALL MEDS BY PRESCRIBER/CLIN PHARMACIST DOCUMENTED: ICD-10-PCS | Mod: CPTII,S$GLB,, | Performed by: INTERNAL MEDICINE

## 2022-10-10 PROCEDURE — 3008F PR BODY MASS INDEX (BMI) DOCUMENTED: ICD-10-PCS | Mod: CPTII,S$GLB,, | Performed by: INTERNAL MEDICINE

## 2022-10-10 PROCEDURE — 3077F SYST BP >= 140 MM HG: CPT | Mod: CPTII,S$GLB,, | Performed by: INTERNAL MEDICINE

## 2022-10-10 PROCEDURE — 99214 PR OFFICE/OUTPT VISIT, EST, LEVL IV, 30-39 MIN: ICD-10-PCS | Mod: S$GLB,,, | Performed by: INTERNAL MEDICINE

## 2022-10-10 PROCEDURE — 3079F PR MOST RECENT DIASTOLIC BLOOD PRESSURE 80-89 MM HG: ICD-10-PCS | Mod: CPTII,S$GLB,, | Performed by: INTERNAL MEDICINE

## 2022-10-10 PROCEDURE — 1159F MED LIST DOCD IN RCRD: CPT | Mod: CPTII,S$GLB,, | Performed by: INTERNAL MEDICINE

## 2022-10-10 PROCEDURE — 99214 OFFICE O/P EST MOD 30 MIN: CPT | Mod: S$GLB,,, | Performed by: INTERNAL MEDICINE

## 2022-10-10 PROCEDURE — 1159F PR MEDICATION LIST DOCUMENTED IN MEDICAL RECORD: ICD-10-PCS | Mod: CPTII,S$GLB,, | Performed by: INTERNAL MEDICINE

## 2022-10-10 PROCEDURE — 3077F PR MOST RECENT SYSTOLIC BLOOD PRESSURE >= 140 MM HG: ICD-10-PCS | Mod: CPTII,S$GLB,, | Performed by: INTERNAL MEDICINE

## 2022-10-10 NOTE — PROGRESS NOTES
Subjective:       Patient ID: Natasha Espinal is a 56 y.o. female.    Chief Complaint: Carcinoma of axillary tail of left breast in female, estrog    HPI     Mrs. Espinal returns today for follow-up.  I would last seen her in April 2022.  Of note, she has been on anastrazole since March 2018, and so far has tolerated it OK.      Briefly, she is a 56-year-old  female who was referred for evaluation after undergoing bilateral nipple-sparing mastectomies.   On the left nipple-sparing mastectomy specimen, there was a 3.4 cm tumor.  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  There was no evidence of DCIS or malignancy on the right mastectomy specimen.  A MammaPrint test was sent and suggested that she had a 97.8% chance of disease free survival at five years with hormonal therapy.    Her DXA scan on on November 5, 2021 had shown mild osteopenia of the spine and low normal density of the hip.  She was recently evaluated by the Pulmonary service for asthma, however, her PFTs were suggestive of extrathoracic tracheal stenosis.  She  underwent a tracheal dilatation with immediate improvement of her symptoms.    Review of Systems      Overall she feels OK.  Her shortness of breath all but resolved after the tracheal dilatation.  She does complain of hot flashes and occasional joint stiffness involving primarily the hands, wrists, and elbows.  ECOG PS is 1.  She denies any depression, easy bruising, fevers, chills, night  sweats, weight loss, nausea, vomiting, diarrhea, constipation, diplopia, blurred vision, headache, chest pain, palpitations, shortness of breath, breast pain, abdominal pain, extremity pain, or difficulty ambulating.  The remainder of the ten-point ROS, including general, skin, lymph, H/N, cardiorespiratory, GI, , Neuro, Endocrine, and psychiatric is negative.       Objective:      Physical Exam        She is alert, oriented to time, place, person, pleasant, well  nourished, in no acute physical distress.                                VITAL SIGNS:  Reviewed.                                   HEENT:  Normal.  There are no nasal, oral, lip, gingival, auricular, lid,    or conjunctival lesions.  Mucosae are moist and pink, and there is no        thrush.  Pupils are equal, reactive to light and accommodation.              Extraocular muscle movements are intact.  Dentition is good.  There is no frontal or maxillary tenderness.                                     NECK:  Supple without JVD, adenopathy, or thyromegaly.                       LUNGS:  Clear to auscultation without wheezing, rales, or rhonchi.           CARDIOVASCULAR:  Reveals an S1, S2, no murmurs, no rubs, no gallops.         ABDOMEN:  Soft, nontender, without organomegaly.  Bowel sounds are    present.   Her SHRUTHI flap incision has healed nicely.                                                                 EXTREMITIES:  No cyanosis, clubbing, or edema.                             BREASTS:  She is status post bilateral nipples paring mastectomies.     Her incisions are well healed and no masses are palpated.                               LYMPHATIC:  There is no cervical, axillary, or supraclavicular adenopathy.   SKIN:  Warm and moist, without petechiae, rashes, induration, or ecchymoses.           NEUROLOGIC:  DTRs are 0-1+ bilaterally, symmetrical, motor function is 5/5,  and cranial nerves are  within normal limits.    Assessment:       1. Carcinoma of axillary tail of left breast in female, estrogen receptor positive    2. Use of aromatase inhibitors         3.  Extrathoracic Tracheal stenosis        4. Musculoskeletal complaints secondary to anastrozole  Plan:        I had a long discussion with her.  She will remain on anastrazole through the end of March 2023 and see me again in 6 months.  In March 2023 we will discuss the option of extending her adjuvant hormonal therapy to 7 years.  In regards to her  tracheal stenosis, she is asymptomatic and her next appointment with ENT is in February 2023..  Her DEXA scan will be repeated in November 2023.    Her multiple questions were answered to her satisfaction.  Route Chart for Scheduling    Med Onc Chart Routing      Follow up with physician 6 months. See me in 6 months.  No labs.   Follow up with JAYASHREE    Infusion scheduling note    Injection scheduling note    Labs    Imaging    Pharmacy appointment    Other referrals

## 2022-11-02 ENCOUNTER — DOCUMENTATION ONLY (OUTPATIENT)
Dept: INTERNAL MEDICINE | Facility: CLINIC | Age: 57
End: 2022-11-02
Payer: COMMERCIAL

## 2022-11-02 ENCOUNTER — OFFICE VISIT (OUTPATIENT)
Dept: INTERNAL MEDICINE | Facility: CLINIC | Age: 57
End: 2022-11-02
Payer: COMMERCIAL

## 2022-11-02 VITALS
SYSTOLIC BLOOD PRESSURE: 100 MMHG | WEIGHT: 168.44 LBS | BODY MASS INDEX: 33.96 KG/M2 | HEIGHT: 59 IN | OXYGEN SATURATION: 96 % | HEART RATE: 94 BPM | DIASTOLIC BLOOD PRESSURE: 60 MMHG

## 2022-11-02 DIAGNOSIS — Z12.11 SCREEN FOR COLON CANCER: ICD-10-CM

## 2022-11-02 DIAGNOSIS — Z00.00 ANNUAL PHYSICAL EXAM: Primary | ICD-10-CM

## 2022-11-02 DIAGNOSIS — E66.9 OBESITY, UNSPECIFIED CLASSIFICATION, UNSPECIFIED OBESITY TYPE, UNSPECIFIED WHETHER SERIOUS COMORBIDITY PRESENT: ICD-10-CM

## 2022-11-02 DIAGNOSIS — F41.9 ANXIETY: ICD-10-CM

## 2022-11-02 DIAGNOSIS — E88.810 METABOLIC SYNDROME: ICD-10-CM

## 2022-11-02 DIAGNOSIS — E78.2 MIXED HYPERLIPIDEMIA: ICD-10-CM

## 2022-11-02 DIAGNOSIS — R73.01 IMPAIRED FASTING GLUCOSE: ICD-10-CM

## 2022-11-02 DIAGNOSIS — F51.01 PRIMARY INSOMNIA: ICD-10-CM

## 2022-11-02 DIAGNOSIS — Z85.3 HISTORY OF BREAST CANCER: ICD-10-CM

## 2022-11-02 DIAGNOSIS — I10 ESSENTIAL HYPERTENSION: ICD-10-CM

## 2022-11-02 PROCEDURE — 99999 PR PBB SHADOW E&M-EST. PATIENT-LVL IV: ICD-10-PCS | Mod: PBBFAC,,, | Performed by: INTERNAL MEDICINE

## 2022-11-02 PROCEDURE — 99396 PREV VISIT EST AGE 40-64: CPT | Mod: S$GLB,,, | Performed by: INTERNAL MEDICINE

## 2022-11-02 PROCEDURE — 3074F SYST BP LT 130 MM HG: CPT | Mod: CPTII,S$GLB,, | Performed by: INTERNAL MEDICINE

## 2022-11-02 PROCEDURE — 3078F DIAST BP <80 MM HG: CPT | Mod: CPTII,S$GLB,, | Performed by: INTERNAL MEDICINE

## 2022-11-02 PROCEDURE — 99396 PR PREVENTIVE VISIT,EST,40-64: ICD-10-PCS | Mod: S$GLB,,, | Performed by: INTERNAL MEDICINE

## 2022-11-02 PROCEDURE — 3008F BODY MASS INDEX DOCD: CPT | Mod: CPTII,S$GLB,, | Performed by: INTERNAL MEDICINE

## 2022-11-02 PROCEDURE — 3074F PR MOST RECENT SYSTOLIC BLOOD PRESSURE < 130 MM HG: ICD-10-PCS | Mod: CPTII,S$GLB,, | Performed by: INTERNAL MEDICINE

## 2022-11-02 PROCEDURE — 99999 PR PBB SHADOW E&M-EST. PATIENT-LVL IV: CPT | Mod: PBBFAC,,, | Performed by: INTERNAL MEDICINE

## 2022-11-02 PROCEDURE — 3078F PR MOST RECENT DIASTOLIC BLOOD PRESSURE < 80 MM HG: ICD-10-PCS | Mod: CPTII,S$GLB,, | Performed by: INTERNAL MEDICINE

## 2022-11-02 PROCEDURE — 1159F PR MEDICATION LIST DOCUMENTED IN MEDICAL RECORD: ICD-10-PCS | Mod: CPTII,S$GLB,, | Performed by: INTERNAL MEDICINE

## 2022-11-02 PROCEDURE — 1159F MED LIST DOCD IN RCRD: CPT | Mod: CPTII,S$GLB,, | Performed by: INTERNAL MEDICINE

## 2022-11-02 PROCEDURE — 3008F PR BODY MASS INDEX (BMI) DOCUMENTED: ICD-10-PCS | Mod: CPTII,S$GLB,, | Performed by: INTERNAL MEDICINE

## 2022-11-02 RX ORDER — SEMAGLUTIDE 0.5 MG/.5ML
0.5 INJECTION, SOLUTION SUBCUTANEOUS
Qty: 2 ML | Refills: 3 | Status: SHIPPED | OUTPATIENT
Start: 2022-11-02 | End: 2023-03-15

## 2022-11-02 RX ORDER — SEMAGLUTIDE 0.5 MG/.5ML
0.5 INJECTION, SOLUTION SUBCUTANEOUS
Qty: 2 ML | Refills: 3 | Status: SHIPPED | OUTPATIENT
Start: 2022-11-02 | End: 2022-11-02

## 2022-11-02 NOTE — PROGRESS NOTES
[facility] Note    Chief Complaint      Chief Complaint   Patient presents with    Follow-up     6 month f/u       History of Present Illness      Natasha Espinal is a 56 y.o. female with chronic conditions of HTN, HLD, hx of breast cancer, asthma, anxiety, insomnia who presents today for: follow up chronic conditions.  Trying to lose weight.  Doing well with diet and exercise.  Has not seen any results as of yet.    HTN: BP at goal on amlodipine, olmesartan-hydrochlorothiazide, hctz.    HLD: Controlled off atorvastatin.   last check.  Repeat in 6 months.  Hx of breast cancer: Sees oncology, Dr. Olivier.  On anastrazole, on 4.5/7 years.  Still with myalgias with anastrozole.  No new changes.  Asthma: Has albuterol as needed.  Has not needed rescue since last visit.  Anxiety: Controlled off effexor.  No new or worsening symptoms.    Insomnia: Partially controlled on trazodone.  Doing well on this regimen.     Flu shot UTD.  TdAP 2017.  Shingles vaccine discussed.  Pneumonia vaccine due age 64.  COVID vaccine UTD.  Mammogram N/A bilateral mastectomy.  DEXA 2019, Dr. Boswell.  FIT 11/2021.    Past Medical History:  Past Medical History:   Diagnosis Date    Allergy     Asthma     Breast cancer 12/2017    left     Hypertension        Past Surgical History:   has a past surgical history that includes Hysterectomy (2011); Oophorectomy; deviated septum repair; Mastectomy (Bilateral, 02/2018); Breast biopsy (Left, 12/2017); Breast biopsy (Left, 1983); Breast reconstruction (Bilateral); scar tissue removal in airway  (Bilateral, 08/01/2018); Knee surgery (Left, 01/06/2020); Sinus surgery; Direct laryngobronchoscopy (N/A, 2/17/2022); Dilation of trachea (2/17/2022); and Injection of steroid (2/17/2022).    Family History:  family history includes Arthritis in her father and mother; Asthma in her brother and mother; Breast cancer (age of onset: 43) in her paternal aunt; Breast cancer (age of  onset: 55) in her paternal aunt; Breast cancer (age of onset: 62) in her paternal aunt; COPD in her father; Colon cancer in her paternal uncle; Hearing loss in her father; Heart disease in her father.     Social History:  Social History     Tobacco Use    Smoking status: Never    Smokeless tobacco: Never   Substance Use Topics    Alcohol use: No    Drug use: No       I personally reviewed all past medical, surgical, social and family history.    Review of Systems   Constitutional:  Negative for chills, fever and malaise/fatigue.   Respiratory:  Negative for shortness of breath.    Cardiovascular:  Negative for chest pain.   Gastrointestinal:  Negative for constipation, diarrhea, nausea and vomiting.   Skin:  Negative for rash.   Neurological:  Negative for weakness.   All other systems reviewed and are negative.     Medications:  Outpatient Encounter Medications as of 11/2/2022   Medication Sig Note Dispense Refill    amLODIPine (NORVASC) 10 MG tablet TAKE 1 TABLET BY MOUTH EVERY DAY IN THE EVENING  90 tablet 3    anastrozole (ARIMIDEX) 1 mg Tab TAKE 1 TABLET BY MOUTH EVERY DAY  90 tablet 2    ascorbate calcium 500 mg Tab Take 1 tablet by mouth. 2/11/2022: Hold 1 week prior to surgery          aspirin (ECOTRIN) 81 MG EC tablet Take 81 mg by mouth once daily. 2/11/2022: HOLD UNTIL AFTER SURGERY      cetirizine (ZYRTEC) 10 MG tablet Take 10 mg by mouth once daily.       cholecalciferol, vitamin D3, (VITAMIN D3) 50 mcg (2,000 unit) Cap Take 1 capsule by mouth once daily. 2/11/2022: HOLD UNTIL AFTER SURGERY        famotidine (PEPCID) 20 MG tablet Take 1 tablet (20 mg total) by mouth 2 (two) times daily.  60 tablet 11    fish oil-omega-3 fatty acids 300-1,000 mg capsule Take 1 g by mouth. 2/11/2022: HOLD UNTIL AFTER SURGERY      fluticasone propionate (FLONASE) 50 mcg/actuation nasal spray 1 spray (50 mcg total) by Each Nostril route once daily.  11.1 mL 2    hydroCHLOROthiazide (HYDRODIURIL) 12.5 MG Tab TAKE 1 TABLET BY  MOUTH EVERY DAY  90 tablet 3    hydrOXYzine HCL (ATARAX) 25 MG tablet Take 1 tablet (25 mg total) by mouth 3 (three) times daily as needed for Itching.  60 tablet 1    melatonin 5 mg Tab Take by mouth.       MULTIVIT-IRON-MIN-FOLIC ACID 3,500-18-0.4 UNIT-MG-MG ORAL CHEW Take by mouth once daily. 2/11/2022: HOLD UNTIL AFTER SURGERY      multivitamin/iron/folic acid (CENTRUM WOMEN ORAL) Take by mouth. 2/11/2022: HOLD UNTIL AFTER SURGERY      olmesartan-hydrochlorothiazide (BENICAR HCT) 40-12.5 mg Tab TAKE 1 TABLET BY MOUTH EVERY DAY  90 tablet 2    prasterone, dhea, (INTRAROSA) 6.5 mg Inst Place 6.5 mg vaginally every evening.  30 each 3    semaglutide, weight loss, (WEGOVY) 0.5 mg/0.5 mL PnIj Inject 0.5 mg into the skin every 7 days.  2 mL 3    traZODone (DESYREL) 50 MG tablet Take 1 tablet (50 mg total) by mouth nightly as needed for Insomnia.  90 tablet 3    [DISCONTINUED] (Magic mouthwash) 1:1:1 diphenhydramine(Benadryl) 12.5mg/5ml liq, aluminum & magnesium hydroxide-simethicone (Maalox), LIDOcaine viscous 2% Swish and spit 5 mLs every 4 (four) hours as needed (mouth sores). for mouth sores  360 mL 2    [DISCONTINUED] albuterol (PROVENTIL HFA) 90 mcg/actuation inhaler Inhale 2 puffs into the lungs every 6 (six) hours as needed for Wheezing. Rescue  16 g 3    [DISCONTINUED] semaglutide, weight loss, (WEGOVY) 0.5 mg/0.5 mL PnIj Inject 0.5 mg into the skin every 7 days.  2 mL 3     No facility-administered encounter medications on file as of 11/2/2022.       Allergies:  Review of patient's allergies indicates:   Allergen Reactions    Naproxen Itching    Sulfa (sulfonamide antibiotics) Nausea Only       Health Maintenance:  Immunization History   Administered Date(s) Administered    COVID-19, MRNA, LN-S, PF (Pfizer) (Purple Cap) 02/25/2021, 03/18/2021, 11/09/2021    Influenza (FLUBLOK) - Quadrivalent - Recombinant - PF *Preferred* (egg allergy) 10/05/2020    Influenza - Quadrivalent 11/24/2014    Influenza -  "Quadrivalent - MDCK - PF 10/05/2022    Influenza - Quadrivalent - PF *Preferred* (6 months and older) 08/28/2019, 01/22/2020, 10/22/2021    Tdap 07/31/2017      Health Maintenance   Topic Date Due    TETANUS VACCINE  07/31/2027    Lipid Panel  10/31/2027    Hepatitis C Screening  Completed        Physical Exam      Vital Signs  Pulse: 94  SpO2: 96 %  BP: 100/60  BP Location: Right arm  Patient Position: Sitting  Pain Score: 0-No pain  Height and Weight  Height: 4' 11" (149.9 cm)  Weight: 76.4 kg (168 lb 6.9 oz)  BSA (Calculated - sq m): 1.78 sq meters  BMI (Calculated): 34  Weight in (lb) to have BMI = 25: 123.5]    Physical Exam  Vitals reviewed.   Constitutional:       Appearance: She is well-developed.   HENT:      Head: Normocephalic and atraumatic.      Right Ear: External ear normal.      Left Ear: External ear normal.   Cardiovascular:      Rate and Rhythm: Normal rate and regular rhythm.      Heart sounds: Normal heart sounds. No murmur heard.  Pulmonary:      Effort: Pulmonary effort is normal.      Breath sounds: Normal breath sounds. No wheezing or rales.   Abdominal:      General: Bowel sounds are normal. There is no distension.      Palpations: Abdomen is soft.      Tenderness: There is no abdominal tenderness.        Laboratory:  CBC:  Recent Labs   Lab 11/09/21  0843 12/21/21  1006 10/31/22  0857   WBC 6.75 7.01 6.60   RBC 4.60 4.90 4.82   Hemoglobin 13.1 14.0 13.6   Hematocrit 40.2 44.8 42.1   Platelets 292 305 266   MCV 87 91 87   MCH 28.5 28.6 28.2   MCHC 32.6 31.3 L 32.3     CMP:  Recent Labs   Lab 10/26/20  0909 11/09/21  0843 02/16/22  1353 10/31/22  0856   Glucose 98 99   < > 112 H   Calcium 9.7 9.8   < > 9.6   Albumin 4.5 4.8  --  4.6   Total Protein 7.4 7.7  --  7.9   Sodium 144 143   < > 145   Potassium 4.0 3.9   < > 4.0   CO2 30 H 30 H   < > 32 H   Chloride 106 104   < > 102   BUN 16 21 H   < > 19 H   Alkaline Phosphatase 74 72  --  76   ALT 40 42  --  50 H   AST 26 26  --  30   Total " Bilirubin 0.5 0.4  --  0.3    < > = values in this interval not displayed.     URINALYSIS:       LIPIDS:  Recent Labs   Lab 10/26/20  0909 11/09/21  0843 10/31/22  0856   TSH 1.130 0.895 1.170   HDL 38 L 40 37 L   Cholesterol 190 198 195   Triglycerides 251 H 229 H 257 H   LDL Cholesterol 101.8 112.2 106.6   HDL/Cholesterol Ratio 20.0 20.2 19.0 L   Non-HDL Cholesterol 152 158 158   Total Cholesterol/HDL Ratio 5.0 5.0 5.3 H     TSH:  Recent Labs   Lab 10/26/20  0909 11/09/21  0843 10/31/22  0856   TSH 1.130 0.895 1.170     A1C:        Assessment/Plan     Natasha Espinal is a 56 y.o.female with:    1. Annual physical exam  Discussed diet and exercise, vaccines and cancer screening, risk factors.  Screening labs reviewed.  2. Essential hypertension  - semaglutide, weight loss, (WEGOVY) 0.5 mg/0.5 mL PnIj; Inject 0.5 mg into the skin every 7 days.  Dispense: 2 mL; Refill: 3  Continue current meds.    3. Mixed hyperlipidemia  Continue current meds.    4. History of breast cancer  - semaglutide, weight loss, (WEGOVY) 0.5 mg/0.5 mL PnIj; Inject 0.5 mg into the skin every 7 days.  Dispense: 2 mL; Refill: 3  F/u for surveillance  5. Anxiety  Continue current meds.    6. Primary insomnia  Continue current meds.    7. Obesity, unspecified classification, unspecified obesity type, unspecified whether serious comorbidity present  - semaglutide, weight loss, (WEGOVY) 0.5 mg/0.5 mL PnIj; Inject 0.5 mg into the skin every 7 days.  Dispense: 2 mL; Refill: 3    8. Metabolic syndrome  - semaglutide, weight loss, (WEGOVY) 0.5 mg/0.5 mL PnIj; Inject 0.5 mg into the skin every 7 days.  Dispense: 2 mL; Refill: 3    9. Impaired fasting glucose  - semaglutide, weight loss, (WEGOVY) 0.5 mg/0.5 mL PnIj; Inject 0.5 mg into the skin every 7 days.  Dispense: 2 mL; Refill: 3    10. Screen for colon cancer  - Fecal Immunochemical Test (iFOBT); Future     Chronic conditions status updated as per HPI.  Other than changes above, cont  current medications and maintain follow up with specialists.  Follow up in about 1 year (around 11/2/2023) for Annual preventative visit.    Future Appointments   Date Time Provider Department Center   3/14/2023  1:00 PM [facility] Espinoza MD  [facility]

## 2022-11-12 DIAGNOSIS — F51.01 PRIMARY INSOMNIA: ICD-10-CM

## 2022-11-12 DIAGNOSIS — I10 ESSENTIAL HYPERTENSION: ICD-10-CM

## 2022-11-12 RX ORDER — TRAZODONE HYDROCHLORIDE 50 MG/1
TABLET ORAL
Qty: 90 TABLET | Refills: 3 | Status: SHIPPED | OUTPATIENT
Start: 2022-11-12 | End: 2023-08-29

## 2022-11-12 RX ORDER — AMLODIPINE BESYLATE 10 MG/1
TABLET ORAL
Qty: 90 TABLET | Refills: 3 | Status: SHIPPED | OUTPATIENT
Start: 2022-11-12 | End: 2023-08-29

## 2022-11-12 NOTE — TELEPHONE ENCOUNTER
Care Due:                  Date            Visit Type   Department     Provider  --------------------------------------------------------------------------------                                EP -                              PRIMARY      [facility] PRIMARY  Last Visit: 11-      CARE ([facility])   CARE           Ta [facility]s  Next Visit: None Scheduled  None         None Found                                                            Last  Test          Frequency    Reason                     Performed    Due Date  --------------------------------------------------------------------------------    HBA1C.......  6 months...  semaglutide,.............  Not Found    Overdue    [employer] Embedded Care Gaps. Reference number: [id number]. 11/12/2022   12:16:15 AM CST

## 2022-11-12 NOTE — TELEPHONE ENCOUNTER
Refill Decision Note   Natasha Espinal  is requesting a refill authorization.  Brief Assessment and Rationale for Refill:  Approve    -Medication-Related Problems Identified: Requires labs  Medication Therapy Plan:       Medication Reconciliation Completed: No   Comments:     Provider Staff:     Action is required for this patient.   Please see care gap opportunities below in Care Due Message.     Thanks!  [facility]     Appointments      Date Provider   Last Visit   11/2/2022 Ta Espinoza MD   Next Visit   Visit date not found aT Espinoza MD     Note composed:1:41 PM 11/12/2022           Note composed:1:41 PM 11/12/2022

## 2022-11-22 ENCOUNTER — LAB VISIT (OUTPATIENT)
Dept: LAB | Facility: HOSPITAL | Age: 57
End: 2022-11-22
Attending: INTERNAL MEDICINE
Payer: COMMERCIAL

## 2022-11-22 DIAGNOSIS — Z12.11 SCREEN FOR COLON CANCER: ICD-10-CM

## 2022-11-22 PROCEDURE — 82274 ASSAY TEST FOR BLOOD FECAL: CPT | Performed by: INTERNAL MEDICINE

## 2022-11-29 LAB — HEMOCCULT STL QL IA: NEGATIVE

## 2023-03-15 ENCOUNTER — OFFICE VISIT (OUTPATIENT)
Dept: OTOLARYNGOLOGY | Facility: CLINIC | Age: 58
End: 2023-03-15
Payer: COMMERCIAL

## 2023-03-15 VITALS
WEIGHT: 168.88 LBS | HEART RATE: 91 BPM | DIASTOLIC BLOOD PRESSURE: 81 MMHG | SYSTOLIC BLOOD PRESSURE: 118 MMHG | HEIGHT: 59 IN | BODY MASS INDEX: 34.04 KG/M2

## 2023-03-15 DIAGNOSIS — J39.8 TRACHEAL STENOSIS: Primary | ICD-10-CM

## 2023-03-15 PROCEDURE — 3079F DIAST BP 80-89 MM HG: CPT | Mod: CPTII,S$GLB,, | Performed by: NURSE PRACTITIONER

## 2023-03-15 PROCEDURE — 31575 PR LARYNGOSCOPY, FLEXIBLE; DIAGNOSTIC: ICD-10-PCS | Mod: S$GLB,,, | Performed by: NURSE PRACTITIONER

## 2023-03-15 PROCEDURE — 3074F PR MOST RECENT SYSTOLIC BLOOD PRESSURE < 130 MM HG: ICD-10-PCS | Mod: CPTII,S$GLB,, | Performed by: NURSE PRACTITIONER

## 2023-03-15 PROCEDURE — 1159F PR MEDICATION LIST DOCUMENTED IN MEDICAL RECORD: ICD-10-PCS | Mod: CPTII,S$GLB,, | Performed by: NURSE PRACTITIONER

## 2023-03-15 PROCEDURE — 99999 PR PBB SHADOW E&M-EST. PATIENT-LVL III: ICD-10-PCS | Mod: PBBFAC,,, | Performed by: NURSE PRACTITIONER

## 2023-03-15 PROCEDURE — 3008F PR BODY MASS INDEX (BMI) DOCUMENTED: ICD-10-PCS | Mod: CPTII,S$GLB,, | Performed by: NURSE PRACTITIONER

## 2023-03-15 PROCEDURE — 99214 OFFICE O/P EST MOD 30 MIN: CPT | Mod: 25,S$GLB,, | Performed by: NURSE PRACTITIONER

## 2023-03-15 PROCEDURE — 99999 PR PBB SHADOW E&M-EST. PATIENT-LVL III: CPT | Mod: PBBFAC,,, | Performed by: NURSE PRACTITIONER

## 2023-03-15 PROCEDURE — 3008F BODY MASS INDEX DOCD: CPT | Mod: CPTII,S$GLB,, | Performed by: NURSE PRACTITIONER

## 2023-03-15 PROCEDURE — 99214 PR OFFICE/OUTPT VISIT, EST, LEVL IV, 30-39 MIN: ICD-10-PCS | Mod: 25,S$GLB,, | Performed by: NURSE PRACTITIONER

## 2023-03-15 PROCEDURE — 31575 DIAGNOSTIC LARYNGOSCOPY: CPT | Mod: S$GLB,,, | Performed by: NURSE PRACTITIONER

## 2023-03-15 PROCEDURE — 1159F MED LIST DOCD IN RCRD: CPT | Mod: CPTII,S$GLB,, | Performed by: NURSE PRACTITIONER

## 2023-03-15 PROCEDURE — 3079F PR MOST RECENT DIASTOLIC BLOOD PRESSURE 80-89 MM HG: ICD-10-PCS | Mod: CPTII,S$GLB,, | Performed by: NURSE PRACTITIONER

## 2023-03-15 PROCEDURE — 3074F SYST BP LT 130 MM HG: CPT | Mod: CPTII,S$GLB,, | Performed by: NURSE PRACTITIONER

## 2023-03-15 NOTE — PROGRESS NOTES
"[facility]  Department of Otorhinolaryngology and Communication Sciences    Natasha Espinal is a 57 y.o. female who returns for a follow up visit. Her breathing has significantly improved since surgery. She does not have any wheezing or stridor.   She is doing well overall.      She initially presented to the [facility] for consultation at the kind request of Dr. Porter Castaneda for further management of tracheal stenosis.    She complained of mild dyspnea and wheezing.  She has tracheal stenosis, ultimately identified in May 2018 when she was found to be a very difficult intubation for breast reconstruction surgery. She was connected to Dr. Ridley, who brought her for endoscopic airway intervention in Sept 2018. She had done well since then but was lost to follow up due to high out of pocket costs with her insurance plan.     2 years ago she noticed exertional dyspnea an noisy breathing when [place] was [place]. Since then she has been aware of mild exertional dyspnea, phonatory dyspnea, and mild inspiratory airway noise. Symptoms are stable and not deteriorating. Symptoms are nonresponsive to bronchodilator.    She had PFTs with pulm recently  FVL 11/29/21  Truncation and flattening of expiratory flow volume loop; some truncation of inspiratory loop  "No convincing evidence of reactive airway disease. No bronchodilator response. PFT not consistent with asthma."    Has a prior longstanding history of bad sinus problems, needed sinus surgery  No personal or family history of rheumatologic disorders.    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      Dyspnea Index Score 1/25/2022   Dyspnea Index Total Score  20     No flowsheet data found.    Past Medical History  She has a past medical history of Allergy, Asthma, Breast cancer, and Hypertension.    Past Surgical History  She has a past surgical history that includes Hysterectomy (2011); Oophorectomy; deviated septum repair; Mastectomy " (Bilateral, 02/2018); Breast biopsy (Left, 12/2017); Breast biopsy (Left, 1983); Breast reconstruction (Bilateral); scar tissue removal in airway  (Bilateral, 08/01/2018); Knee surgery (Left, 01/06/2020); Sinus surgery; Direct laryngobronchoscopy (N/A, 2/17/2022); Dilation of trachea (2/17/2022); and Injection of steroid (2/17/2022).    Family History  Her family history includes Arthritis in her father and mother; Asthma in her brother and mother; Breast cancer (age of onset: 43) in her paternal aunt; Breast cancer (age of onset: 55) in her paternal aunt; Breast cancer (age of onset: 62) in her paternal aunt; COPD in her father; Colon cancer in her paternal uncle; Hearing loss in her father; Heart disease in her father.    Social History  She reports that she has never smoked. She has never used smokeless tobacco. She reports that she does not drink alcohol and does not use drugs.    Allergies  She is allergic to naproxen and sulfa (sulfonamide antibiotics).    Medications  She has a current medication list which includes the following prescription(s): amlodipine, anastrozole, ascorbate calcium (vitamin c), aspirin, cetirizine, cholecalciferol (vitamin d3), fish oil-omega-3 fatty acids, fluticasone propionate, hydrochlorothiazide, hydroxyzine hcl, melatonin, multivit-iron-min-folic acid, multivitamin/iron/folic acid, olmesartan-hydrochlorothiazide, intrarosa, trazodone, and famotidine.    Review of Systems   Constitutional: Negative.  Negative for fever.   HENT: Negative.  Negative for mouth sores, postnasal drip, sinus pressure, sore throat, trouble swallowing and voice change.    Eyes: Negative.  Negative for visual disturbance.   Respiratory: Negative.  Negative for cough, shortness of breath and wheezing.    Cardiovascular: Negative.  Negative for chest pain.   Gastrointestinal: Negative.  Negative for nausea.   Endocrine: Negative.    Genitourinary: Negative.    Musculoskeletal: Negative.  Negative for  "arthralgias.   Skin: Negative.  Negative for rash.   Allergic/Immunologic: Negative.    Neurological: Negative.  Negative for tremors.   Hematological: Negative.  Does not bruise/bleed easily.   Psychiatric/Behavioral: Negative.  The patient is not nervous/anxious.         Objective:     /81 (BP Location: Right arm, Patient Position: Sitting, BP Method: Medium (Automatic))   Pulse 91   Ht 4' 11" (1.499 m)   Wt 76.6 kg (168 lb 14 oz)   BMI 34.11 kg/m²      Physical Exam  Constitutional: comfortable, well dressed  Psychiatric: appropriate affect  Respiratory: comfortably breathing, symmetric chest rise, no stridor  Voice: faint inconsistent inspiratory stridor  Cardiovascular: upper extremities non-edematous  Lymphatic: no cervical lymphadenopathy  Neurologic: alert and oriented to time, place, person, and situation; cranial nerves 3-12 grossly intact  Head: normocephalic  Eyes: conjunctivae and sclerae clear  Ears: normal pinnae, normal external auditory canals, tympanic membranes intact  Nose: mucosa pink and noncongested, no masses, no mucopurulence, no polyps  Oral cavity / oropharynx: no mucosal lesions  Neck: soft, full range of motion, laryngotracheal complex palpable with appropriate landmarks, larynx elevates on swallowing  Indirect laryngoscopy: limited due to gag    Procedure  Flexible Laryngoscopy (29409): Laryngoscopy is indicated for assessment of upper aerodigestive structure and function. This was carried out transnasally with a distal chip videoendoscope. After verbal consent was obtained, the patient was positioned and the nose was topically decongested with 1% phenylephrine and topically anesthetized with 4% lidocaine. The endoscope was passed through the most patent nasal cavity and positioned to image the nasopharynx, larynx, and hypopharynx in detail. The following features were examined: nasopharyngeal, laryngeal, hypopharyngeal masses; velopharyngeal strength, closure, and symmetry of " motion; vocal fold range and symmetry of motion; laryngeal mucosal edema, erythema, inflammation, and hydration; salivary pooling; and gross laryngeal sensation. The equipment was removed. The patient tolerated the procedure well without complication. All findings were normal except:  - significant improvement in airway, stable compared to previous exam    Assessment:     Natasha Espinal is a 57 y.o. female with chronic tracheal stenosis, suspected to be idiopathic. Now 1 year s/p suspension microlaryngoscopy, CO2 laser excision, steroid injection, balloon dilation.       Plan:        Problem List Items Addressed This Visit          Pulmonary    Tracheal stenosis - Primary     Doing well. No evidence of stenosis. RTC in 1 year, sooner with any changes.

## 2023-04-19 ENCOUNTER — OFFICE VISIT (OUTPATIENT)
Dept: HEMATOLOGY/ONCOLOGY | Facility: CLINIC | Age: 58
End: 2023-04-19
Payer: COMMERCIAL

## 2023-04-19 VITALS
RESPIRATION RATE: 18 BRPM | HEART RATE: 82 BPM | BODY MASS INDEX: 34.11 KG/M2 | OXYGEN SATURATION: 96 % | TEMPERATURE: 98 F | HEIGHT: 59 IN | DIASTOLIC BLOOD PRESSURE: 79 MMHG | SYSTOLIC BLOOD PRESSURE: 132 MMHG

## 2023-04-19 DIAGNOSIS — Z17.0 CARCINOMA OF AXILLARY TAIL OF LEFT BREAST IN FEMALE, ESTROGEN RECEPTOR POSITIVE: Primary | ICD-10-CM

## 2023-04-19 DIAGNOSIS — C50.612 CARCINOMA OF AXILLARY TAIL OF LEFT BREAST IN FEMALE, ESTROGEN RECEPTOR POSITIVE: Primary | ICD-10-CM

## 2023-04-19 DIAGNOSIS — Z79.811 USE OF AROMATASE INHIBITORS: ICD-10-CM

## 2023-04-19 PROCEDURE — 3075F SYST BP GE 130 - 139MM HG: CPT | Mod: CPTII,S$GLB,, | Performed by: INTERNAL MEDICINE

## 2023-04-19 PROCEDURE — 99214 OFFICE O/P EST MOD 30 MIN: CPT | Mod: S$GLB,,, | Performed by: INTERNAL MEDICINE

## 2023-04-19 PROCEDURE — 3075F PR MOST RECENT SYSTOLIC BLOOD PRESS GE 130-139MM HG: ICD-10-PCS | Mod: CPTII,S$GLB,, | Performed by: INTERNAL MEDICINE

## 2023-04-19 PROCEDURE — 3008F PR BODY MASS INDEX (BMI) DOCUMENTED: ICD-10-PCS | Mod: CPTII,S$GLB,, | Performed by: INTERNAL MEDICINE

## 2023-04-19 PROCEDURE — 99999 PR PBB SHADOW E&M-EST. PATIENT-LVL V: ICD-10-PCS | Mod: PBBFAC,,, | Performed by: INTERNAL MEDICINE

## 2023-04-19 PROCEDURE — 99214 PR OFFICE/OUTPT VISIT, EST, LEVL IV, 30-39 MIN: ICD-10-PCS | Mod: S$GLB,,, | Performed by: INTERNAL MEDICINE

## 2023-04-19 PROCEDURE — 99999 PR PBB SHADOW E&M-EST. PATIENT-LVL V: CPT | Mod: PBBFAC,,, | Performed by: INTERNAL MEDICINE

## 2023-04-19 PROCEDURE — 1159F PR MEDICATION LIST DOCUMENTED IN MEDICAL RECORD: ICD-10-PCS | Mod: CPTII,S$GLB,, | Performed by: INTERNAL MEDICINE

## 2023-04-19 PROCEDURE — 3008F BODY MASS INDEX DOCD: CPT | Mod: CPTII,S$GLB,, | Performed by: INTERNAL MEDICINE

## 2023-04-19 PROCEDURE — 3078F DIAST BP <80 MM HG: CPT | Mod: CPTII,S$GLB,, | Performed by: INTERNAL MEDICINE

## 2023-04-19 PROCEDURE — 1159F MED LIST DOCD IN RCRD: CPT | Mod: CPTII,S$GLB,, | Performed by: INTERNAL MEDICINE

## 2023-04-19 PROCEDURE — 3078F PR MOST RECENT DIASTOLIC BLOOD PRESSURE < 80 MM HG: ICD-10-PCS | Mod: CPTII,S$GLB,, | Performed by: INTERNAL MEDICINE

## 2023-04-19 NOTE — PROGRESS NOTES
Subjective:       Patient ID: Natasha Espinal is a 57 y.o. female.    Chief Complaint: No chief complaint on file.      HPI     Mrs. Espinal returns today for follow-up.  I would last seen her in October 2022.  Of note, she has been on anastrazole since March 2018, and so far has tolerated it OK.      Briefly, she is a 57-year-old  female who was referred for evaluation after undergoing bilateral nipple-sparing mastectomies.   On the left nipple-sparing mastectomy specimen, there was a 3.4 cm tumor.  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  There was no evidence of DCIS or malignancy on the right mastectomy specimen.  A MammaPrint test was sent and suggested that she had a 97.8% chance of disease free survival at five years with hormonal therapy.    Her DXA scan on on November 5, 2021 had shown mild osteopenia of the spine and low normal density of the hip.  She has been evaluated by the Pulmonary service for asthma, however, her PFTs were suggestive of extrathoracic tracheal stenosis.  She  underwent a tracheal dilatation with immediate improvement of her symptoms.    Review of Systems      Overall she feels OK.  ECOG PS is 1.  She denies any depression, easy bruising, fevers, chills, night  sweats, weight loss, nausea, vomiting, diarrhea, constipation, diplopia, blurred vision, headache, chest pain, palpitations, shortness of breath, breast pain, abdominal pain, extremity pain, or difficulty ambulating.  The remainder of the ten-point ROS, including general, skin, lymph, H/N, cardiorespiratory, GI, , Neuro, Endocrine, and psychiatric is negative.   When asked specifically about symptoms of tracheal stenosis she states that since she had dilatation a year ago she has had no symptoms      Objective:      Physical Exam        She is alert, oriented to time, place, person, pleasant, well nourished, in no acute physical distress.                                VITAL SIGNS:   Reviewed.                                   HEENT:  Normal.  There are no nasal, oral, lip, gingival, auricular, lid,    or conjunctival lesions.  Mucosae are moist and pink, and there is no        thrush.  Pupils are equal, reactive to light and accommodation.              Extraocular muscle movements are intact.  Dentition is good.  There is no frontal or maxillary tenderness.                                     NECK:  Supple without JVD, adenopathy, or thyromegaly.                       LUNGS:  Clear to auscultation without wheezing, rales, or rhonchi.           CARDIOVASCULAR:  Reveals an S1, S2, no murmurs, no rubs, no gallops.         ABDOMEN:  Soft, nontender, without organomegaly.  Bowel sounds are    present.   Her SHRUTHI flap incision has healed nicely.                                                                 EXTREMITIES:  No cyanosis, clubbing, or edema.                             BREASTS:  She is status post bilateral nipples paring mastectomies.     Her incisions are well healed and no masses are palpated.                               LYMPHATIC:  There is no cervical, axillary, or supraclavicular adenopathy.   SKIN:  Warm and moist, without petechiae, rashes, induration, or ecchymoses.           NEUROLOGIC:  DTRs are 0-1+ bilaterally, symmetrical, motor function is 5/5,  and cranial nerves are  within normal limits.    Assessment:       1. Carcinoma of axillary tail of left breast in female, estrogen receptor positive    2. Use of aromatase inhibitors         3.  Extrathoracic Tracheal stenosis, resolved       4. Musculoskeletal complaints secondary to anastrozole  Plan:        I had a long discussion with her.    We discussed the option of the breast cancer index and also the option of extending her treatment to 7 years.  Since she may have to pay for the BCI test she elected to just stay on anastrozole for 7 years, which is not unreasonable.  I will see her again in 6 months with a repeat DXA  scan, and will ask our navigator to see how much her out-of-pocket would be for the breast cancer index test.    Her multiple questions were answered to her satisfaction.  Route Chart for Scheduling    Med Onc Chart Routing      Follow up with physician 6 months.   Follow up with JAYASHREE    Infusion scheduling note    Injection scheduling note    Labs    Imaging DXA scan      Pharmacy appointment    Other referrals

## 2023-05-02 ENCOUNTER — PATIENT MESSAGE (OUTPATIENT)
Dept: ADMINISTRATIVE | Facility: OTHER | Age: 58
End: 2023-05-02
Payer: COMMERCIAL

## 2023-05-18 ENCOUNTER — PATIENT MESSAGE (OUTPATIENT)
Dept: ADMINISTRATIVE | Facility: OTHER | Age: 58
End: 2023-05-18
Payer: COMMERCIAL

## 2023-05-31 ENCOUNTER — PATIENT MESSAGE (OUTPATIENT)
Dept: PRIMARY CARE CLINIC | Facility: CLINIC | Age: 58
End: 2023-05-31
Payer: COMMERCIAL

## 2023-05-31 DIAGNOSIS — Z87.898 HISTORY OF MOTION SICKNESS: Primary | ICD-10-CM

## 2023-06-02 RX ORDER — SCOLOPAMINE TRANSDERMAL SYSTEM 1 MG/1
1 PATCH, EXTENDED RELEASE TRANSDERMAL
Qty: 4 PATCH | Refills: 0 | Status: SHIPPED | OUTPATIENT
Start: 2023-06-02 | End: 2023-11-13

## 2023-07-08 ENCOUNTER — OFFICE VISIT (OUTPATIENT)
Dept: URGENT CARE | Facility: CLINIC | Age: 58
End: 2023-07-08
Payer: COMMERCIAL

## 2023-07-08 VITALS
RESPIRATION RATE: 20 BRPM | WEIGHT: 161 LBS | SYSTOLIC BLOOD PRESSURE: 102 MMHG | BODY MASS INDEX: 32.46 KG/M2 | OXYGEN SATURATION: 95 % | DIASTOLIC BLOOD PRESSURE: 68 MMHG | HEIGHT: 59 IN | TEMPERATURE: 99 F | HEART RATE: 107 BPM

## 2023-07-08 DIAGNOSIS — R19.7 DIARRHEA, UNSPECIFIED TYPE: Primary | ICD-10-CM

## 2023-07-08 LAB
CTP QC/QA: YES
SARS-COV-2 AG RESP QL IA.RAPID: NEGATIVE

## 2023-07-08 PROCEDURE — 87811 SARS CORONAVIRUS 2 ANTIGEN POCT, MANUAL READ: ICD-10-PCS | Mod: QW,S$GLB,, | Performed by: FAMILY MEDICINE

## 2023-07-08 PROCEDURE — 87811 SARS-COV-2 COVID19 W/OPTIC: CPT | Mod: QW,S$GLB,, | Performed by: FAMILY MEDICINE

## 2023-07-08 PROCEDURE — 99213 PR OFFICE/OUTPT VISIT, EST, LEVL III, 20-29 MIN: ICD-10-PCS | Mod: S$GLB,,, | Performed by: FAMILY MEDICINE

## 2023-07-08 PROCEDURE — 99213 OFFICE O/P EST LOW 20 MIN: CPT | Mod: S$GLB,,, | Performed by: FAMILY MEDICINE

## 2023-07-08 RX ORDER — DIPHENOXYLATE HYDROCHLORIDE AND ATROPINE SULFATE 2.5; .025 MG/1; MG/1
1 TABLET ORAL 4 TIMES DAILY PRN
Qty: 15 TABLET | Refills: 0 | Status: SHIPPED | OUTPATIENT
Start: 2023-07-08 | End: 2023-07-18

## 2023-07-08 NOTE — PROGRESS NOTES
"Subjective:      Patient ID: Natasha Espinal is a 57 y.o. female.    Vitals:  height is 4' 11" (1.499 m) and weight is 73 kg (161 lb). Her oral temperature is 98.8 °F (37.1 °C). Her blood pressure is 102/68 and her pulse is 107. Her respiration is 20 and oxygen saturation is 95%.     Chief Complaint: Abdominal Pain (Diarrhea - Entered by patient)    Pt  has same symptoms but has improved , upto 10 loose stools a day, no associated n/v or fever, denies any abx intake    Denies any blood or mucous in stool, no travel hx      Abdominal Pain  This is a new problem. The current episode started in the past 7 days (8 days). The onset quality is sudden. The problem occurs constantly. The problem has been unchanged. The pain is located in the generalized abdominal region. The pain is mild. The quality of the pain is cramping and colicky. The abdominal pain does not radiate. Associated symptoms include diarrhea, headaches and nausea. Pertinent negatives include no belching, constipation, dysuria, fever, flatus, frequency or vomiting. The pain is aggravated by eating. The pain is relieved by Nothing. Treatments tried: pepto, The treatment provided no relief. There is no history of abdominal surgery, Crohn's disease, gallstones, GERD, irritable bowel syndrome or pancreatitis. Patient's medical history does not include kidney stones and UTI.     Constitution: Negative for fever.   Gastrointestinal:  Positive for abdominal pain, nausea and diarrhea. Negative for history of abdominal surgery, vomiting and constipation.   Genitourinary:  Negative for dysuria and frequency.   Neurological:  Positive for headaches.    Objective:     Physical Exam   Constitutional: She is oriented to person, place, and time. She appears well-developed. She is cooperative.   HENT:   Head: Normocephalic and atraumatic.   Ears:   Right Ear: Hearing, tympanic membrane, external ear and ear canal normal.   Left Ear: Hearing, tympanic " membrane, external ear and ear canal normal.   Nose: Nose normal. No mucosal edema or nasal deformity. No epistaxis. Right sinus exhibits no maxillary sinus tenderness and no frontal sinus tenderness. Left sinus exhibits no maxillary sinus tenderness and no frontal sinus tenderness.   Mouth/Throat: Uvula is midline, oropharynx is clear and moist and mucous membranes are normal. Mucous membranes are moist. No trismus in the jaw. Normal dentition. No uvula swelling. Oropharynx is clear.   Eyes: Conjunctivae and lids are normal. Pupils are equal, round, and reactive to light. Extraocular movement intact   Neck: Trachea normal and phonation normal. Neck supple.   Cardiovascular: Normal rate, regular rhythm, normal heart sounds and normal pulses.   Pulmonary/Chest: Effort normal and breath sounds normal.   Abdominal: Normal appearance and bowel sounds are normal. She exhibits no distension and no mass. Soft. There is no abdominal tenderness. There is no rebound, no guarding, no left CVA tenderness and no right CVA tenderness. No hernia.   Musculoskeletal: Normal range of motion.         General: Normal range of motion.   Neurological: She is alert and oriented to person, place, and time. She exhibits normal muscle tone.   Skin: Skin is warm, dry and intact.   Psychiatric: Her speech is normal and behavior is normal. Judgment and thought content normal.   Nursing note and vitals reviewed.    Assessment:     1. Diarrhea, unspecified type        Plan:       Diarrhea, unspecified type  -     SARS Coronavirus 2 Antigen, POCT Manual Read    Other orders  -     diphenoxylate-atropine 2.5-0.025 mg (LOMOTIL) 2.5-0.025 mg per tablet; Take 1 tablet by mouth 4 (four) times daily as needed for Diarrhea.  Dispense: 15 tablet; Refill: 0         Blend diet    If sx persists RTC or Follow up with PCP        Advised plenty of fluid intake       Results for orders placed or performed in visit on 07/08/23   SARS Coronavirus 2 Antigen, POCT  Manual Read   Result Value Ref Range    SARS Coronavirus 2 Antigen Negative Negative     Acceptable Yes

## 2023-07-11 ENCOUNTER — TELEPHONE (OUTPATIENT)
Dept: URGENT CARE | Facility: CLINIC | Age: 58
End: 2023-07-11
Payer: COMMERCIAL

## 2023-07-15 DIAGNOSIS — I10 ESSENTIAL HYPERTENSION: ICD-10-CM

## 2023-07-15 RX ORDER — OLMESARTAN MEDOXOMIL AND HYDROCHLOROTHIAZIDE 40/12.5 40; 12.5 MG/1; MG/1
TABLET ORAL
Qty: 90 TABLET | Refills: 1 | Status: SHIPPED | OUTPATIENT
Start: 2023-07-15 | End: 2024-02-07

## 2023-07-15 NOTE — TELEPHONE ENCOUNTER
No care due was identified.  [facility] Embedded Care Due Messages. Reference number: [id number].   7/15/2023 9:10:45 AM CDT

## 2023-07-16 NOTE — TELEPHONE ENCOUNTER
Refill Decision Note   Natasha Espinal  is requesting a refill authorization.  Brief Assessment and Rationale for Refill:  Approve     Medication Therapy Plan:       Medication Reconciliation Completed: No   Comments:     No Care Gaps recommended.     Note composed:11:04 PM 07/15/2023

## 2023-07-28 ENCOUNTER — OFFICE VISIT (OUTPATIENT)
Dept: PRIMARY CARE CLINIC | Facility: CLINIC | Age: 58
End: 2023-07-28
Payer: COMMERCIAL

## 2023-07-28 DIAGNOSIS — R19.7 DIARRHEA, UNSPECIFIED TYPE: Primary | ICD-10-CM

## 2023-07-28 PROCEDURE — 99213 OFFICE O/P EST LOW 20 MIN: CPT | Mod: 95,,, | Performed by: STUDENT IN AN ORGANIZED HEALTH CARE EDUCATION/TRAINING PROGRAM

## 2023-07-28 PROCEDURE — 99213 PR OFFICE/OUTPT VISIT, EST, LEVL III, 20-29 MIN: ICD-10-PCS | Mod: 95,,, | Performed by: STUDENT IN AN ORGANIZED HEALTH CARE EDUCATION/TRAINING PROGRAM

## 2023-07-28 NOTE — PROGRESS NOTES
Subjective:       Patient ID: Natasha Espinal is a 57 y.o. female.    Chief Complaint: No chief complaint on file.      The patient location is: [place], [place]  The chief complaint leading to consultation is: Diarrhea    Visit type: audiovisual    Face to Face time with patient: 10 minutes  15 minutes of total time spent on the encounter, which includes face to face time and non-face to face time preparing to see the patient (eg, review of tests), Obtaining and/or reviewing separately obtained history, Documenting clinical information in the electronic or other health record, Independently interpreting results (not separately reported) and communicating results to the patient/family/caregiver, or Care coordination (not separately reported).         Each patient to whom he or she provides medical services by telemedicine is:  (1) informed of the relationship between the physician and patient and the respective role of any other health care provider with respect to management of the patient; and (2) notified that he or she may decline to receive medical services by telemedicine and may withdraw from such care at any time.    Notes:     HPI    Sxs started June 30th July 8th went to urgent care-negative COVID  Cotninues to have diarrhea multiple times a day  + Watery diarrhea  No fevers or blood in stool    Review of Systems   Constitutional:  Positive for unexpected weight change. Negative for activity change.   HENT:  Negative for hearing loss, rhinorrhea and trouble swallowing.    Eyes:  Negative for discharge and visual disturbance.   Respiratory:  Negative for chest tightness and wheezing.    Cardiovascular:  Negative for chest pain and palpitations.   Gastrointestinal:  Positive for diarrhea. Negative for blood in stool, constipation and vomiting.   Endocrine: Negative for polydipsia and polyuria.   Genitourinary:  Negative for difficulty urinating, dysuria, hematuria and menstrual problem.    Musculoskeletal:  Negative for arthralgias, joint swelling and neck pain.   Neurological:  Negative for weakness and headaches.   Psychiatric/Behavioral:  Negative for confusion and dysphoric mood.          Objective:      Physical Exam  HENT:      Head: Normocephalic and atraumatic.      Nose: Nose normal.      Mouth/Throat:      Mouth: Mucous membranes are moist.      Pharynx: Oropharynx is clear.   Eyes:      Extraocular Movements: Extraocular movements intact.      Conjunctiva/sclera: Conjunctivae normal.      Pupils: Pupils are equal, round, and reactive to light.   Pulmonary:      Effort: Pulmonary effort is normal.   Musculoskeletal:         General: No swelling. Normal range of motion.      Cervical back: Normal range of motion.      Right lower leg: No edema.      Left lower leg: No edema.   Skin:     Findings: No lesion or rash.   Neurological:      General: No focal deficit present.      Mental Status: She is alert and oriented to person, place, and time.      Motor: No weakness.   Psychiatric:         Mood and Affect: Mood normal.         Thought Content: Thought content normal.           Assessment:       Problem List Items Addressed This Visit    None  Visit Diagnoses       Diarrhea, unspecified type    -  Primary    Relevant Orders    H. pylori antigen, stool (Completed)    Pancreatic elastase, fecal (Completed)    Fecal fat, qualitative (Completed)    Occult blood x 1, stool (Completed)    WBC, Stool (Completed)    Rotavirus antigen, stool (Completed)    Adenovirus Molecular Detection, PCR, Non-Blood Stool (Completed)    Calprotectin, Stool (Completed)    Giardia / Cryptosporidum, EIA (Completed)    Stool Exam-Ova,Cysts,Parasites (Completed)    Stool culture (Completed)            Plan:       Diagnoses and all orders for this visit:    Diarrhea, unspecified type  -     H. pylori antigen, stool; Future  -     Pancreatic elastase, fecal; Future  -     Fecal fat, qualitative; Future  -     Occult  blood x 1, stool; Future  -     WBC, Stool; Future  -     Rotavirus antigen, stool; Future  -     Adenovirus Molecular Detection, PCR, Non-Blood Stool; Future  -     Calprotectin, Stool; Future  -     Giardia / Cryptosporidum, EIA; Future  -     Stool Exam-Ova,Cysts,Parasites; Future  -     Cancel: Clostridium difficile EIA; Future  -     Stool culture; Future       Explained etiology of symptoms. Advised prn zofran for nausea. Hydrate with water or electrolyte replacement drink. Tylenol as needed for fevers. ED precautions given, patient verbalizes understanding. Advised following a BRAT diet as appetite improves.

## 2023-08-03 ENCOUNTER — PATIENT MESSAGE (OUTPATIENT)
Dept: PRIMARY CARE CLINIC | Facility: CLINIC | Age: 58
End: 2023-08-03
Payer: COMMERCIAL

## 2023-08-29 DIAGNOSIS — I10 ESSENTIAL HYPERTENSION: ICD-10-CM

## 2023-08-29 DIAGNOSIS — F51.01 PRIMARY INSOMNIA: ICD-10-CM

## 2023-08-29 RX ORDER — TRAZODONE HYDROCHLORIDE 50 MG/1
TABLET ORAL
Qty: 90 TABLET | Refills: 0 | Status: SHIPPED | OUTPATIENT
Start: 2023-08-29 | End: 2024-02-02

## 2023-08-29 RX ORDER — HYDROCHLOROTHIAZIDE 12.5 MG/1
TABLET ORAL
Qty: 90 TABLET | Refills: 0 | Status: SHIPPED | OUTPATIENT
Start: 2023-08-29 | End: 2023-10-30

## 2023-08-29 RX ORDER — AMLODIPINE BESYLATE 10 MG/1
TABLET ORAL
Qty: 90 TABLET | Refills: 0 | Status: SHIPPED | OUTPATIENT
Start: 2023-08-29 | End: 2024-02-02

## 2023-08-29 NOTE — TELEPHONE ENCOUNTER
Care Due:                  Date            Visit Type   Department     Provider  --------------------------------------------------------------------------------                                EP -                              PRIMARY      [facility] PRIMARY  Last Visit: 11-      CARE ([facility])   CARE           Ta Walls  Next Visit: None Scheduled  None         None Found                                                            Last  Test          Frequency    Reason                     Performed    Due Date  --------------------------------------------------------------------------------    Office Visit  12 months..  famotidine,                11-   10-                             hydroCHLOROthiazide,                             olmesartan-hydrochlorothi                             azide, traZODone.........    CMP.........  12 months..  famotidine,                10-   10-                             hydroCHLOROthiazide,                             olmesartan-hydrochlorothi                             azide....................    [employer] Embedded Care Due Messages. Reference number: [id number].   8/29/2023 4:27:04 PM CDT

## 2023-08-30 NOTE — TELEPHONE ENCOUNTER
Provider Staff:  Action required for this patient     Please see care gap opportunities below in Care Due Message.    Thanks!  [facility]     Appointments      Date Provider   Last Visit   11/2/2022 Ta Espinoza MD   Next Visit   Visit date not found Ta Espinoza MD     Refill Decision Note   Natasha Espinal  is requesting a refill authorization.  Brief Assessment and Rationale for Refill:  Approve     Medication Therapy Plan:         Comments:     Note composed:8:12 PM 08/29/2023

## 2023-09-15 ENCOUNTER — PATIENT MESSAGE (OUTPATIENT)
Dept: OTHER | Facility: OTHER | Age: 58
End: 2023-09-15
Payer: COMMERCIAL

## 2023-10-23 DIAGNOSIS — Z79.811 USE OF AROMATASE INHIBITORS: ICD-10-CM

## 2023-10-23 RX ORDER — ANASTROZOLE 1 MG/1
TABLET ORAL
Qty: 90 TABLET | Refills: 2 | Status: SHIPPED | OUTPATIENT
Start: 2023-10-23

## 2023-10-30 RX ORDER — HYDROCHLOROTHIAZIDE 12.5 MG/1
TABLET ORAL
Qty: 90 TABLET | Refills: 0 | Status: SHIPPED | OUTPATIENT
Start: 2023-10-30 | End: 2024-02-26

## 2023-10-30 NOTE — TELEPHONE ENCOUNTER
Care Due:                  Date            Visit Type   Department     Provider  --------------------------------------------------------------------------------                                EP -                              PRIMARY      [facility] PRIMARY  Last Visit: 11-      CARE ([facility])   CARE           Ta Walls                              EP -                              PRIMARY      [facility] PRIMARY  Next Visit: 11-      CARE ([facility])   CARE           Ta Walls                                                            Last  Test          Frequency    Reason                     Performed    Due Date  --------------------------------------------------------------------------------    CMP.........  12 months..  famotidine,                10-   10-                             hydroCHLOROthiazide,                             olmesartan-hydrochlorothi                             azide....................    [employer] Embedded Care Due Messages. Reference number: [id number].   10/30/2023 9:49:29 AM CDT

## 2023-10-30 NOTE — TELEPHONE ENCOUNTER
Refill Routing Note   Medication(s) are not appropriate for processing by [facility] for the following reason(s):      Required labs outdated    [facility] action(s):  Defer Care Due:  Labs due            Appointments  past 12m or future 3m with PCP    Date Provider   Last Visit   11/2/2022 Ta Espinoza MD   Next Visit   11/6/2023 Ta Espinoza MD   ED visits in past 90 days: 0        Note composed:4:31 PM 10/30/2023

## 2023-11-06 ENCOUNTER — OFFICE VISIT (OUTPATIENT)
Dept: PRIMARY CARE CLINIC | Facility: CLINIC | Age: 58
End: 2023-11-06
Payer: COMMERCIAL

## 2023-11-06 VITALS
HEIGHT: 59 IN | BODY MASS INDEX: 31.95 KG/M2 | DIASTOLIC BLOOD PRESSURE: 74 MMHG | WEIGHT: 158.5 LBS | OXYGEN SATURATION: 94 % | HEART RATE: 85 BPM | SYSTOLIC BLOOD PRESSURE: 116 MMHG

## 2023-11-06 DIAGNOSIS — Z85.3 HISTORY OF BREAST CANCER: ICD-10-CM

## 2023-11-06 DIAGNOSIS — R73.01 IMPAIRED FASTING GLUCOSE: ICD-10-CM

## 2023-11-06 DIAGNOSIS — F51.01 PRIMARY INSOMNIA: ICD-10-CM

## 2023-11-06 DIAGNOSIS — F41.9 ANXIETY: ICD-10-CM

## 2023-11-06 DIAGNOSIS — Z00.00 ANNUAL PHYSICAL EXAM: ICD-10-CM

## 2023-11-06 DIAGNOSIS — Z12.12 ENCOUNTER FOR COLORECTAL CANCER SCREENING: ICD-10-CM

## 2023-11-06 DIAGNOSIS — E78.2 MIXED HYPERLIPIDEMIA: ICD-10-CM

## 2023-11-06 DIAGNOSIS — Z12.11 ENCOUNTER FOR COLORECTAL CANCER SCREENING: ICD-10-CM

## 2023-11-06 DIAGNOSIS — I10 ESSENTIAL HYPERTENSION: Primary | ICD-10-CM

## 2023-11-06 PROCEDURE — 1159F PR MEDICATION LIST DOCUMENTED IN MEDICAL RECORD: ICD-10-PCS | Mod: CPTII,S$GLB,, | Performed by: INTERNAL MEDICINE

## 2023-11-06 PROCEDURE — 1159F MED LIST DOCD IN RCRD: CPT | Mod: CPTII,S$GLB,, | Performed by: INTERNAL MEDICINE

## 2023-11-06 PROCEDURE — 99396 PR PREVENTIVE VISIT,EST,40-64: ICD-10-PCS | Mod: S$GLB,,, | Performed by: INTERNAL MEDICINE

## 2023-11-06 PROCEDURE — 3074F SYST BP LT 130 MM HG: CPT | Mod: CPTII,S$GLB,, | Performed by: INTERNAL MEDICINE

## 2023-11-06 PROCEDURE — 99999 PR PBB SHADOW E&M-EST. PATIENT-LVL IV: CPT | Mod: PBBFAC,,, | Performed by: INTERNAL MEDICINE

## 2023-11-06 PROCEDURE — 99396 PREV VISIT EST AGE 40-64: CPT | Mod: S$GLB,,, | Performed by: INTERNAL MEDICINE

## 2023-11-06 PROCEDURE — 3008F PR BODY MASS INDEX (BMI) DOCUMENTED: ICD-10-PCS | Mod: CPTII,S$GLB,, | Performed by: INTERNAL MEDICINE

## 2023-11-06 PROCEDURE — 3078F PR MOST RECENT DIASTOLIC BLOOD PRESSURE < 80 MM HG: ICD-10-PCS | Mod: CPTII,S$GLB,, | Performed by: INTERNAL MEDICINE

## 2023-11-06 PROCEDURE — 3008F BODY MASS INDEX DOCD: CPT | Mod: CPTII,S$GLB,, | Performed by: INTERNAL MEDICINE

## 2023-11-06 PROCEDURE — 3074F PR MOST RECENT SYSTOLIC BLOOD PRESSURE < 130 MM HG: ICD-10-PCS | Mod: CPTII,S$GLB,, | Performed by: INTERNAL MEDICINE

## 2023-11-06 PROCEDURE — 99999 PR PBB SHADOW E&M-EST. PATIENT-LVL IV: ICD-10-PCS | Mod: PBBFAC,,, | Performed by: INTERNAL MEDICINE

## 2023-11-06 PROCEDURE — 3078F DIAST BP <80 MM HG: CPT | Mod: CPTII,S$GLB,, | Performed by: INTERNAL MEDICINE

## 2023-11-06 NOTE — PROGRESS NOTES
[facility] Note    Chief Complaint      Chief Complaint   Patient presents with    Annual Exam       History of Present Illness      Natasha Espinal is a 57 y.o. female with chronic conditions of HTN, HLD, hx of breast cancer, asthma, anxiety, insomnia who presents today for: annual preventative visit.  HTN: BP at goal on amlodipine, olmesartan-hydrochlorothiazide    HLD: Controlled off atorvastatin.   last check.  Repeat in 6 months.  Hx of breast cancer: Sees oncology, Dr. Olivier.  On anastrazole, on 5.5/7 years.  Still with myalgias with anastrozole.  No new changes.  Asthma: Has albuterol as needed.  Has not needed rescue since last visit.  Anxiety: Controlled off effexor.  No new or worsening symptoms.    Insomnia: Partially controlled on trazodone.  Doing well on this regimen.     Flu shot UTD.  TdAP 2017.  Shingles vaccine discussed.  Pneumonia vaccine due age 64.  COVID vaccine UTD.  Mammogram N/A bilateral mastectomy.  DEXA 2019, Dr. Boswell.  FIT 11/2022.     Past Medical History:  Past Medical History:   Diagnosis Date    Allergy     Asthma     Breast cancer 12/2017    left     Hypertension        Past Surgical History:   has a past surgical history that includes Hysterectomy (2011); Oophorectomy; deviated septum repair; Mastectomy (Bilateral, 02/2018); Breast biopsy (Left, 12/2017); Breast biopsy (Left, 1983); Breast reconstruction (Bilateral); scar tissue removal in airway  (Bilateral, 08/01/2018); Knee surgery (Left, 01/06/2020); Sinus surgery; Direct laryngobronchoscopy (N/A, 02/17/2022); Dilation of trachea (02/17/2022); Injection of steroid (02/17/2022); and Cosmetic surgery (2018).    Family History:  family history includes Arthritis in her father and mother; Asthma in her brother and mother; Breast cancer (age of onset: 43) in her paternal aunt; Breast cancer (age of onset: 55) in her paternal aunt; Breast cancer (age of onset: 62) in her paternal aunt; COPD in  her father; Colon cancer in her paternal uncle; Hearing loss in her father and mother; Heart disease in her father.     Social History:  Social History     Tobacco Use    Smoking status: Never    Smokeless tobacco: Never   Substance Use Topics    Alcohol use: Not Currently    Drug use: Never       I personally reviewed all past medical, surgical, social and family history.    Review of Systems   Constitutional:  Negative for chills, fever and malaise/fatigue.   Respiratory:  Negative for shortness of breath.    Cardiovascular:  Negative for chest pain.   Gastrointestinal:  Negative for constipation, diarrhea, nausea and vomiting.   Skin:  Negative for rash.   Neurological:  Negative for weakness.   All other systems reviewed and are negative.       Medications:  Outpatient Encounter Medications as of 11/6/2023   Medication Sig Note Dispense Refill    hydroCHLOROthiazide (HYDRODIURIL) 12.5 MG Tab TAKE 1 TABLET BY MOUTH EVERY DAY  90 tablet 0    amLODIPine (NORVASC) 10 MG tablet TAKE 1 TABLET BY MOUTH EVERY DAY IN THE EVENING  90 tablet 0    anastrozole (ARIMIDEX) 1 mg Tab TAKE 1 TABLET BY MOUTH EVERY DAY  90 tablet 2    ascorbate calcium 500 mg Tab Take 1 tablet by mouth. 2/11/2022: Hold 1 week prior to surgery          aspirin (ECOTRIN) 81 MG EC tablet Take 81 mg by mouth once daily. 2/11/2022: HOLD UNTIL AFTER SURGERY      cetirizine (ZYRTEC) 10 MG tablet Take 10 mg by mouth once daily.       cholecalciferol, vitamin D3, (VITAMIN D3) 50 mcg (2,000 unit) Cap Take 1 capsule by mouth once daily. 2/11/2022: HOLD UNTIL AFTER SURGERY        famotidine (PEPCID) 20 MG tablet Take 1 tablet (20 mg total) by mouth 2 (two) times daily.  60 tablet 11    fish oil-omega-3 fatty acids 300-1,000 mg capsule Take 1 g by mouth. 2/11/2022: HOLD UNTIL AFTER SURGERY      fluticasone propionate (FLONASE) 50 mcg/actuation nasal spray 1 spray (50 mcg total) by Each Nostril route once daily.  11.1 mL 2    hydrOXYzine HCL (ATARAX) 25 MG  tablet Take 1 tablet (25 mg total) by mouth 3 (three) times daily as needed for Itching.  60 tablet 1    melatonin 5 mg Tab Take by mouth.       MULTIVIT-IRON-MIN-FOLIC ACID 3,500-18-0.4 UNIT-MG-MG ORAL CHEW Take by mouth once daily. 2/11/2022: HOLD UNTIL AFTER SURGERY      multivitamin/iron/folic acid (CENTRUM WOMEN ORAL) Take by mouth. 2/11/2022: HOLD UNTIL AFTER SURGERY      olmesartan-hydrochlorothiazide (BENICAR HCT) 40-12.5 mg Tab TAKE 1 TABLET BY MOUTH EVERY DAY  90 tablet 1    prasterone, dhea, (INTRAROSA) 6.5 mg Inst Place 6.5 mg vaginally every evening.  30 each 3    scopolamine (TRANSDERM-SCOP) 1.3-1.5 mg (1 mg over 3 days) Place 1 patch onto the skin every 72 hours.  4 patch 0    traZODone (DESYREL) 50 MG tablet TAKE 1 TABLET BY MOUTH NIGHTLY AS NEEDED FOR INSOMNIA.  90 tablet 0    [DISCONTINUED] anastrozole (ARIMIDEX) 1 mg Tab TAKE 1 TABLET BY MOUTH EVERY DAY  90 tablet 2    [DISCONTINUED] hydroCHLOROthiazide (HYDRODIURIL) 12.5 MG Tab TAKE 1 TABLET BY MOUTH EVERY DAY  90 tablet 0     No facility-administered encounter medications on file as of 11/6/2023.       Allergies:  Review of patient's allergies indicates:   Allergen Reactions    Naproxen Itching    Sulfa (sulfonamide antibiotics) Nausea Only       Health Maintenance:  Immunization History   Administered Date(s) Administered    COVID-19, MRNA, LN-S, PF (Pfizer) (Purple Cap) 02/25/2021, 03/18/2021, 11/09/2021    Influenza (FLUBLOK) - Quadrivalent - Recombinant - PF *Preferred* (egg allergy) 10/05/2020    Influenza - Quadrivalent 11/24/2014    Influenza - Quadrivalent - MDCK - PF 10/05/2022, 09/27/2023    Influenza - Quadrivalent - PF *Preferred* (6 months and older) 08/28/2019, 01/22/2020, 10/22/2021    Tdap 07/31/2017      Health Maintenance   Topic Date Due    Shingles Vaccine (1 of 2) Never done    TETANUS VACCINE  07/31/2027    Lipid Panel  10/31/2027    Colorectal Cancer Screening  01/26/2028    Hepatitis C Screening  Completed        Physical  "Exam      Vital Signs  Pulse: 85  SpO2: (!) 94 %  BP: 116/74  BP Location: Right arm  Patient Position: Sitting  Pain Score: 0-No pain  Height and Weight  Height: 4' 11" (149.9 cm)  Weight: 71.9 kg (158 lb 8.2 oz)  BSA (Calculated - sq m): 1.73 sq meters  BMI (Calculated): 32  Weight in (lb) to have BMI = 25: 123.5]    Physical Exam  Vitals reviewed.   Constitutional:       Appearance: She is well-developed.   HENT:      Head: Normocephalic and atraumatic.      Right Ear: External ear normal.      Left Ear: External ear normal.   Cardiovascular:      Rate and Rhythm: Normal rate and regular rhythm.      Heart sounds: Normal heart sounds. No murmur heard.  Pulmonary:      Effort: Pulmonary effort is normal.      Breath sounds: Normal breath sounds. No wheezing or rales.   Abdominal:      General: Bowel sounds are normal. There is no distension.      Palpations: Abdomen is soft.      Tenderness: There is no abdominal tenderness.          Laboratory:  CBC:  Recent Labs   Lab 11/09/21  0843 12/21/21  1006 10/31/22  0857   WBC 6.75 7.01 6.60   RBC 4.60 4.90 4.82   Hemoglobin 13.1 14.0 13.6   Hematocrit 40.2 44.8 42.1   Platelets 292 305 266   MCV 87 91 87   MCH 28.5 28.6 28.2   MCHC 32.6 31.3 L 32.3     CMP:  Recent Labs   Lab 11/09/21  0843 02/16/22  1353 10/31/22  0856   Glucose 99   < > 112 H   Calcium 9.8   < > 9.6   Albumin 4.8  --  4.6   Total Protein 7.7  --  7.9   Sodium 143   < > 145   Potassium 3.9   < > 4.0   CO2 30 H   < > 32 H   Chloride 104   < > 102   BUN 21 H   < > 19 H   Alkaline Phosphatase 72  --  76   ALT 42  --  50 H   AST 26  --  30   Total Bilirubin 0.4  --  0.3    < > = values in this interval not displayed.     URINALYSIS:       LIPIDS:  Recent Labs   Lab 11/09/21  0843 10/31/22  0856   TSH 0.895 1.170   HDL 40 37 L   Cholesterol 198 195   Triglycerides 229 H 257 H   LDL Cholesterol 112.2 106.6   HDL/Cholesterol Ratio 20.2 19.0 L   Non-HDL Cholesterol 158 158   Total Cholesterol/HDL Ratio 5.0 5.3 " H     TSH:  Recent Labs   Lab 11/09/21  0843 10/31/22  0856   TSH 0.895 1.170     A1C:        Assessment/Plan     Natasha Espinal is a 57 y.o.female with:    1. Annual physical exam  - CBC Auto Differential; Future  - Comprehensive Metabolic Panel; Future  - Lipid Panel; Future  - TSH; Future  - Hemoglobin A1C; Future  Discussed diet and exercise, vaccines and cancer screening, risk factors.  Screening labs ordered.     2. Essential hypertension  Continue current meds.    3. Mixed hyperlipidemia  Counseled on diet.  Update labs.  4. History of breast cancer  Continue current meds.  F/U with oncology  5. Anxiety  Continue to monitor  6. Primary insomnia  Continue current meds.    7. Impaired fasting glucose  - Hemoglobin A1C; Future    8. Encounter for colorectal cancer screening  - Fecal Immunochemical Test (iFOBT); Future       Chronic conditions status updated as per HPI.  Other than changes above, cont current medications and maintain follow up with specialists.  No follow-ups on file.    Future Appointments   Date Time Provider Department Center   11/10/2023 10:40 AM [facility], DEXA1 [facility] [address]   11/13/2023 10:00 AM Orlando Olivier MD [facility] [id number] Sami Espinoza MD  [facility]

## 2023-11-10 ENCOUNTER — HOSPITAL ENCOUNTER (OUTPATIENT)
Dept: RADIOLOGY | Facility: CLINIC | Age: 58
Discharge: HOME | End: 2023-11-10
Attending: INTERNAL MEDICINE
Payer: COMMERCIAL

## 2023-11-10 DIAGNOSIS — Z17.0 CARCINOMA OF AXILLARY TAIL OF LEFT BREAST IN FEMALE, ESTROGEN RECEPTOR POSITIVE: ICD-10-CM

## 2023-11-10 DIAGNOSIS — C50.612 CARCINOMA OF AXILLARY TAIL OF LEFT BREAST IN FEMALE, ESTROGEN RECEPTOR POSITIVE: ICD-10-CM

## 2023-11-10 PROCEDURE — 77080 DXA BONE DENSITY AXIAL: CPT | Mod: TC

## 2023-11-10 PROCEDURE — 77080 DXA BONE DENSITY AXIAL: CPT | Mod: 26,,, | Performed by: INTERNAL MEDICINE

## 2023-11-10 PROCEDURE — 77080 DXA BONE DENSITY AXIAL SKELETON 1 OR MORE SITES: ICD-10-PCS | Mod: 26,,, | Performed by: INTERNAL MEDICINE

## 2023-11-13 ENCOUNTER — TELEPHONE (OUTPATIENT)
Dept: PRIMARY CARE CLINIC | Facility: CLINIC | Age: 58
End: 2023-11-13
Payer: COMMERCIAL

## 2023-11-13 ENCOUNTER — OFFICE VISIT (OUTPATIENT)
Dept: HEMATOLOGY/ONCOLOGY | Facility: CLINIC | Age: 58
End: 2023-11-13
Payer: COMMERCIAL

## 2023-11-13 VITALS
SYSTOLIC BLOOD PRESSURE: 112 MMHG | TEMPERATURE: 98 F | BODY MASS INDEX: 31.46 KG/M2 | HEIGHT: 59 IN | DIASTOLIC BLOOD PRESSURE: 74 MMHG | WEIGHT: 156.06 LBS | HEART RATE: 83 BPM | OXYGEN SATURATION: 96 % | RESPIRATION RATE: 16 BRPM

## 2023-11-13 DIAGNOSIS — C50.612 CARCINOMA OF AXILLARY TAIL OF LEFT BREAST IN FEMALE, ESTROGEN RECEPTOR POSITIVE: Primary | ICD-10-CM

## 2023-11-13 DIAGNOSIS — M85.80 OSTEOPENIA, UNSPECIFIED LOCATION: Primary | ICD-10-CM

## 2023-11-13 DIAGNOSIS — M81.0 AGE-RELATED OSTEOPOROSIS WITHOUT CURRENT PATHOLOGICAL FRACTURE: ICD-10-CM

## 2023-11-13 DIAGNOSIS — Z79.811 USE OF AROMATASE INHIBITORS: ICD-10-CM

## 2023-11-13 DIAGNOSIS — Z17.0 CARCINOMA OF AXILLARY TAIL OF LEFT BREAST IN FEMALE, ESTROGEN RECEPTOR POSITIVE: Primary | ICD-10-CM

## 2023-11-13 PROCEDURE — 99215 OFFICE O/P EST HI 40 MIN: CPT | Mod: S$GLB,,, | Performed by: INTERNAL MEDICINE

## 2023-11-13 PROCEDURE — 99215 PR OFFICE/OUTPT VISIT, EST, LEVL V, 40-54 MIN: ICD-10-PCS | Mod: S$GLB,,, | Performed by: INTERNAL MEDICINE

## 2023-11-13 PROCEDURE — 99999 PR PBB SHADOW E&M-EST. PATIENT-LVL V: ICD-10-PCS | Mod: PBBFAC,,, | Performed by: INTERNAL MEDICINE

## 2023-11-13 PROCEDURE — 3008F BODY MASS INDEX DOCD: CPT | Mod: CPTII,S$GLB,, | Performed by: INTERNAL MEDICINE

## 2023-11-13 PROCEDURE — 3044F PR MOST RECENT HEMOGLOBIN A1C LEVEL <7.0%: ICD-10-PCS | Mod: CPTII,S$GLB,, | Performed by: INTERNAL MEDICINE

## 2023-11-13 PROCEDURE — 3074F PR MOST RECENT SYSTOLIC BLOOD PRESSURE < 130 MM HG: ICD-10-PCS | Mod: CPTII,S$GLB,, | Performed by: INTERNAL MEDICINE

## 2023-11-13 PROCEDURE — 3078F DIAST BP <80 MM HG: CPT | Mod: CPTII,S$GLB,, | Performed by: INTERNAL MEDICINE

## 2023-11-13 PROCEDURE — 3044F HG A1C LEVEL LT 7.0%: CPT | Mod: CPTII,S$GLB,, | Performed by: INTERNAL MEDICINE

## 2023-11-13 PROCEDURE — 3074F SYST BP LT 130 MM HG: CPT | Mod: CPTII,S$GLB,, | Performed by: INTERNAL MEDICINE

## 2023-11-13 PROCEDURE — 3078F PR MOST RECENT DIASTOLIC BLOOD PRESSURE < 80 MM HG: ICD-10-PCS | Mod: CPTII,S$GLB,, | Performed by: INTERNAL MEDICINE

## 2023-11-13 PROCEDURE — 1159F MED LIST DOCD IN RCRD: CPT | Mod: CPTII,S$GLB,, | Performed by: INTERNAL MEDICINE

## 2023-11-13 PROCEDURE — 1159F PR MEDICATION LIST DOCUMENTED IN MEDICAL RECORD: ICD-10-PCS | Mod: CPTII,S$GLB,, | Performed by: INTERNAL MEDICINE

## 2023-11-13 PROCEDURE — 3008F PR BODY MASS INDEX (BMI) DOCUMENTED: ICD-10-PCS | Mod: CPTII,S$GLB,, | Performed by: INTERNAL MEDICINE

## 2023-11-13 PROCEDURE — 99999 PR PBB SHADOW E&M-EST. PATIENT-LVL V: CPT | Mod: PBBFAC,,, | Performed by: INTERNAL MEDICINE

## 2023-11-13 NOTE — PROGRESS NOTES
Subjective:       Patient ID: Natasha Espinal is a 57 y.o. female.    Chief Complaint: No chief complaint on file.      HPI     Mrs. Espinal returns today for follow-up.  I had last seen her in April 2023.  Of note, she has been on anastrazole since March 2018, and so far has tolerated it OK.      Briefly, she is a 57-year-old  female who was referred for evaluation after undergoing bilateral nipple-sparing mastectomies.   On the left nipple-sparing mastectomy specimen, there was a 3.4 cm tumor.  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  There was no evidence of DCIS or malignancy on the right mastectomy specimen.  A MammaPrint test was sent and suggested that she had a 97.8% chance of disease free survival at five years with hormonal therapy.    Her DXA scan on on November 5, 2021 had shown mild osteopenia of the hip and osteopenia approaching osteoporosis at the spine.    Mrs. Mcadams is known to have extrathoracic tracheal stenosis.  She underwent a tracheal dilatation with immediate improvement of her symptoms.      Review of Systems      Overall she feels OK.  ECOG PS is 1.  She does have some muscular and joint pains presumably from the a ice, but she denies any depression, easy bruising, fevers, chills, night  sweats, weight loss, nausea, vomiting, diarrhea, constipation, diplopia, blurred vision, headache, chest pain, palpitations, shortness of breath, breast pain, abdominal pain, or difficulty ambulating.  The remainder of the ten-point ROS, including general, skin, lymph, H/N, cardiorespiratory, GI, , Neuro, Endocrine, and psychiatric is negative.   When asked specifically about symptoms of tracheal stenosis she states that since she had dilatation a year ago she has had no symptoms      Objective:      Physical Exam        She is alert, oriented to time, place, person, pleasant, well nourished, in no acute physical distress.                                VITAL  SIGNS:  Reviewed.                                   HEENT:  Normal.  There are no nasal, oral, lip, gingival, auricular, lid,    or conjunctival lesions.  Mucosae are moist and pink, and there is no        thrush.  Pupils are equal, reactive to light and accommodation.              Extraocular muscle movements are intact.  Dentition is good.  There is no frontal or maxillary tenderness.                                     NECK:  Supple without JVD, adenopathy, or thyromegaly.                       LUNGS:  Clear to auscultation without wheezing, rales, or rhonchi.           CARDIOVASCULAR:  Reveals an S1, S2, no murmurs, no rubs, no gallops.         ABDOMEN:  Soft, nontender, without organomegaly.  Bowel sounds are    present.   Her SHRUTHI flap incision has healed nicely.                                                                 EXTREMITIES:  No cyanosis, clubbing, or edema.                             BREASTS:  She is status post bilateral nipples paring mastectomies.     Her incisions are well healed and no masses are palpated.                               LYMPHATIC:  There is no cervical, axillary, or supraclavicular adenopathy.   SKIN:  Warm and moist, without petechiae, rashes, induration, or ecchymoses.           NEUROLOGIC:  DTRs are 0-1+ bilaterally, symmetrical, motor function is 5/5,  and cranial nerves are  within normal limits.    Assessment:       1. Carcinoma of axillary tail of left breast in female, estrogen receptor positive    2. Use of aromatase inhibitors         3.  Osteopenia approaching osteoporosis on the spine  Plan:        I had a long discussion with her.  She does have reflux and she will probably need denosumab injections every 6 months for her osteoporosis.  I will e-mail her primary care physician and she will reach out to him later this week.  She was advised to obtain a dental clearance for now.  Assuming that her primary care physician will be the one arranging for the denosumab  injections, I will see her again in 6 months.    She had questions about her follow up and we reviewed the [employer] March 1, 2012 paper delineating the standard of care.  Finally, she will remain on the anastrazole in the extended adjuvant setting to complete 7 years of adjuvant hormonal therapy since we were not able to run the BCI test due to financial constraints.  I spent more than 40 minutes reviewing the available records and evaluating the patient, out of which over 50% of the time was spent face to face with the patient in counseling and coordinating this patient's care.    Her multiple questions were answered to her satisfaction.  Route Chart for Scheduling    Med Onc Chart Routing      Follow up with physician 6 months. See me in 6 months unless her PCP wants us to handle her osteoporosis   Follow up with JAYASHREE    Infusion scheduling note    Injection scheduling note    Labs    Imaging    Pharmacy appointment    Other referrals

## 2023-11-13 NOTE — TELEPHONE ENCOUNTER
Please let pt know prolia has been ordered as requested by her oncologist.  They should contact her to schedule.

## 2023-11-15 ENCOUNTER — PATIENT MESSAGE (OUTPATIENT)
Dept: HEMATOLOGY/ONCOLOGY | Facility: CLINIC | Age: 58
End: 2023-11-15
Payer: COMMERCIAL

## 2023-11-27 ENCOUNTER — PATIENT MESSAGE (OUTPATIENT)
Dept: PRIMARY CARE CLINIC | Facility: CLINIC | Age: 58
End: 2023-11-27
Payer: COMMERCIAL

## 2023-12-04 ENCOUNTER — DOCUMENTATION ONLY (OUTPATIENT)
Dept: SURGERY | Facility: CLINIC | Age: 58
End: 2023-12-04
Payer: COMMERCIAL

## 2023-12-12 ENCOUNTER — LAB VISIT (OUTPATIENT)
Dept: LAB | Facility: HOSPITAL | Age: 58
End: 2023-12-12
Attending: INTERNAL MEDICINE
Payer: COMMERCIAL

## 2023-12-12 DIAGNOSIS — Z12.12 ENCOUNTER FOR COLORECTAL CANCER SCREENING: ICD-10-CM

## 2023-12-12 DIAGNOSIS — Z12.11 ENCOUNTER FOR COLORECTAL CANCER SCREENING: ICD-10-CM

## 2023-12-12 LAB — HEMOCCULT STL QL IA: NEGATIVE

## 2023-12-12 PROCEDURE — 82274 ASSAY TEST FOR BLOOD FECAL: CPT | Performed by: INTERNAL MEDICINE

## 2024-02-07 DIAGNOSIS — I10 ESSENTIAL HYPERTENSION: ICD-10-CM

## 2024-02-07 RX ORDER — OLMESARTAN MEDOXOMIL AND HYDROCHLOROTHIAZIDE 40/12.5 40; 12.5 MG/1; MG/1
TABLET ORAL
Qty: 90 TABLET | Refills: 2 | Status: SHIPPED | OUTPATIENT
Start: 2024-02-07

## 2024-02-07 NOTE — TELEPHONE ENCOUNTER
No care due was identified.  Health [facility] Embedded Care Due Messages. Reference number: [id number].   2/07/2024 8:25:22 AM CST

## 2024-02-07 NOTE — TELEPHONE ENCOUNTER
Refill Decision Note   Natasha Kylie  is requesting a refill authorization.  Brief Assessment and Rationale for Refill:  Approve     Medication Therapy Plan:        Comments:     Note composed:11:09 AM 02/07/2024

## 2024-02-26 RX ORDER — HYDROCHLOROTHIAZIDE 12.5 MG/1
TABLET ORAL
Qty: 90 TABLET | Refills: 2 | Status: SHIPPED | OUTPATIENT
Start: 2024-02-26

## 2024-02-26 NOTE — TELEPHONE ENCOUNTER
No care due was identified.  Health [facility] Embedded Care Due Messages. Reference number: [id number].   2/26/2024 12:12:16 AM CST

## 2024-02-26 NOTE — TELEPHONE ENCOUNTER
Refill Decision Note   Natasha Espinal  is requesting a refill authorization.  Brief Assessment and Rationale for Refill:  Approve     Medication Therapy Plan:         Comments:     Note composed:3:47 AM 02/26/2024

## 2024-04-08 ENCOUNTER — OFFICE VISIT (OUTPATIENT)
Dept: OTOLARYNGOLOGY | Facility: CLINIC | Age: 59
End: 2024-04-08
Payer: COMMERCIAL

## 2024-04-08 VITALS
SYSTOLIC BLOOD PRESSURE: 114 MMHG | WEIGHT: 165.13 LBS | BODY MASS INDEX: 33.35 KG/M2 | HEART RATE: 100 BPM | DIASTOLIC BLOOD PRESSURE: 75 MMHG

## 2024-04-08 DIAGNOSIS — R13.14 PHARYNGOESOPHAGEAL DYSPHAGIA: Primary | ICD-10-CM

## 2024-04-08 DIAGNOSIS — J39.8 TRACHEAL STENOSIS: ICD-10-CM

## 2024-04-08 PROCEDURE — 1159F MED LIST DOCD IN RCRD: CPT | Mod: CPTII,S$GLB,, | Performed by: NURSE PRACTITIONER

## 2024-04-08 PROCEDURE — 1160F RVW MEDS BY RX/DR IN RCRD: CPT | Mod: CPTII,S$GLB,, | Performed by: NURSE PRACTITIONER

## 2024-04-08 PROCEDURE — 3078F DIAST BP <80 MM HG: CPT | Mod: CPTII,S$GLB,, | Performed by: NURSE PRACTITIONER

## 2024-04-08 PROCEDURE — 3008F BODY MASS INDEX DOCD: CPT | Mod: CPTII,S$GLB,, | Performed by: NURSE PRACTITIONER

## 2024-04-08 PROCEDURE — 3074F SYST BP LT 130 MM HG: CPT | Mod: CPTII,S$GLB,, | Performed by: NURSE PRACTITIONER

## 2024-04-08 PROCEDURE — 99214 OFFICE O/P EST MOD 30 MIN: CPT | Mod: 25,S$GLB,, | Performed by: NURSE PRACTITIONER

## 2024-04-08 PROCEDURE — 31575 DIAGNOSTIC LARYNGOSCOPY: CPT | Mod: S$GLB,,, | Performed by: NURSE PRACTITIONER

## 2024-04-08 PROCEDURE — 99999 PR PBB SHADOW E&M-EST. PATIENT-LVL IV: CPT | Mod: PBBFAC,,, | Performed by: NURSE PRACTITIONER

## 2024-04-08 NOTE — PROGRESS NOTES
"[facility]  Department of Otorhinolaryngology and Communication Sciences    Natasha Espinal is a 58 y.o. female who returns for a follow up visit. Her breathing has significantly improved since surgery. She does not have any wheezing or stridor. She does report dysphagia. Food seems to get stuck in her esophagus. She has to regurgitate sometimes when food will not go down. This seems to be worsening in severity.    She initially presented to the [facility] for consultation at the kind request of Dr. Porter Castaneda for further management of tracheal stenosis.    She complained of mild dyspnea and wheezing.  She has tracheal stenosis, ultimately identified in May 2018 when she was found to be a very difficult intubation for breast reconstruction surgery. She was connected to Dr. Ridley, who brought her for endoscopic airway intervention in Sept 2018. She had done well since then but was lost to follow up due to high out of pocket costs with her insurance plan.     3 years ago she noticed exertional dyspnea an noisy breathing when she was [place] the [place]. Since then she has been aware of mild exertional dyspnea, phonatory dyspnea, and mild inspiratory airway noise. Symptoms are stable and not deteriorating. Symptoms are nonresponsive to bronchodilator.    She had PFTs with pulm recently  FVL 11/29/21  Truncation and flattening of expiratory flow volume loop; some truncation of inspiratory loop  "No convincing evidence of reactive airway disease. No bronchodilator response. PFT not consistent with asthma."    Has a prior longstanding history of bad sinus problems, needed sinus surgery  No personal or family history of rheumatologic disorders.    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          1/25/2022     5:34 PM   Dyspnea Index Score   Dyspnea Index Total Score  20          No data to display                Past Medical History  She has a past medical history of Allergy, Asthma, Breast " cancer, and Hypertension.    Past Surgical History  She has a past surgical history that includes Hysterectomy (2011); Oophorectomy; deviated septum repair; Mastectomy (Bilateral, 02/2018); Breast biopsy (Left, 12/2017); Breast biopsy (Left, 1983); Breast reconstruction (Bilateral); scar tissue removal in airway  (Bilateral, 08/01/2018); Knee surgery (Left, 01/06/2020); Sinus surgery; Direct laryngobronchoscopy (N/A, 02/17/2022); Dilation of trachea (02/17/2022); Injection of steroid (02/17/2022); and Cosmetic surgery (2018).    Family History  Her family history includes Arthritis in her father and mother; Asthma in her brother and mother; Breast cancer (age of onset: 43) in her paternal aunt; Breast cancer (age of onset: 55) in her paternal aunt; Breast cancer (age of onset: 62) in her paternal aunt; COPD in her father; Colon cancer in her paternal uncle; Hearing loss in her father and mother; Heart disease in her father.    Social History  She reports that she has never smoked. She has never used smokeless tobacco. She reports that she does not currently use alcohol. She reports that she does not use drugs.    Allergies  She is allergic to naproxen and sulfa (sulfonamide antibiotics).    Medications  She has a current medication list which includes the following prescription(s): amlodipine, anastrozole, ascorbate calcium (vitamin c), aspirin, cetirizine, cholecalciferol (vitamin d3), famotidine, fish oil-omega-3 fatty acids, fluticasone propionate, hydrochlorothiazide, hydroxyzine hcl, melatonin, multivit-iron-min-folic acid, multivitamin/iron/folic acid, olmesartan-hydrochlorothiazide, and trazodone.    Review of Systems   Constitutional: Negative.  Negative for fever.   HENT: Negative.  Negative for mouth sores, postnasal drip, sinus pressure, sore throat, trouble swallowing and voice change.    Eyes: Negative.  Negative for visual disturbance.   Respiratory: Negative.  Negative for cough, shortness of breath  and wheezing.    Cardiovascular: Negative.  Negative for chest pain.   Gastrointestinal: Negative.  Negative for nausea.   Endocrine: Negative.    Genitourinary: Negative.    Musculoskeletal: Negative.  Negative for arthralgias.   Skin: Negative.  Negative for rash.   Allergic/Immunologic: Negative.    Neurological: Negative.  Negative for tremors.   Hematological: Negative.  Does not bruise/bleed easily.   Psychiatric/Behavioral: Negative.  The patient is not nervous/anxious.           Objective:     /75 (BP Location: Right arm, Patient Position: Sitting, BP Method: Large (Automatic))   Pulse 100   Wt 74.9 kg (165 lb 2 oz)   BMI 33.35 kg/m²      Physical Exam  Constitutional: comfortable, well dressed  Psychiatric: appropriate affect  Respiratory: comfortably breathing, symmetric chest rise, no stridor  Voice: faint inconsistent inspiratory stridor  Cardiovascular: upper extremities non-edematous  Lymphatic: no cervical lymphadenopathy  Neurologic: alert and oriented to time, place, person, and situation; cranial nerves 3-12 grossly intact  Head: normocephalic  Eyes: conjunctivae and sclerae clear  Ears: normal pinnae, normal external auditory canals, tympanic membranes intact  Nose: mucosa pink and noncongested, no masses, no mucopurulence, no polyps  Oral cavity / oropharynx: no mucosal lesions  Neck: soft, full range of motion, laryngotracheal complex palpable with appropriate landmarks, larynx elevates on swallowing  Indirect laryngoscopy: limited due to gag    Procedure  Flexible Laryngoscopy (67115): Laryngoscopy is indicated for assessment of upper aerodigestive structure and function. This was carried out transnasally with a distal chip videoendoscope. After verbal consent was obtained, the patient was positioned and the nose was topically decongested with 1% phenylephrine and topically anesthetized with 4% lidocaine. The endoscope was passed through the most patent nasal cavity and positioned to  image the nasopharynx, larynx, and hypopharynx in detail. The following features were examined: nasopharyngeal, laryngeal, hypopharyngeal masses; velopharyngeal strength, closure, and symmetry of motion; vocal fold range and symmetry of motion; laryngeal mucosal edema, erythema, inflammation, and hydration; salivary pooling; and gross laryngeal sensation. The equipment was removed. The patient tolerated the procedure well without complication. All findings were normal except:  - significant improvement in airway, stable compared to previous exam    Assessment:     Natasha Espinal is a 58 y.o. female with chronic tracheal stenosis, suspected to be idiopathic. Now 2 years s/p suspension microlaryngoscopy, CO2 laser excision, steroid injection, balloon dilation. Will have her see GI for dysphagia. RTC here in 1 year, sooner with any changes.       Plan:        Problem List Items Addressed This Visit          Pulmonary    Tracheal stenosis     Other Visit Diagnoses       Pharyngoesophageal dysphagia    -  Primary    Relevant Orders    FL Esophagram Complete    Ambulatory referral/consult to Gastroenterology

## 2024-05-08 ENCOUNTER — OFFICE VISIT (OUTPATIENT)
Dept: GASTROENTEROLOGY | Facility: CLINIC | Age: 59
End: 2024-05-08
Payer: COMMERCIAL

## 2024-05-08 VITALS
BODY MASS INDEX: 33.26 KG/M2 | DIASTOLIC BLOOD PRESSURE: 70 MMHG | WEIGHT: 165 LBS | HEIGHT: 59 IN | HEART RATE: 84 BPM | SYSTOLIC BLOOD PRESSURE: 113 MMHG

## 2024-05-08 DIAGNOSIS — R13.10 DYSPHAGIA, UNSPECIFIED TYPE: Primary | ICD-10-CM

## 2024-05-08 PROCEDURE — 3074F SYST BP LT 130 MM HG: CPT | Mod: CPTII,S$GLB,, | Performed by: NURSE PRACTITIONER

## 2024-05-08 PROCEDURE — 1159F MED LIST DOCD IN RCRD: CPT | Mod: CPTII,S$GLB,, | Performed by: NURSE PRACTITIONER

## 2024-05-08 PROCEDURE — 99999 PR PBB SHADOW E&M-EST. PATIENT-LVL V: CPT | Mod: PBBFAC,,, | Performed by: NURSE PRACTITIONER

## 2024-05-08 PROCEDURE — 3008F BODY MASS INDEX DOCD: CPT | Mod: CPTII,S$GLB,, | Performed by: NURSE PRACTITIONER

## 2024-05-08 PROCEDURE — 3078F DIAST BP <80 MM HG: CPT | Mod: CPTII,S$GLB,, | Performed by: NURSE PRACTITIONER

## 2024-05-08 PROCEDURE — 1160F RVW MEDS BY RX/DR IN RCRD: CPT | Mod: CPTII,S$GLB,, | Performed by: NURSE PRACTITIONER

## 2024-05-08 PROCEDURE — 99204 OFFICE O/P NEW MOD 45 MIN: CPT | Mod: S$GLB,,, | Performed by: NURSE PRACTITIONER

## 2024-05-08 RX ORDER — PANTOPRAZOLE SODIUM 40 MG/1
40 TABLET, DELAYED RELEASE ORAL DAILY
Qty: 30 TABLET | Refills: 2 | Status: SHIPPED | OUTPATIENT
Start: 2024-05-08 | End: 2025-05-08

## 2024-05-08 NOTE — PROGRESS NOTES
Subjective:       Patient ID: Natasha Espinal is a 58 y.o. female.    Chief Complaint: Dysphagia    57 y/o female with HTN and hx of breast cancer and tracheal stenosis referred by ENT for dysphagia. Patient reports difficulty swallow solid food that is progressively worsening over the last 2-3 months. Food feels stuck her chest and hard to go down. Has associated regurgitation. Often happens when eating salad, chicken and steak. Per chart review she has a history of tracheal stenosis in 2018 when she was found to be a very difficult intubation for breast reconstruction surgery. She underwent a tracheal dilatation with symptom improvement. Today she reports no difficulty breathing or wheezing.   Laryngoscopy 4/8/2024 was normal. Esophagram pending.         Past Medical History:   Diagnosis Date    Allergy     Asthma     Breast cancer 12/2017    left     Hypertension        Past Surgical History:   Procedure Laterality Date    BREAST BIOPSY Left 12/2017    BREAST BIOPSY Left 1983    exc bx    BREAST RECONSTRUCTION Bilateral     02/2018-05/2018 bryon flap    COSMETIC SURGERY  2018    Breast reconstruction    deviated septum repair      DILATION OF TRACHEA  02/17/2022    Procedure: DILATION, TRACHEA;  Surgeon: Mikey Galvez MD;  Location: [facility] OR [address] [place] [address];  Service: ENT;;    DIRECT LARYNGOBRONCHOSCOPY N/A 02/17/2022    Procedure: Suspension microlaryngoscopy, CO2 laser excision, steroid injection, balloon dilation;  Surgeon: Mikey Galvez MD;  Location: [facility] OR [address] [place] [address];  Service: ENT;  Laterality: N/A;  Microscope, telescopes, tower, airway basic, injector, Kenalog, airway balloons, CO2 laser/micromanipulator, rep conf# [id number] IC 2/3.    HYSTERECTOMY  2011    INJECTION OF STEROID  02/17/2022    Procedure: INJECTION, STEROID;  Surgeon: Mikey Galvez MD;  Location: [facility] OR [address] [place] [address];  Service: ENT;;    KNEE SURGERY Left 01/06/2020    MASTECTOMY Bilateral 02/2018    OOPHORECTOMY      scar tissue  removal in airway  Bilateral 08/01/2018    SINUS SURGERY         Family History   Problem Relation Name Age of Onset    Asthma Mother Tracey Hernadez     Arthritis Mother Tracey Hernadez     Hearing loss Mother Tracey Hernadez     Heart disease Father Flavio Hernadez     COPD Father Flavio Hernadez     Arthritis Father Flavio Hernadez     Hearing loss Father Flavio Hernadez     Breast cancer Paternal Aunt  43    Breast cancer Paternal Aunt  62    Breast cancer Paternal Aunt  55    Colon cancer Paternal Uncle      Asthma Brother Driss Jay     Ovarian cancer Neg Hx      Cancer Neg Hx         Social History     Socioeconomic History    Marital status:    Tobacco Use    Smoking status: Never    Smokeless tobacco: Never   Substance and Sexual Activity    Alcohol use: Not Currently    Drug use: Never    Sexual activity: Yes     Partners: Male     Birth control/protection: Partner-Vasectomy, Post-menopausal, See Surgical Hx     Social Determinants of Health     Financial Resource Strain: Low Risk  (11/8/2023)    Overall Financial Resource Strain (CARDIA)     Difficulty of Paying Living Expenses: Not very hard   Food Insecurity: No Food Insecurity (11/8/2023)    Hunger Vital Sign     Worried About Running Out of Food in the Last Year: Never true     Ran Out of Food in the Last Year: Never true   Transportation Needs: No Transportation Needs (11/8/2023)    PRAPARE - Transportation     Lack of Transportation (Medical): No     Lack of Transportation (Non-Medical): No   Physical Activity: Sufficiently Active (11/8/2023)    Exercise Vital Sign     Days of Exercise per Week: 4 days     Minutes of Exercise per Session: 60 min   Stress: No Stress Concern Present (11/8/2023)    [employer] - Occupational Stress Questionnaire     Feeling of Stress : Only a little   Housing Stability: Low Risk  (10/29/2022)    Housing Stability Vital Sign     Unable to Pay for Housing in the Last Year: No     Number of  "Places Lived in the Last Year: 1     Unstable Housing in the Last Year: No       Review of Systems   Constitutional:  Negative for appetite change and unexpected weight change.   HENT:  Positive for trouble swallowing.    Respiratory:  Negative for cough, choking, shortness of breath and wheezing.    Cardiovascular:  Negative for chest pain.   Gastrointestinal:  Negative for abdominal pain and nausea.   Psychiatric/Behavioral:  Negative for dysphoric mood.          Objective:     Vitals:    05/08/24 1008   BP: 113/70   BP Location: Right arm   Patient Position: Sitting   BP Method: Medium (Automatic)   Pulse: 84   Weight: 74.9 kg (165 lb 0.2 oz)   Height: 4' 11" (1.499 m)          Physical Exam  Constitutional:       General: She is not in acute distress.     Appearance: Normal appearance.   HENT:      Head: Normocephalic.   Eyes:      Conjunctiva/sclera: Conjunctivae normal.   Cardiovascular:      Rate and Rhythm: Normal rate and regular rhythm.   Pulmonary:      Effort: Pulmonary effort is normal. No respiratory distress.      Breath sounds: Normal breath sounds.   Musculoskeletal:         General: Normal range of motion.      Cervical back: Normal range of motion.   Skin:     General: Skin is warm and dry.   Neurological:      Mental Status: She is alert and oriented to person, place, and time.   Psychiatric:         Mood and Affect: Mood normal.         Behavior: Behavior normal.               Assessment:         ICD-10-CM ICD-9-CM   1. Dysphagia, unspecified type  R13.10 787.20   2. Pharyngoesophageal dysphagia  R13.14 787.24       Plan:       Dysphagia, unspecified type  -     Ambulatory referral/consult to Gastroenterology  -     pantoprazole (PROTONIX) 40 MG tablet; Take 1 tablet (40 mg total) by mouth once daily.  Dispense: 30 tablet; Refill: 2  -     Esophagram results pending    Follow up if symptoms worsen or fail to improve.     Patient's Medications   New Prescriptions    PANTOPRAZOLE (PROTONIX) 40 MG " TABLET    Take 1 tablet (40 mg total) by mouth once daily.   Previous Medications    AMLODIPINE (NORVASC) 10 MG TABLET    TAKE 1 TABLET BY MOUTH EVERY DAY IN THE EVENING    ANASTROZOLE (ARIMIDEX) 1 MG TAB    TAKE 1 TABLET BY MOUTH EVERY DAY    ASCORBATE CALCIUM 500 MG TAB    Take 1 tablet by mouth.    ASPIRIN (ECOTRIN) 81 MG EC TABLET    Take 81 mg by mouth once daily.    CETIRIZINE (ZYRTEC) 10 MG TABLET    Take 10 mg by mouth once daily.    CHOLECALCIFEROL, VITAMIN D3, (VITAMIN D3) 50 MCG (2,000 UNIT) CAP    Take 1 capsule by mouth once daily.    FISH OIL-OMEGA-3 FATTY ACIDS 300-1,000 MG CAPSULE    Take 1 g by mouth.    FLUTICASONE PROPIONATE (FLONASE) 50 MCG/ACTUATION NASAL SPRAY    1 spray (50 mcg total) by Each Nostril route once daily.    HYDROCHLOROTHIAZIDE (HYDRODIURIL) 12.5 MG TAB    TAKE 1 TABLET BY MOUTH EVERY DAY    HYDROXYZINE HCL (ATARAX) 25 MG TABLET    Take 1 tablet (25 mg total) by mouth 3 (three) times daily as needed for Itching.    MELATONIN 5 MG TAB    Take by mouth.    MULTIVIT-IRON-MIN-FOLIC ACID 3,500-18-0.4 UNIT-MG-MG ORAL CHEW    Take by mouth once daily.    MULTIVITAMIN/IRON/FOLIC ACID (CENTRUM WOMEN ORAL)    Take by mouth.    OLMESARTAN-HYDROCHLOROTHIAZIDE (BENICAR HCT) 40-12.5 MG TAB    TAKE 1 TABLET BY MOUTH EVERY DAY    TRAZODONE (DESYREL) 50 MG TABLET    TAKE 1 TABLET BY MOUTH NIGHTLY AS NEEDED FOR INSOMNIA.   Modified Medications    No medications on file   Discontinued Medications    FAMOTIDINE (PEPCID) 20 MG TABLET    Take 1 tablet (20 mg total) by mouth 2 (two) times daily.

## 2024-05-10 PROBLEM — R13.10 DYSPHAGIA: Status: ACTIVE | Noted: 2024-05-10

## 2024-05-13 ENCOUNTER — PATIENT MESSAGE (OUTPATIENT)
Dept: OTOLARYNGOLOGY | Facility: CLINIC | Age: 59
End: 2024-05-13
Payer: COMMERCIAL

## 2024-05-15 ENCOUNTER — OFFICE VISIT (OUTPATIENT)
Dept: HEMATOLOGY/ONCOLOGY | Facility: CLINIC | Age: 59
End: 2024-05-15
Payer: COMMERCIAL

## 2024-05-15 VITALS
BODY MASS INDEX: 33.38 KG/M2 | WEIGHT: 165.56 LBS | SYSTOLIC BLOOD PRESSURE: 128 MMHG | OXYGEN SATURATION: 95 % | HEIGHT: 59 IN | HEART RATE: 86 BPM | DIASTOLIC BLOOD PRESSURE: 80 MMHG | TEMPERATURE: 97 F

## 2024-05-15 DIAGNOSIS — C50.612 CARCINOMA OF AXILLARY TAIL OF LEFT BREAST IN FEMALE, ESTROGEN RECEPTOR POSITIVE: Primary | ICD-10-CM

## 2024-05-15 DIAGNOSIS — Z79.811 USE OF AROMATASE INHIBITORS: ICD-10-CM

## 2024-05-15 DIAGNOSIS — Z17.0 CARCINOMA OF AXILLARY TAIL OF LEFT BREAST IN FEMALE, ESTROGEN RECEPTOR POSITIVE: Primary | ICD-10-CM

## 2024-05-15 PROCEDURE — 99999 PR PBB SHADOW E&M-EST. PATIENT-LVL III: CPT | Mod: PBBFAC,,, | Performed by: INTERNAL MEDICINE

## 2024-05-15 PROCEDURE — 3008F BODY MASS INDEX DOCD: CPT | Mod: CPTII,S$GLB,, | Performed by: INTERNAL MEDICINE

## 2024-05-15 PROCEDURE — 99214 OFFICE O/P EST MOD 30 MIN: CPT | Mod: S$GLB,,, | Performed by: INTERNAL MEDICINE

## 2024-05-15 PROCEDURE — 1159F MED LIST DOCD IN RCRD: CPT | Mod: CPTII,S$GLB,, | Performed by: INTERNAL MEDICINE

## 2024-05-15 PROCEDURE — 1160F RVW MEDS BY RX/DR IN RCRD: CPT | Mod: CPTII,S$GLB,, | Performed by: INTERNAL MEDICINE

## 2024-05-15 PROCEDURE — 3079F DIAST BP 80-89 MM HG: CPT | Mod: CPTII,S$GLB,, | Performed by: INTERNAL MEDICINE

## 2024-05-15 PROCEDURE — 3074F SYST BP LT 130 MM HG: CPT | Mod: CPTII,S$GLB,, | Performed by: INTERNAL MEDICINE

## 2024-05-15 NOTE — PROGRESS NOTES
Stable, continue present management Subjective:       Patient ID: Natasha Espinal is a 58 y.o. female.    Chief Complaint: No chief complaint on file.      HPI     Mrs. Espinal returns today for follow-up.  I had last seen her on November 13, 2023.  Of note, she has been on anastrazole since March 2018, and so far has tolerated it OK.    It appears that the BCI test was finally run in late December 2023, and showed no benefit from extended treatment.      Briefly, she is a 58-year-old  female who was referred for evaluation after undergoing bilateral nipple-sparing mastectomies.   On the left nipple-sparing mastectomy specimen, there was a 3.4 cm tumor.  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  There was no evidence of DCIS or malignancy on the right mastectomy specimen.  A MammaPrint test was sent and suggested that she had a 97.8% chance of disease free survival at five years with hormonal therapy.    Her DXA scan on on November 5, 2021 had shown mild osteopenia of the hip and osteopenia approaching osteoporosis at the spine.    Mrs. Mcadams is known to have extrathoracic tracheal stenosis.  She underwent a tracheal dilatation with immediate improvement of her symptoms.      Review of Systems      Overall she feels OK.  ECOG PS is 1.  She does have some muscular and joint pains presumably from the a ice, but she denies any depression, easy bruising, fevers, chills, night  sweats, weight loss, nausea, vomiting, diarrhea, constipation, diplopia, blurred vision, headache, chest pain, palpitations, shortness of breath, breast pain, abdominal pain, or difficulty ambulating.  The remainder of the ten-point ROS, including general, skin, lymph, H/N, cardiorespiratory, GI, , Neuro, Endocrine, and psychiatric is negative.   When asked specifically about symptoms of tracheal stenosis she states that since she had dilatation a year ago she has had no symptoms      Objective:      Physical Exam        She is alert,  oriented to time, place, person, pleasant, well nourished, in no acute physical distress.                                VITAL SIGNS:  Reviewed.                                   HEENT:  Normal.  There are no nasal, oral, lip, gingival, auricular, lid,    or conjunctival lesions.  Mucosae are moist and pink, and there is no        thrush.  Pupils are equal, reactive to light and accommodation.              Extraocular muscle movements are intact.  Dentition is good.  There is no frontal or maxillary tenderness.                                     NECK:  Supple without JVD, adenopathy, or thyromegaly.                       LUNGS:  Clear to auscultation without wheezing, rales, or rhonchi.           CARDIOVASCULAR:  Reveals an S1, S2, no murmurs, no rubs, no gallops.         ABDOMEN:  Soft, nontender, without organomegaly.  Bowel sounds are    present.   Her SHRUTHI flap incision has healed nicely.                                                                 EXTREMITIES:  No cyanosis, clubbing, or edema.                             BREASTS:  She is status post bilateral nipples paring mastectomies.     Her incisions are well healed and no masses are palpated.                               LYMPHATIC:  There is no cervical, axillary, or supraclavicular adenopathy.   SKIN:  Warm and moist, without petechiae, rashes, induration, or ecchymoses.           NEUROLOGIC:  DTRs are 0-1+ bilaterally, symmetrical, motor function is 5/5,  and cranial nerves are  within normal limits.    Assessment:       1. Carcinoma of axillary tail of left breast in female, estrogen receptor positive    2. Use of aromatase inhibitors         3.  Osteopenia approaching osteoporosis on the spine  Plan:        I had a long discussion with her.  She remains in remission.  I asked her to discontinue the anastrazole since we were able to run the BCI test and it showed no benefit from extended treatment.  I spent more than 30 minutes reviewing the  available records and evaluating the patient, out of which over 50% of the time was spent face to face with the patient in counseling and coordinating this patient's care.    Her multiple questions were answered to her satisfaction.  Route Chart for Scheduling    Med Onc Chart Routing      Follow up with physician 1 year. no labs   Follow up with JAYASHREE    Infusion scheduling note    Injection scheduling note    Labs    Imaging    Pharmacy appointment    Other referrals            Therapy Plan Information  Medications  denosumab (PROLIA) injection 60 mg  60 mg, Subcutaneous, Every 26 weeks

## 2024-06-07 ENCOUNTER — OFFICE VISIT (OUTPATIENT)
Dept: PRIMARY CARE CLINIC | Facility: CLINIC | Age: 59
End: 2024-06-07
Payer: COMMERCIAL

## 2024-06-07 ENCOUNTER — HOSPITAL ENCOUNTER (OUTPATIENT)
Dept: RADIOLOGY | Facility: HOSPITAL | Age: 59
Discharge: HOME | End: 2024-06-07
Attending: INTERNAL MEDICINE
Payer: COMMERCIAL

## 2024-06-07 VITALS
HEIGHT: 59 IN | DIASTOLIC BLOOD PRESSURE: 76 MMHG | BODY MASS INDEX: 33.78 KG/M2 | WEIGHT: 167.56 LBS | SYSTOLIC BLOOD PRESSURE: 126 MMHG | OXYGEN SATURATION: 96 % | HEART RATE: 89 BPM

## 2024-06-07 DIAGNOSIS — E78.2 MIXED HYPERLIPIDEMIA: ICD-10-CM

## 2024-06-07 DIAGNOSIS — F41.9 ANXIETY: ICD-10-CM

## 2024-06-07 DIAGNOSIS — M25.551 RIGHT HIP PAIN: ICD-10-CM

## 2024-06-07 DIAGNOSIS — F51.01 PRIMARY INSOMNIA: ICD-10-CM

## 2024-06-07 DIAGNOSIS — Z85.3 HISTORY OF BREAST CANCER: ICD-10-CM

## 2024-06-07 DIAGNOSIS — I10 ESSENTIAL HYPERTENSION: Primary | ICD-10-CM

## 2024-06-07 DIAGNOSIS — Z00.00 ANNUAL PHYSICAL EXAM: ICD-10-CM

## 2024-06-07 PROCEDURE — 3008F BODY MASS INDEX DOCD: CPT | Mod: CPTII,S$GLB,, | Performed by: INTERNAL MEDICINE

## 2024-06-07 PROCEDURE — 73502 X-RAY EXAM HIP UNI 2-3 VIEWS: CPT | Mod: 26,RT,, | Performed by: RADIOLOGY

## 2024-06-07 PROCEDURE — 3074F SYST BP LT 130 MM HG: CPT | Mod: CPTII,S$GLB,, | Performed by: INTERNAL MEDICINE

## 2024-06-07 PROCEDURE — 99214 OFFICE O/P EST MOD 30 MIN: CPT | Mod: S$GLB,,, | Performed by: INTERNAL MEDICINE

## 2024-06-07 PROCEDURE — 3078F DIAST BP <80 MM HG: CPT | Mod: CPTII,S$GLB,, | Performed by: INTERNAL MEDICINE

## 2024-06-07 PROCEDURE — 99999 PR PBB SHADOW E&M-EST. PATIENT-LVL III: CPT | Mod: PBBFAC,,, | Performed by: INTERNAL MEDICINE

## 2024-06-07 PROCEDURE — 73502 X-RAY EXAM HIP UNI 2-3 VIEWS: CPT | Mod: TC,RT

## 2024-06-07 PROCEDURE — 1159F MED LIST DOCD IN RCRD: CPT | Mod: CPTII,S$GLB,, | Performed by: INTERNAL MEDICINE

## 2024-06-07 NOTE — PROGRESS NOTES
[facility] Note    Chief Complaint      Chief Complaint   Patient presents with    Follow-up     6 mth    Hypertension       History of Present Illness      Natasha Espinal is a 58 y.o. female with chronic conditions of HTN, HLD, hx of breast cancer, asthma, anxiety, insomnia, osteopenia who presents today for: follow up chronic conditions.  Complains right posterior hip pain for the past 2 months, worse when lying in bed after a more active day.  HTN: BP at goal on amlodipine, olmesartan-hydrochlorothiazide    HLD: Controlled off atorvastatin for the past few years.   last check.  Hx of breast cancer: Sees oncology, Dr. Olivier.  Off anastrazole 2024.  Myalgias improcving.  Asthma: Has albuterol as needed.  Has not needed rescue since last visit.  Anxiety: Controlled off effexor.  No new or worsening symptoms.    Insomnia: Partially controlled on trazodone.  Doing well on this regimen.     Osteopenia: FRAX does not meet criteria for treatment.  Repeat in 2025.  Flu shot UTD.  TdAP 2017.  Shingles vaccine discussed.  Pneumonia vaccine due age 64.  COVID vaccine UTD.  Mammogram N/A bilateral mastectomy.  DEXA 2023, osteopenia Dr. Boswell.  FIT 12/2023.       Past Medical History:  Past Medical History:   Diagnosis Date    Allergy     Asthma     Breast cancer 12/2017    left     Hypertension        Past Surgical History:   has a past surgical history that includes Hysterectomy (2011); Oophorectomy; deviated septum repair; Mastectomy (Bilateral, 02/2018); Breast biopsy (Left, 12/2017); Breast biopsy (Left, 1983); Breast reconstruction (Bilateral); scar tissue removal in airway  (Bilateral, 08/01/2018); Knee surgery (Left, 01/06/2020); Sinus surgery; Direct laryngobronchoscopy (N/A, 02/17/2022); Dilation of trachea (02/17/2022); Injection of steroid (02/17/2022); and Cosmetic surgery (2018).    Family History:  family history includes Arthritis in her father and mother; Asthma in her  brother and mother; Breast cancer (age of onset: 43) in her paternal aunt; Breast cancer (age of onset: 55) in her paternal aunt; Breast cancer (age of onset: 62) in her paternal aunt; COPD in her father; Colon cancer in her paternal uncle; Hearing loss in her father and mother; Heart disease in her father.     Social History:  Social History     Tobacco Use    Smoking status: Never    Smokeless tobacco: Never   Substance Use Topics    Alcohol use: Not Currently    Drug use: Never       I personally reviewed all past medical, surgical, social and family history.    Review of Systems   Constitutional:  Negative for chills, fever and malaise/fatigue.   Respiratory:  Negative for shortness of breath.    Cardiovascular:  Negative for chest pain.   Gastrointestinal:  Negative for constipation, diarrhea, nausea and vomiting.   Skin:  Negative for rash.   Neurological:  Negative for weakness.   All other systems reviewed and are negative.       Medications:  Outpatient Encounter Medications as of 6/7/2024   Medication Sig Note Dispense Refill    amLODIPine (NORVASC) 10 MG tablet TAKE 1 TABLET BY MOUTH EVERY DAY IN THE EVENING  90 tablet 3    ascorbate calcium 500 mg Tab Take 1 tablet by mouth. 2/11/2022: Hold 1 week prior to surgery          aspirin (ECOTRIN) 81 MG EC tablet Take 81 mg by mouth once daily. 2/11/2022: HOLD UNTIL AFTER SURGERY      cetirizine (ZYRTEC) 10 MG tablet Take 10 mg by mouth once daily.       cholecalciferol, vitamin D3, (VITAMIN D3) 50 mcg (2,000 unit) Cap Take 1 capsule by mouth once daily. 2/11/2022: HOLD UNTIL AFTER SURGERY        fish oil-omega-3 fatty acids 300-1,000 mg capsule Take 1 g by mouth. 2/11/2022: HOLD UNTIL AFTER SURGERY      fluticasone propionate (FLONASE) 50 mcg/actuation nasal spray 1 spray (50 mcg total) by Each Nostril route once daily.  11.1 mL 2    hydroCHLOROthiazide (HYDRODIURIL) 12.5 MG Tab TAKE 1 TABLET BY MOUTH EVERY DAY  90 tablet 2    hydrOXYzine HCL (ATARAX) 25 MG  tablet Take 1 tablet (25 mg total) by mouth 3 (three) times daily as needed for Itching.  60 tablet 1    melatonin 5 mg Tab Take by mouth.       multivitamin/iron/folic acid (CENTRUM WOMEN ORAL) Take by mouth. 2/11/2022: HOLD UNTIL AFTER SURGERY      olmesartan-hydrochlorothiazide (BENICAR HCT) 40-12.5 mg Tab TAKE 1 TABLET BY MOUTH EVERY DAY  90 tablet 2    pantoprazole (PROTONIX) 40 MG tablet Take 1 tablet (40 mg total) by mouth once daily.  30 tablet 2    traZODone (DESYREL) 50 MG tablet TAKE 1 TABLET BY MOUTH NIGHTLY AS NEEDED FOR INSOMNIA.  90 tablet 3    [DISCONTINUED] anastrozole (ARIMIDEX) 1 mg Tab TAKE 1 TABLET BY MOUTH EVERY DAY  90 tablet 2    [DISCONTINUED] MULTIVIT-IRON-MIN-FOLIC ACID 3,500-18-0.4 UNIT-MG-MG ORAL CHEW Take by mouth once daily. (Patient not taking: Reported on 5/15/2024) 2/11/2022: HOLD UNTIL AFTER SURGERY       No facility-administered encounter medications on file as of 6/7/2024.       Allergies:  Review of patient's allergies indicates:   Allergen Reactions    Naproxen Itching    Sulfa (sulfonamide antibiotics) Nausea Only       Health Maintenance:  Immunization History   Administered Date(s) Administered    COVID-19, MRNA, LN-S, PF (Pfizer) (Purple Cap) 02/25/2021, 03/18/2021, 11/09/2021    Influenza (FLUBLOK) - Quadrivalent - Recombinant - PF *Preferred* (egg allergy) 10/05/2020    Influenza - Quadrivalent 11/24/2014    Influenza - Quadrivalent - MDCK - PF 10/05/2022, 09/27/2023    Influenza - Quadrivalent - PF *Preferred* (6 months and older) 08/28/2019, 01/22/2020, 10/22/2021    Tdap 07/31/2017      Health Maintenance   Topic Date Due    Shingles Vaccine (1 of 2) Never done    DEXA Scan  11/10/2025    TETANUS VACCINE  07/31/2027    Colorectal Cancer Screening  01/26/2028    Lipid Panel  11/08/2028    Hepatitis C Screening  Completed        Physical Exam      Vital Signs  Pulse: 89  SpO2: 96 %  BP: 126/76  BP Location: Right arm  Patient Position: Sitting  Height and Weight  Height:  "4' 11" (149.9 cm)  Weight: 76 kg (167 lb 8.8 oz)  BSA (Calculated - sq m): 1.78 sq meters  BMI (Calculated): 33.8  Weight in (lb) to have BMI = 25: 123.5]    Physical Exam  Vitals reviewed.   Constitutional:       Appearance: She is well-developed.   HENT:      Head: Normocephalic and atraumatic.      Right Ear: External ear normal.      Left Ear: External ear normal.   Cardiovascular:      Rate and Rhythm: Normal rate and regular rhythm.      Heart sounds: Normal heart sounds. No murmur heard.  Pulmonary:      Effort: Pulmonary effort is normal.      Breath sounds: Normal breath sounds. No wheezing or rales.   Abdominal:      General: Bowel sounds are normal. There is no distension.      Palpations: Abdomen is soft.      Tenderness: There is no abdominal tenderness.          Laboratory:  CBC:  Recent Labs   Lab 12/21/21  1006 10/31/22  0857 11/08/23  0914   WBC 7.01 6.60 8.03   RBC 4.90 4.82 4.72   Hemoglobin 14.0 13.6 13.5   Hematocrit 44.8 42.1 41.2   Platelets 305 266 322   MCV 91 87 87   MCH 28.6 28.2 28.6   MCHC 31.3 L 32.3 32.8     CMP:  Recent Labs   Lab 11/09/21  0843 02/16/22  1353 10/31/22  0856 11/08/23  0914   Glucose 99   < > 112 H 101   Calcium 9.8   < > 9.6 9.8   Albumin 4.8  --  4.6 4.3   Total Protein 7.7  --  7.9 7.5   Sodium 143   < > 145 141   Potassium 3.9   < > 4.0 4.5   CO2 30 H   < > 32 H 28   Chloride 104   < > 102 103   BUN 21 H   < > 19 H 16   Alkaline Phosphatase 72  --  76 89   ALT 42  --  50 H 47 H   AST 26  --  30 37   Total Bilirubin 0.4  --  0.3 0.5    < > = values in this interval not displayed.     URINALYSIS:       LIPIDS:  Recent Labs   Lab 11/09/21  0843 10/31/22  0856 11/08/23  0914   TSH 0.895 1.170 1.070   HDL 40 37 L 36 L   Cholesterol 198 195 188   Triglycerides 229 H 257 H 115   LDL Cholesterol 112.2 106.6 129.0   HDL/Cholesterol Ratio 20.2 19.0 L 19.1 L   Non-HDL Cholesterol 158 158 152   Total Cholesterol/HDL Ratio 5.0 5.3 H 5.2 H     TSH:  Recent Labs   Lab " 11/09/21  0843 10/31/22  0856 11/08/23  0914   TSH 0.895 1.170 1.070     A1C:  Recent Labs   Lab 11/08/23  0914   Hemoglobin A1C 5.3       Assessment/Plan     Natasha Espinal is a 58 y.o.female with:    1. Essential hypertension  Continue current meds.    2. Mixed hyperlipidemia  - Comprehensive Metabolic Panel; Future  - Lipid Panel; Future  Counseled on diet and exericse.  4. History of breast cancer  - CBC Auto Differential; Future  F/U with oncology.    5. Anxiety  Continue current meds.    6. Primary insomnia  Continue current meds.    7. Annual physical exam  - CBC Auto Differential; Future  - Comprehensive Metabolic Panel; Future  - Lipid Panel; Future  - TSH; Future       Chronic conditions status updated as per HPI.  Other than changes above, cont current medications and maintain follow up with specialists.  No follow-ups on file.    No future appointments.    Ta Espinoza MD  [facility]

## 2024-06-08 NOTE — PROGRESS NOTES
Xrays do not show significant arthritis in either hip.  Very mild degenerative changes seen but likely wouldn't explain her symptoms.  Most likely, pain is being caused by something going on in muscle tissue.  Can refer to PT if symptoms persist or if pt prefers

## 2024-10-20 DIAGNOSIS — I10 ESSENTIAL HYPERTENSION: ICD-10-CM

## 2024-10-20 RX ORDER — OLMESARTAN MEDOXOMIL AND HYDROCHLOROTHIAZIDE 40/12.5 40; 12.5 MG/1; MG/1
TABLET ORAL
Qty: 90 TABLET | Refills: 0 | Status: SHIPPED | OUTPATIENT
Start: 2024-10-20

## 2024-10-20 NOTE — TELEPHONE ENCOUNTER
Care Due:                  Date            Visit Type   Department     Provider  --------------------------------------------------------------------------------                                EP -                              PRIMARY      [facility]  Last Visit: 06-      CARE ([facility])   CARE           Ta Walls                              EP -                              PRIMARY      [facility]  Next Visit: 12-      CARE ([facility])   CARE           Ta Walls                                                            Last  Test          Frequency    Reason                     Performed    Due Date  --------------------------------------------------------------------------------    CMP.........  12 months..  hydroCHLOROthiazide,       11- 11-                             olmesartan-hydrochlorothi                             azide....................    [employer] Embedded Care Due Messages. Reference number: [id number].   10/20/2024 1:56:54 AM CDT

## 2024-10-20 NOTE — TELEPHONE ENCOUNTER
Provider Staff:  Action required for this patient    Requires labs      Please see care gap opportunities below in Care Due Message.    Thanks!  [facility]     Appointments      Date Provider   Last Visit   6/7/2024 Ta Espinoza MD   Next Visit   12/9/2024 Ta Espinoza MD     Refill Decision Note   Natasha Espinal  is requesting a refill authorization.  Brief Assessment and Rationale for Refill:  Approve     Medication Therapy Plan:        Comments:     Note composed:4:37 PM 10/20/2024

## 2024-10-25 DIAGNOSIS — R13.10 DYSPHAGIA, UNSPECIFIED TYPE: ICD-10-CM

## 2024-10-25 RX ORDER — PANTOPRAZOLE SODIUM 40 MG/1
40 TABLET, DELAYED RELEASE ORAL
Qty: 90 TABLET | Refills: 3 | Status: SHIPPED | OUTPATIENT
Start: 2024-10-25

## 2024-11-02 ENCOUNTER — PATIENT MESSAGE (OUTPATIENT)
Dept: ADMINISTRATIVE | Facility: OTHER | Age: 59
End: 2024-11-02
Payer: COMMERCIAL

## 2024-11-23 NOTE — TELEPHONE ENCOUNTER
Refill Routing Note   Medication(s) are not appropriate for processing by [facility] for the following reason(s):        Required labs outdated    [facility] action(s):  Defer             Appointments  past 12m or future 3m with PCP    Date Provider   Last Visit   6/7/2024 Ta Espinoza MD   Next Visit   12/9/2024 Ta Espinoza MD   ED visits in past 90 days: 0        Note composed:8:43 AM 11/23/2024

## 2024-11-23 NOTE — TELEPHONE ENCOUNTER
No care due was identified.  [facility] Embedded Care Due Messages. Reference number: [id number].   11/23/2024 12:38:24 AM CST

## 2024-11-25 RX ORDER — HYDROCHLOROTHIAZIDE 12.5 MG/1
TABLET ORAL
Qty: 90 TABLET | Refills: 3 | Status: SHIPPED | OUTPATIENT
Start: 2024-11-25

## 2024-12-09 ENCOUNTER — OFFICE VISIT (OUTPATIENT)
Dept: PRIMARY CARE CLINIC | Facility: CLINIC | Age: 59
End: 2024-12-09
Payer: COMMERCIAL

## 2024-12-09 VITALS
HEIGHT: 59 IN | BODY MASS INDEX: 35.07 KG/M2 | SYSTOLIC BLOOD PRESSURE: 114 MMHG | HEART RATE: 113 BPM | DIASTOLIC BLOOD PRESSURE: 70 MMHG | WEIGHT: 173.94 LBS | OXYGEN SATURATION: 94 %

## 2024-12-09 DIAGNOSIS — F51.01 PRIMARY INSOMNIA: ICD-10-CM

## 2024-12-09 DIAGNOSIS — M81.0 AGE-RELATED OSTEOPOROSIS WITHOUT CURRENT PATHOLOGICAL FRACTURE: ICD-10-CM

## 2024-12-09 DIAGNOSIS — Z12.12 ENCOUNTER FOR COLORECTAL CANCER SCREENING: ICD-10-CM

## 2024-12-09 DIAGNOSIS — I10 ESSENTIAL HYPERTENSION: ICD-10-CM

## 2024-12-09 DIAGNOSIS — Z12.11 ENCOUNTER FOR COLORECTAL CANCER SCREENING: ICD-10-CM

## 2024-12-09 DIAGNOSIS — Z00.00 ANNUAL PHYSICAL EXAM: Primary | ICD-10-CM

## 2024-12-09 DIAGNOSIS — F41.9 ANXIETY: ICD-10-CM

## 2024-12-09 DIAGNOSIS — E78.2 MIXED HYPERLIPIDEMIA: ICD-10-CM

## 2024-12-09 DIAGNOSIS — E66.01 SEVERE OBESITY (BMI 35.0-39.9) WITH COMORBIDITY: ICD-10-CM

## 2024-12-09 DIAGNOSIS — Z85.3 HISTORY OF BREAST CANCER: ICD-10-CM

## 2024-12-09 PROCEDURE — 3008F BODY MASS INDEX DOCD: CPT | Mod: CPTII,S$GLB,, | Performed by: INTERNAL MEDICINE

## 2024-12-09 PROCEDURE — 1159F MED LIST DOCD IN RCRD: CPT | Mod: CPTII,S$GLB,, | Performed by: INTERNAL MEDICINE

## 2024-12-09 PROCEDURE — 3078F DIAST BP <80 MM HG: CPT | Mod: CPTII,S$GLB,, | Performed by: INTERNAL MEDICINE

## 2024-12-09 PROCEDURE — 99396 PREV VISIT EST AGE 40-64: CPT | Mod: S$GLB,,, | Performed by: INTERNAL MEDICINE

## 2024-12-09 PROCEDURE — 99999 PR PBB SHADOW E&M-EST. PATIENT-LVL IV: CPT | Mod: PBBFAC,,, | Performed by: INTERNAL MEDICINE

## 2024-12-09 PROCEDURE — 3074F SYST BP LT 130 MM HG: CPT | Mod: CPTII,S$GLB,, | Performed by: INTERNAL MEDICINE

## 2024-12-09 NOTE — PROGRESS NOTES
[facility] Note    Chief Complaint      Chief Complaint   Patient presents with    Annual Exam       History of Present Illness      History of Present Illness    CHIEF COMPLAINT:  Ms. Espinal presents today for follow up.    SINUS ISSUES:  She reports experiencing sinus problems with thick mucus causing breathing difficulties. The mucus gets caught, making it hard to breathe and expel. She has an upcoming appointment with a throat specialist to address these concerns.    HIP PAIN:  She reports improvement in hip pain with pillow use, though symptoms are not completely resolved. X-rays showed no evidence of arthritis.    PREVENTIVE CARE:  She is up to date on preventive care measures. Bone density scan is due in 2025. She is current on tetanus vaccination. She received the flu vaccine in September and has completed the shingles vaccine series. She is due for a FIT kit for colon cancer screening.    SOCIAL HISTORY:  She reports having caregiver responsibilities that require adjustments.      ROS:  Respiratory: +difficulty breathing  Musculoskeletal: +joint pain       HTN: BP at goal on amlodipine, olmesartan-hydrochlorothiazide    HLD: Controlled off atorvastatin for the past few years.   last check.  Hx of breast cancer: Sees oncology, Dr. Olivier.  Off anastrazole 2024.  Myalgias improcving.  Asthma: Has albuterol as needed.  Has not needed rescue since last visit.  Anxiety: Controlled off effexor.  No new or worsening symptoms.    Insomnia: Partially controlled on trazodone.  Doing well on this regimen.     Osteopenia: FRAX does not meet criteria for treatment.  Repeat in 2025.  Flu shot UTD.  TdAP 2017.  Shingles vaccine 1/2.  Pneumonia vaccine due age 64.  COVID vaccine UTD.  Mammogram N/A bilateral mastectomy.  DEXA 2023, osteopenia Dr. Boswell.  FIT 12/2023.     Assessment/Plan     Natasha Espinal is a 59 y.o.female with:    Assessment & Plan    Considered steroid injection  for hip pain management, but deferred due to current symptom improvement with conservative measures  Reviewed previous x-ray results, confirming absence of arthritis; likely inflammation, tendonitis, or bursitis  Assessed need for bone density scan, determining next due date as 2025  Evaluated vaccination status, noting patient is up to date on tetanus and has received initial shingles vaccine  Will continue with fit kit for colon cancer screening in lieu of colonoscopy    COLON CANCER SCREENING:  - Ordered fit kit for colon cancer screening, to be completed after December 12th.  - Follow up after completing fit kit for colon cancer screening after December 12th.    HIP PAIN (TROCHANTERIC BURSITIS):  - Advised patient to call for referral to orthopedist if hip pain worsens, for potential steroid injection.  - Contact the office for orthopedist referral if hip pain worsens.         1. Annual physical exam    2. Essential hypertension    3. Mixed hyperlipidemia    4. History of breast cancer    5. Anxiety    6. Primary insomnia    7. Age-related osteoporosis without current pathological fracture    8. Encounter for colorectal cancer screening  - Fecal Immunochemical Test (iFOBT); Future      Chronic conditions status updated as per HPI.  Other than changes above, cont current medications and maintain follow up with specialists.  No follow-ups on file.    Future Appointments   Date Time Provider Department Center   12/16/2024 10:00 AM Steffany Fermin NP [facility] [address]   6/9/2025  9:45 AM Ta Espinoza MD [facility]           Past Medical History:  Past Medical History:   Diagnosis Date    Allergy     Asthma     Breast cancer 12/2017    left     Hypertension        Past Surgical History:   has a past surgical history that includes Hysterectomy (2011); Oophorectomy; deviated septum repair; Mastectomy (Bilateral, 02/2018); Breast biopsy (Left, 12/2017); Breast biopsy (Left, 1983); Breast  reconstruction (Bilateral); scar tissue removal in airway  (Bilateral, 08/01/2018); Knee surgery (Left, 01/06/2020); Sinus surgery; Direct laryngobronchoscopy (N/A, 02/17/2022); Dilation of trachea (02/17/2022); Injection of steroid (02/17/2022); and Cosmetic surgery (2018).    Family History:  family history includes Arthritis in her father and mother; Asthma in her brother and mother; Breast cancer (age of onset: 43) in her paternal aunt; Breast cancer (age of onset: 55) in her paternal aunt; Breast cancer (age of onset: 62) in her paternal aunt; COPD in her father; Colon cancer in her paternal uncle; Hearing loss in her father and mother; Heart disease in her father.     Social History:  Social History     Tobacco Use    Smoking status: Never    Smokeless tobacco: Never   Substance Use Topics    Alcohol use: Not Currently    Drug use: Never       Medications:  Outpatient Encounter Medications as of 12/9/2024   Medication Sig Dispense Refill    amLODIPine (NORVASC) 10 MG tablet TAKE 1 TABLET BY MOUTH EVERY DAY IN THE EVENING 90 tablet 3    ascorbate calcium 500 mg Tab Take 1 tablet by mouth.      aspirin (ECOTRIN) 81 MG EC tablet Take 81 mg by mouth once daily.      cetirizine (ZYRTEC) 10 MG tablet Take 10 mg by mouth once daily.      cholecalciferol, vitamin D3, (VITAMIN D3) 50 mcg (2,000 unit) Cap Take 1 capsule by mouth once daily.      fish oil-omega-3 fatty acids 300-1,000 mg capsule Take 1 g by mouth.      fluticasone propionate (FLONASE) 50 mcg/actuation nasal spray 1 spray (50 mcg total) by Each Nostril route once daily. 11.1 mL 2    hydroCHLOROthiazide (HYDRODIURIL) 12.5 MG Tab TAKE 1 TABLET BY MOUTH EVERY DAY 90 tablet 3    hydrOXYzine HCL (ATARAX) 25 MG tablet Take 1 tablet (25 mg total) by mouth 3 (three) times daily as needed for Itching. 60 tablet 1    melatonin 5 mg Tab Take by mouth.      multivitamin/iron/folic acid (CENTRUM WOMEN ORAL) Take by mouth.      olmesartan-hydrochlorothiazide (BENICAR  "HCT) 40-12.5 mg Tab TAKE 1 TABLET BY MOUTH EVERY DAY 90 tablet 0    pantoprazole (PROTONIX) 40 MG tablet TAKE 1 TABLET BY MOUTH EVERY DAY 90 tablet 3    traZODone (DESYREL) 50 MG tablet TAKE 1 TABLET BY MOUTH NIGHTLY AS NEEDED FOR INSOMNIA. 90 tablet 3    [DISCONTINUED] hydroCHLOROthiazide (HYDRODIURIL) 12.5 MG Tab TAKE 1 TABLET BY MOUTH EVERY DAY 90 tablet 2     No facility-administered encounter medications on file as of 12/9/2024.       Allergies:  Review of patient's allergies indicates:   Allergen Reactions    Naproxen Itching    Sulfa (sulfonamide antibiotics) Nausea Only       Health Maintenance:  Immunization History   Administered Date(s) Administered    COVID-19, MRNA, LN-S, PF (Pfizer) (Purple Cap) 02/25/2021, 03/18/2021, 11/09/2021    Influenza (FLUBLOK) - Quadrivalent - Recombinant - PF *Preferred* (egg allergy) 10/05/2020    Influenza - Quadrivalent 11/24/2014    Influenza - Quadrivalent - MDCK - PF 10/05/2022, 09/27/2023    Influenza - Quadrivalent - PF *Preferred* (6 months and older) 08/28/2019, 01/22/2020, 10/22/2021    Influenza - Trivalent - Fluarix, Flulaval, Fluzone, Afluria - PF 09/25/2024    Tdap 07/31/2017    Zoster Recombinant 07/29/2024      Health Maintenance   Topic Date Due    Pneumococcal Vaccines (Age 0-64) (1 of 2 - PCV) Never done    COVID-19 Vaccine (4 - 2024-25 season) 09/01/2024    Shingles Vaccine (2 of 2) 09/23/2024    RSV Vaccine (Age 60+ and Pregnant patients) (1 - Risk 60-74 years 1-dose series) Never done    DEXA Scan  11/10/2025    Hemoglobin A1c (Diabetic Prevention Screening)  11/08/2026    TETANUS VACCINE  07/31/2027    Colorectal Cancer Screening  01/26/2028    Lipid Panel  12/04/2029    Hepatitis C Screening  Completed    Influenza Vaccine  Completed    HIV Screening  Completed        Physical Exam      Vital Signs  Pulse: (!) 113  SpO2: (!) 94 %  BP: 114/70  BP Location: Left arm  Patient Position: Sitting  Pain Score: 0-No pain  Height and Weight  Height: 4' 11" " (149.9 cm)  Weight: 78.9 kg (173 lb 15.1 oz)  BSA (Calculated - sq m): 1.81 sq meters  BMI (Calculated): 35.1  Weight in (lb) to have BMI = 25: 123.5]    Physical Exam    General: No acute distress. Well-developed. Well-nourished.  Eyes: EOMI. Sclerae anicteric.  HENT: Normocephalic. Atraumatic. Nares patent. Moist oral mucosa.  Ears: Bilateral TMs clear. Bilateral EACs clear.  Cardiovascular: Regular rate. Regular rhythm. No murmurs. No rubs. No gallops. Normal S1, S2.  Respiratory: Normal respiratory effort. Clear to auscultation bilaterally. No rales. No rhonchi. No wheezing.  Abdomen: Soft. Non-tender. Non-distended. Normoactive bowel sounds.  Musculoskeletal: No  obvious deformity.  Extremities: No lower extremity edema.  Neurological: Alert & oriented x3. No slurred speech. Normal gait.  Psychiatric: Normal mood. Normal affect. Good insight. Good judgment.  Skin: Warm. Dry. No rash.         Physical Exam    Laboratory:    Results              CBC:  Recent Labs   Lab 10/31/22  0857 11/08/23  0914 12/04/24  0720   WBC 6.60 8.03 8.79   RBC 4.82 4.72 4.74   Hemoglobin 13.6 13.5 13.6   Hematocrit 42.1 41.2 41.3   Platelets 266 322 280   MCV 87 87 87   MCH 28.2 28.6 28.7   MCHC 32.3 32.8 32.9     CMP:  Recent Labs   Lab 10/31/22  0856 11/08/23  0914 12/04/24  0720   Glucose 112 H 101 106   Calcium 9.6 9.8 10.1   Albumin 4.6 4.3 3.9   Total Protein 7.9 7.5 7.6   Sodium 145 141 144   Potassium 4.0 4.5 3.9   CO2 32 H 28 30 H   Chloride 102 103 104   BUN 19 H 16 19   Alkaline Phosphatase 76 89 72   ALT 50 H 47 H 37   AST 30 37 20   Total Bilirubin 0.3 0.5 0.4     URINALYSIS:       LIPIDS:  Recent Labs   Lab 10/31/22  0856 11/08/23  0914 12/04/24  0720   TSH 1.170 1.070 1.340   HDL 37 L 36 L 47   Cholesterol 195 188 225 H   Triglycerides 257 H 115 231 H   LDL Cholesterol 106.6 129.0 131.8   HDL/Cholesterol Ratio 19.0 L 19.1 L 20.9   Non-HDL Cholesterol 158 152 178   Total Cholesterol/HDL Ratio 5.3 H 5.2 H 4.8      TSH:  Recent Labs   Lab 10/31/22  0856 11/08/23  0914 12/04/24  0720   TSH 1.170 1.070 1.340     A1C:  Recent Labs   Lab 11/08/23  0914   Hemoglobin A1C 5.3           This note was generated with the assistance of ambient listening technology. Verbal consent was obtained by the patient and accompanying visitor(s) for the recording of patient appointment to facilitate this note. I attest to having reviewed and edited the generated note for accuracy, though some syntax or spelling errors may persist. Please contact the author of this note for any clarification.      Ta Espinoza MD  [facility]

## 2024-12-16 ENCOUNTER — OFFICE VISIT (OUTPATIENT)
Dept: OTOLARYNGOLOGY | Facility: CLINIC | Age: 59
End: 2024-12-16
Payer: COMMERCIAL

## 2024-12-16 VITALS — DIASTOLIC BLOOD PRESSURE: 85 MMHG | HEART RATE: 96 BPM | SYSTOLIC BLOOD PRESSURE: 127 MMHG

## 2024-12-16 DIAGNOSIS — J39.8 TRACHEAL STENOSIS: Primary | ICD-10-CM

## 2024-12-16 PROCEDURE — 3079F DIAST BP 80-89 MM HG: CPT | Mod: CPTII,S$GLB,, | Performed by: NURSE PRACTITIONER

## 2024-12-16 PROCEDURE — 99999 PR PBB SHADOW E&M-EST. PATIENT-LVL III: CPT | Mod: PBBFAC,,, | Performed by: NURSE PRACTITIONER

## 2024-12-16 PROCEDURE — 1159F MED LIST DOCD IN RCRD: CPT | Mod: CPTII,S$GLB,, | Performed by: NURSE PRACTITIONER

## 2024-12-16 PROCEDURE — 99213 OFFICE O/P EST LOW 20 MIN: CPT | Mod: 25,S$GLB,, | Performed by: NURSE PRACTITIONER

## 2024-12-16 PROCEDURE — 1160F RVW MEDS BY RX/DR IN RCRD: CPT | Mod: CPTII,S$GLB,, | Performed by: NURSE PRACTITIONER

## 2024-12-16 PROCEDURE — 31575 DIAGNOSTIC LARYNGOSCOPY: CPT | Mod: S$GLB,,, | Performed by: NURSE PRACTITIONER

## 2024-12-16 PROCEDURE — 3074F SYST BP LT 130 MM HG: CPT | Mod: CPTII,S$GLB,, | Performed by: NURSE PRACTITIONER

## 2024-12-16 RX ORDER — METHYLPREDNISOLONE 4 MG/1
TABLET ORAL
Qty: 21 EACH | Refills: 0 | Status: SHIPPED | OUTPATIENT
Start: 2024-12-16 | End: 2025-01-06

## 2024-12-16 RX ORDER — DOXYCYCLINE 100 MG/1
100 CAPSULE ORAL 2 TIMES DAILY
Qty: 20 CAPSULE | Refills: 0 | Status: SHIPPED | OUTPATIENT
Start: 2024-12-16 | End: 2024-12-26

## 2024-12-16 NOTE — PROGRESS NOTES
"[facility]  Department of Otorhinolaryngology and Communication Sciences    Natasha Espinal is a 59 y.o. female who returns for a follow up visit. Her breathing has been normal since surgery. Several weeks ago she developed cough, congestion, and post nasal drainage. She feels like she is choking on the mucus which makes her feel short of breath. She has tried taking Mucinex and Flonase with mild relief. No other complaints.    She initially presented to the [facility] for consultation at the kind request of Dr. Porter Castaneda for further management of tracheal stenosis.    She complained of mild dyspnea and wheezing.  She has tracheal stenosis, ultimately identified in May 2018 when she was found to be a very difficult intubation for breast reconstruction surgery. She was connected to Dr. Ridley, who brought her for endoscopic airway intervention in Sept 2018. She had done well since then but was lost to follow up due to high out of pocket costs with her insurance plan.     3 years ago she noticed exertional dyspnea an noisy breathing when [place]. Since then she has been aware of mild exertional dyspnea, phonatory dyspnea, and mild inspiratory airway noise. Symptoms are stable and not deteriorating. Symptoms are nonresponsive to bronchodilator.    She had PFTs with pulm recently  FVL 11/29/21  Truncation and flattening of expiratory flow volume loop; some truncation of inspiratory loop  "No convincing evidence of reactive airway disease. No bronchodilator response. PFT not consistent with asthma."    Has a prior longstanding history of bad sinus problems, needed sinus surgery  No personal or family history of rheumatologic disorders.    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          1/25/2022     5:34 PM   Dyspnea Index Score   Dyspnea Index Total Score  20          No data to display                Past Medical History  She has a past medical history of Allergy, " Asthma, Breast cancer, and Hypertension.    Past Surgical History  She has a past surgical history that includes Hysterectomy (2011); Oophorectomy; deviated septum repair; Mastectomy (Bilateral, 02/2018); Breast biopsy (Left, 12/2017); Breast biopsy (Left, 1983); Breast reconstruction (Bilateral); scar tissue removal in airway  (Bilateral, 08/01/2018); Knee surgery (Left, 01/06/2020); Sinus surgery; Direct laryngobronchoscopy (N/A, 02/17/2022); Dilation of trachea (02/17/2022); Injection of steroid (02/17/2022); and Cosmetic surgery (2018).    Family History  Her family history includes Arthritis in her father and mother; Asthma in her brother and mother; Breast cancer (age of onset: 43) in her paternal aunt; Breast cancer (age of onset: 55) in her paternal aunt; Breast cancer (age of onset: 62) in her paternal aunt; COPD in her father; Colon cancer in her paternal uncle; Hearing loss in her father and mother; Heart disease in her father.    Social History  She reports that she has never smoked. She has never used smokeless tobacco. She reports that she does not currently use alcohol. She reports that she does not use drugs.    Allergies  She is allergic to naproxen and sulfa (sulfonamide antibiotics).    Medications  She has a current medication list which includes the following prescription(s): amlodipine, ascorbate calcium (vitamin c), aspirin, cetirizine, cholecalciferol (vitamin d3), doxycycline, fish oil-omega-3 fatty acids, fluticasone propionate, hydrochlorothiazide, hydroxyzine hcl, melatonin, methylprednisolone, multivitamin/iron/folic acid, olmesartan-hydrochlorothiazide, pantoprazole, and trazodone.    Review of Systems   Constitutional: Negative.  Negative for fever.   HENT: Negative.  Negative for mouth sores, postnasal drip, sinus pressure, sore throat, trouble swallowing and voice change.    Eyes: Negative.  Negative for visual disturbance.   Respiratory: Negative.  Negative for cough, shortness of  breath and wheezing.    Cardiovascular: Negative.  Negative for chest pain.   Gastrointestinal: Negative.  Negative for nausea.   Endocrine: Negative.    Genitourinary: Negative.    Musculoskeletal: Negative.  Negative for arthralgias.   Skin: Negative.  Negative for rash.   Allergic/Immunologic: Negative.    Neurological: Negative.  Negative for tremors.   Hematological: Negative.  Does not bruise/bleed easily.   Psychiatric/Behavioral: Negative.  The patient is not nervous/anxious.           Objective:     /85 (BP Location: Right arm, Patient Position: Sitting)   Pulse 96      Physical Exam  Constitutional: comfortable, well dressed  Psychiatric: appropriate affect  Respiratory: comfortably breathing, symmetric chest rise, no stridor  Voice: faint inconsistent inspiratory stridor  Cardiovascular: upper extremities non-edematous  Lymphatic: no cervical lymphadenopathy  Neurologic: alert and oriented to time, place, person, and situation; cranial nerves 3-12 grossly intact  Head: normocephalic  Eyes: conjunctivae and sclerae clear  Ears: normal pinnae, normal external auditory canals, tympanic membranes intact  Nose: mucosa pink and congested, thick yellow mucus in nasopharynx   no masses, no mucopurulence, no polyps  Oral cavity / oropharynx: no mucosal lesions  Neck: soft, full range of motion, laryngotracheal complex palpable with appropriate landmarks, larynx elevates on swallowing  Indirect laryngoscopy: limited due to gag    Procedure  Flexible Laryngoscopy (99688): Laryngoscopy is indicated for assessment of upper aerodigestive structure and function. This was carried out transnasally with a distal chip videoendoscope. After verbal consent was obtained, the patient was positioned and the nose was topically decongested with 1% phenylephrine and topically anesthetized with 4% lidocaine. The endoscope was passed through the most patent nasal cavity and positioned to image the nasopharynx, larynx, and  hypopharynx in detail. The following features were examined: nasopharyngeal, laryngeal, hypopharyngeal masses; velopharyngeal strength, closure, and symmetry of motion; vocal fold range and symmetry of motion; laryngeal mucosal edema, erythema, inflammation, and hydration; salivary pooling; and gross laryngeal sensation. The equipment was removed. The patient tolerated the procedure well without complication. All findings were normal except:  - significant improvement in airway, stable compared to previous exam    Assessment:     Natasha Espinal is a 59 y.o. female with chronic tracheal stenosis, suspected to be idiopathic. Now 3 years s/p suspension microlaryngoscopy, CO2 laser excision, steroid injection, balloon dilation.         Plan:        Problem List Items Addressed This Visit          Pulmonary    Tracheal stenosis - Primary     Exam stable, no evidence of stenosis. Symptoms likely related to URI. Medrol dose pack and doxycyline prescribed. Follow up in 6 weeks, sooner if needed.

## 2024-12-16 NOTE — ASSESSMENT & PLAN NOTE
Exam stable, no evidence of stenosis. Symptoms likely related to URI. Medrol dose pack and doxycyline prescribed. Follow up in 6 weeks, sooner if needed.

## 2024-12-26 ENCOUNTER — LAB VISIT (OUTPATIENT)
Dept: LAB | Facility: HOSPITAL | Age: 59
End: 2024-12-26
Attending: INTERNAL MEDICINE
Payer: COMMERCIAL

## 2024-12-26 DIAGNOSIS — Z12.11 ENCOUNTER FOR COLORECTAL CANCER SCREENING: ICD-10-CM

## 2024-12-26 DIAGNOSIS — Z12.12 ENCOUNTER FOR COLORECTAL CANCER SCREENING: ICD-10-CM

## 2024-12-26 LAB — HEMOCCULT STL QL IA: NEGATIVE

## 2024-12-26 PROCEDURE — 82274 ASSAY TEST FOR BLOOD FECAL: CPT | Performed by: INTERNAL MEDICINE

## 2025-01-14 DIAGNOSIS — I10 ESSENTIAL HYPERTENSION: ICD-10-CM

## 2025-01-14 RX ORDER — OLMESARTAN MEDOXOMIL AND HYDROCHLOROTHIAZIDE 40/12.5 40; 12.5 MG/1; MG/1
TABLET ORAL
Qty: 90 TABLET | Refills: 3 | Status: SHIPPED | OUTPATIENT
Start: 2025-01-14

## 2025-01-14 NOTE — TELEPHONE ENCOUNTER
No care due was identified.  [facility] Embedded Care Due Messages. Reference number: [id number].   1/14/2025 12:33:05 PM CST

## 2025-01-14 NOTE — TELEPHONE ENCOUNTER
Refill Decision Note   Natasha Espinal  is requesting a refill authorization.  Brief Assessment and Rationale for Refill:  Approve     Medication Therapy Plan:  Pt takes both benicar-hct 40-12.5 mg and hctz12.5 mg per adherence      Alert overridden per protocol: Yes   Comments:     Note composed:12:46 PM 01/14/2025             Appointments     Last Visit   12/9/2024 Ta Espinoza MD   Next Visit   6/9/2025 Ta Espinoza MD

## 2025-01-16 ENCOUNTER — OFFICE VISIT (OUTPATIENT)
Dept: OTOLARYNGOLOGY | Facility: CLINIC | Age: 60
End: 2025-01-16
Payer: COMMERCIAL

## 2025-01-16 VITALS — SYSTOLIC BLOOD PRESSURE: 117 MMHG | DIASTOLIC BLOOD PRESSURE: 83 MMHG | HEART RATE: 94 BPM

## 2025-01-16 DIAGNOSIS — J39.8 TRACHEAL STENOSIS: Primary | ICD-10-CM

## 2025-01-16 PROCEDURE — 31575 DIAGNOSTIC LARYNGOSCOPY: CPT | Mod: S$GLB,,, | Performed by: NURSE PRACTITIONER

## 2025-01-16 PROCEDURE — 99213 OFFICE O/P EST LOW 20 MIN: CPT | Mod: 25,S$GLB,, | Performed by: NURSE PRACTITIONER

## 2025-01-16 PROCEDURE — 3079F DIAST BP 80-89 MM HG: CPT | Mod: CPTII,S$GLB,, | Performed by: NURSE PRACTITIONER

## 2025-01-16 PROCEDURE — 1159F MED LIST DOCD IN RCRD: CPT | Mod: CPTII,S$GLB,, | Performed by: NURSE PRACTITIONER

## 2025-01-16 PROCEDURE — 1160F RVW MEDS BY RX/DR IN RCRD: CPT | Mod: CPTII,S$GLB,, | Performed by: NURSE PRACTITIONER

## 2025-01-16 PROCEDURE — 99999 PR PBB SHADOW E&M-EST. PATIENT-LVL III: CPT | Mod: PBBFAC,,, | Performed by: NURSE PRACTITIONER

## 2025-01-16 PROCEDURE — 3074F SYST BP LT 130 MM HG: CPT | Mod: CPTII,S$GLB,, | Performed by: NURSE PRACTITIONER

## 2025-01-16 NOTE — PROGRESS NOTES
"[facility]  Department of Otorhinolaryngology and Communication Sciences    Natasha Espinal is a 59 y.o. female who returns for a follow up visit. Her breathing has improved since steroids. She feels good overall and is back to her baseline. No other complaints.    She initially presented to the [facility] for consultation at the kind request of Dr. Porter Castaneda for further management of tracheal stenosis.    She complained of mild dyspnea and wheezing.  She has tracheal stenosis, ultimately identified in May 2018 when she was found to be a very difficult intubation for breast reconstruction surgery. She was connected to Dr. Ridley, who brought her for endoscopic airway intervention in Sept 2018. She had done well since then but was lost to follow up due to high out of pocket costs with her insurance plan.     3 years ago she noticed exertional dyspnea an noisy breathing when [place]. Since then she has been aware of mild exertional dyspnea, phonatory dyspnea, and mild inspiratory airway noise. Symptoms are stable and not deteriorating. Symptoms are nonresponsive to bronchodilator.    She had PFTs with pulm recently  [facility] 11/29/21  Truncation and flattening of expiratory flow volume loop; some truncation of inspiratory loop  "No convincing evidence of reactive airway disease. No bronchodilator response. PFT not consistent with asthma."    Has a prior longstanding history of bad sinus problems, needed sinus surgery  No personal or family history of rheumatologic disorders.    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          1/25/2022     5:34 PM   Dyspnea Index Score   Dyspnea Index Total Score  20          No data to display                Past Medical History  She has a past medical history of Allergy, Asthma, Breast cancer, and Hypertension.    Past Surgical History  She has a past surgical history that includes Hysterectomy (2011); Oophorectomy; deviated septum repair; " Mastectomy (Bilateral, 02/2018); Breast biopsy (Left, 12/2017); Breast biopsy (Left, 1983); Breast reconstruction (Bilateral); scar tissue removal in airway  (Bilateral, 08/01/2018); Knee surgery (Left, 01/06/2020); Sinus surgery; Direct laryngobronchoscopy (N/A, 02/17/2022); Dilation of trachea (02/17/2022); Injection of steroid (02/17/2022); and Cosmetic surgery (2018).    Family History  Her family history includes Arthritis in her father and mother; Asthma in her brother and mother; Breast cancer (age of onset: 43) in her paternal aunt; Breast cancer (age of onset: 55) in her paternal aunt; Breast cancer (age of onset: 62) in her paternal aunt; COPD in her father; Colon cancer in her paternal uncle; Hearing loss in her father and mother; Heart disease in her father.    Social History  She reports that she has never smoked. She has never used smokeless tobacco. She reports that she does not currently use alcohol. She reports that she does not use drugs.    Allergies  She is allergic to naproxen and sulfa (sulfonamide antibiotics).    Medications  She has a current medication list which includes the following prescription(s): amlodipine, ascorbate calcium (vitamin c), aspirin, cetirizine, cholecalciferol (vitamin d3), fish oil-omega-3 fatty acids, fluticasone propionate, hydrochlorothiazide, hydroxyzine hcl, melatonin, multivitamin/iron/folic acid, olmesartan-hydrochlorothiazide, pantoprazole, and trazodone.    Review of Systems   Constitutional: Negative.  Negative for fever.   HENT: Negative.  Negative for mouth sores, postnasal drip, sinus pressure, sore throat, trouble swallowing and voice change.    Eyes: Negative.  Negative for visual disturbance.   Respiratory: Negative.  Negative for cough, shortness of breath and wheezing.    Cardiovascular: Negative.  Negative for chest pain.   Gastrointestinal: Negative.  Negative for nausea.   Endocrine: Negative.    Genitourinary: Negative.    Musculoskeletal:  Negative.  Negative for arthralgias.   Skin: Negative.  Negative for rash.   Allergic/Immunologic: Negative.    Neurological: Negative.  Negative for tremors.   Hematological: Negative.  Does not bruise/bleed easily.   Psychiatric/Behavioral: Negative.  The patient is not nervous/anxious.           Objective:     /83 (BP Location: Right arm, Patient Position: Sitting)   Pulse 94      Physical Exam  Constitutional: comfortable, well dressed  Psychiatric: appropriate affect  Respiratory: comfortably breathing, symmetric chest rise, no stridor  Voice: faint inconsistent inspiratory stridor  Cardiovascular: upper extremities non-edematous  Lymphatic: no cervical lymphadenopathy  Neurologic: alert and oriented to time, place, person, and situation; cranial nerves 3-12 grossly intact  Head: normocephalic  Eyes: conjunctivae and sclerae clear  Ears: normal pinnae, normal external auditory canals, tympanic membranes intact  Nose: mucosa pink and congested, thick yellow mucus in nasopharynx   no masses, no mucopurulence, no polyps  Oral cavity / oropharynx: no mucosal lesions  Neck: soft, full range of motion, laryngotracheal complex palpable with appropriate landmarks, larynx elevates on swallowing  Indirect laryngoscopy: limited due to gag    Procedure  Flexible Laryngoscopy (24945): Laryngoscopy is indicated for assessment of upper aerodigestive structure and function. This was carried out transnasally with a distal chip videoendoscope. After verbal consent was obtained, the patient was positioned and the nose was topically decongested with 1% phenylephrine and topically anesthetized with 4% lidocaine. The endoscope was passed through the most patent nasal cavity and positioned to image the nasopharynx, larynx, and hypopharynx in detail. The following features were examined: nasopharyngeal, laryngeal, hypopharyngeal masses; velopharyngeal strength, closure, and symmetry of motion; vocal fold range and symmetry of  motion; laryngeal mucosal edema, erythema, inflammation, and hydration; salivary pooling; and gross laryngeal sensation. The equipment was removed. The patient tolerated the procedure well without complication. All findings were normal except:  - significant improvement in airway, stable compared to previous exam    Assessment:     Natasha Espinal is a 59 y.o. female with chronic tracheal stenosis, suspected to be idiopathic. Now 3 years s/p suspension microlaryngoscopy, CO2 laser excision, steroid injection, balloon dilation.         Plan:        Problem List Items Addressed This Visit          Pulmonary    Tracheal stenosis - Primary     Exam stable. Questions answered. RTC in 4 months, sooner if needed.

## 2025-01-17 DIAGNOSIS — I10 ESSENTIAL HYPERTENSION: ICD-10-CM

## 2025-01-17 DIAGNOSIS — F51.01 PRIMARY INSOMNIA: ICD-10-CM

## 2025-01-17 RX ORDER — AMLODIPINE BESYLATE 10 MG/1
TABLET ORAL
Qty: 90 TABLET | Refills: 3 | Status: SHIPPED | OUTPATIENT
Start: 2025-01-17

## 2025-01-17 RX ORDER — TRAZODONE HYDROCHLORIDE 50 MG/1
TABLET ORAL
Qty: 90 TABLET | Refills: 3 | Status: SHIPPED | OUTPATIENT
Start: 2025-01-17

## 2025-01-17 NOTE — TELEPHONE ENCOUNTER
Refill Decision Note   Natasha Downingana maria  is requesting a refill authorization.  Brief Assessment and Rationale for Refill:  Approve     Medication Therapy Plan:         Comments:     Note composed:6:52 AM 01/17/2025

## 2025-01-17 NOTE — TELEPHONE ENCOUNTER
Unable to retrieve patient chart and identify care due.  [facility] Embedded Care Due Messages. Reference number: [id number].   1/17/2025 12:15:39 AM CST

## 2025-03-12 ENCOUNTER — PATIENT MESSAGE (OUTPATIENT)
Dept: PRIMARY CARE CLINIC | Facility: CLINIC | Age: 60
End: 2025-03-12
Payer: COMMERCIAL

## 2025-03-17 ENCOUNTER — OFFICE VISIT (OUTPATIENT)
Dept: UROLOGY | Facility: CLINIC | Age: 60
End: 2025-03-17
Payer: COMMERCIAL

## 2025-03-17 VITALS — BODY MASS INDEX: 34.95 KG/M2 | HEIGHT: 59 IN | WEIGHT: 173.38 LBS

## 2025-03-17 DIAGNOSIS — N20.0 NEPHROLITHIASIS: ICD-10-CM

## 2025-03-17 DIAGNOSIS — N22 CALCULUS OF URINARY TRACT IN DISEASES CLASSIFIED ELSEWHERE: ICD-10-CM

## 2025-03-17 DIAGNOSIS — R31.9 HEMATURIA, UNSPECIFIED TYPE: Primary | ICD-10-CM

## 2025-03-17 PROCEDURE — 1159F MED LIST DOCD IN RCRD: CPT | Mod: CPTII,S$GLB,, | Performed by: UROLOGY

## 2025-03-17 PROCEDURE — 1160F RVW MEDS BY RX/DR IN RCRD: CPT | Mod: CPTII,S$GLB,, | Performed by: UROLOGY

## 2025-03-17 PROCEDURE — 81003 URINALYSIS AUTO W/O SCOPE: CPT

## 2025-03-17 PROCEDURE — 3008F BODY MASS INDEX DOCD: CPT | Mod: CPTII,S$GLB,, | Performed by: UROLOGY

## 2025-03-17 PROCEDURE — 87086 URINE CULTURE/COLONY COUNT: CPT

## 2025-03-17 PROCEDURE — 99204 OFFICE O/P NEW MOD 45 MIN: CPT | Mod: S$GLB,,, | Performed by: UROLOGY

## 2025-03-17 PROCEDURE — 99999 PR PBB SHADOW E&M-EST. PATIENT-LVL IV: CPT | Mod: PBBFAC,,, | Performed by: UROLOGY

## 2025-03-17 RX ORDER — KETOROLAC TROMETHAMINE 10 MG/1
10 TABLET, FILM COATED ORAL EVERY 6 HOURS PRN
Qty: 20 TABLET | Refills: 0 | Status: SHIPPED | OUTPATIENT
Start: 2025-03-17 | End: 2025-03-22

## 2025-03-17 RX ORDER — TAMSULOSIN HYDROCHLORIDE 0.4 MG/1
0.4 CAPSULE ORAL DAILY
COMMUNITY
End: 2025-03-17 | Stop reason: SDUPTHER

## 2025-03-17 RX ORDER — TAMSULOSIN HYDROCHLORIDE 0.4 MG/1
0.4 CAPSULE ORAL DAILY
Qty: 30 CAPSULE | Refills: 1 | Status: SHIPPED | OUTPATIENT
Start: 2025-03-17

## 2025-03-17 NOTE — PROGRESS NOTES
"[facility]   Clinic Note    SUBJECTIVE:     No chief complaint on file.      Referral from: Self, Leoal.    History of Present Illness:  Natasha Espinal is a 59 y.o. female who presents to clinic for evaluation for possible kidney stones.  States she had a "kidney stone attack" while in [place].  States that she had severe left flank pain and urinary frequency.  States that she went to urgent care at that time which showed blood in her urine.   Then went to the ED where she got a CT scan which per patient showed a  left sided ureteral stone and a right sided kidney stone as well.  She was discharged on flomax and [place] back [place] the [place].      Had a mild episode of left flank pain a few days ago.  No gross hematuria.  Some urinary frequency, no dysuria.  Denies fevers, chills.  No prior history of stones.   Has not been straining urine.         Patient endorses no additional complaints at this time.    Past Medical History:   Diagnosis Date    Allergy     Asthma     Breast cancer 12/2017    left     Hypertension        Past Surgical History:   Procedure Laterality Date    BREAST BIOPSY Left 12/2017    BREAST BIOPSY Left 1983    exc bx    BREAST RECONSTRUCTION Bilateral     02/2018-05/2018 bryon flap    COSMETIC SURGERY  2018    Breast reconstruction    deviated septum repair      DILATION OF TRACHEA  02/17/2022    Procedure: DILATION, TRACHEA;  Surgeon: Mikey Galvez MD;  Location: [facility] OR [address] [place] [address];  Service: ENT;;    DIRECT LARYNGOBRONCHOSCOPY N/A 02/17/2022    Procedure: Suspension microlaryngoscopy, CO2 laser excision, steroid injection, balloon dilation;  Surgeon: Mikey Galvez MD;  Location: [facility] OR [address] [place] [address];  Service: ENT;  Laterality: N/A;  Microscope, telescopes, tower, airway basic, injector, Kenalog, airway balloons, CO2 laser/micromanipulator, rep conf# [id number] IC 2/3.    HYSTERECTOMY  2011    INJECTION OF STEROID  02/17/2022    Procedure: INJECTION, STEROID;  Surgeon: Mikey" "SAVANNAH Galvez MD;  Location: [facility] OR [address] [place] [address];  Service: ENT;;    KNEE SURGERY Left 01/06/2020    MASTECTOMY Bilateral 02/2018    OOPHORECTOMY      scar tissue removal in airway  Bilateral 08/01/2018    SINUS SURGERY         Family History   Problem Relation Name Age of Onset    Asthma Mother Tracey Hernadez     Arthritis Mother Tracey Hernadez     Hearing loss Mother Tracey Hernadez     Heart disease Father Flavio Hernadez     COPD Father Flavio Hernadez     Arthritis Father Flavio Hernadez     Hearing loss Father Flavio Hernadez     Breast cancer Paternal Aunt  43    Breast cancer Paternal Aunt  62    Breast cancer Paternal Aunt  55    Colon cancer Paternal Uncle      Asthma Brother Driss Jay     Ovarian cancer Neg Hx      Cancer Neg Hx         Social History[1]    Medications Ordered Prior to Encounter[2]    Review of patient's allergies indicates:   Allergen Reactions    Naproxen Itching    Sulfa (sulfonamide antibiotics) Nausea Only       Review of Systems:  A review of 10+ systems was conducted with pertinent positive and negative findings documented in HPI with all other systems reviewed and negative.    OBJECTIVE:     Estimated body mass index is 35.13 kg/m² as calculated from the following:    Height as of 12/9/24: 4' 11" (1.499 m).    Weight as of 12/9/24: 78.9 kg (173 lb 15.1 oz).    Vital Signs (Most Recent)  There were no vitals filed for this visit.    Physical Exam:  GENERAL: patient sitting comfortably  HEENT: normocephalic  NECK: supple, no JVD  PULM: normal chest rise, no increased WOB  HEART: non-diaphoretic  ABDO: soft, nondistended, nontender  BACK: no CVA tenderness bilaterally  SKIN: warm, dry, well perfused  EXT: no bruising or edema  NEURO: grossly normal with no focal deficits  PSYCH: appropriate mood and affect    Genitourinary Exam:  deferred    Lab Results   Component Value Date    BUN 19 12/04/2024    CREATININE 0.7 12/04/2024    WBC 8.79 12/04/2024    HGB 13.6 12/04/2024    HCT 41.3 12/04/2024 "     12/04/2024    AST 20 12/04/2024    ALT 37 12/04/2024    ALKPHOS 72 12/04/2024    ALBUMIN 3.9 12/04/2024    HGBA1C 5.3 11/08/2023        Imaging:      ASSESSMENT     No diagnosis found.    PLAN:     Plan for CTRSS now to evaluate for kidney stones.   Will tentatively plan for left ureteroscopy after CT results.  UA and urine culture today.  Flomax refilled and prescription for toradol sent.        Joey [facility] MD  Urology  [facility]    Disclaimer: This note has been generated using voice-recognition software. There may be typographical errors that have been missed during proof-reading.           [1]   Social History  Tobacco Use    Smoking status: Never    Smokeless tobacco: Never   Substance Use Topics    Alcohol use: Not Currently    Drug use: Never   [2]   Current Outpatient Medications on File Prior to Visit   Medication Sig Dispense Refill    amLODIPine (NORVASC) 10 MG tablet TAKE 1 TABLET BY MOUTH EVERY DAY IN THE EVENING 90 tablet 3    ascorbate calcium 500 mg Tab Take 1 tablet by mouth.      aspirin (ECOTRIN) 81 MG EC tablet Take 81 mg by mouth once daily.      cetirizine (ZYRTEC) 10 MG tablet Take 10 mg by mouth once daily.      cholecalciferol, vitamin D3, (VITAMIN D3) 50 mcg (2,000 unit) Cap Take 1 capsule by mouth once daily.      fish oil-omega-3 fatty acids 300-1,000 mg capsule Take 1 g by mouth.      fluticasone propionate (FLONASE) 50 mcg/actuation nasal spray 1 spray (50 mcg total) by Each Nostril route once daily. 11.1 mL 2    hydroCHLOROthiazide (HYDRODIURIL) 12.5 MG Tab TAKE 1 TABLET BY MOUTH EVERY DAY 90 tablet 3    hydrOXYzine HCL (ATARAX) 25 MG tablet Take 1 tablet (25 mg total) by mouth 3 (three) times daily as needed for Itching. 60 tablet 1    melatonin 5 mg Tab Take by mouth.      multivitamin/iron/folic acid (CENTRUM WOMEN ORAL) Take by mouth.      olmesartan-hydrochlorothiazide (BENICAR HCT) 40-12.5 mg Tab TAKE 1 TABLET BY MOUTH EVERY DAY 90 tablet 3     pantoprazole (PROTONIX) 40 MG tablet TAKE 1 TABLET BY MOUTH EVERY DAY 90 tablet 3    traZODone (DESYREL) 50 MG tablet TAKE 1 TABLET BY MOUTH NIGHTLY AS NEEDED FOR INSOMNIA. 90 tablet 3     No current facility-administered medications on file prior to visit.

## 2025-03-18 LAB
BACTERIA UR CULT: NORMAL
BILIRUB UR QL STRIP: NEGATIVE
CLARITY UR REFRACT.AUTO: CLEAR
COLOR UR AUTO: YELLOW
GLUCOSE UR QL STRIP: NEGATIVE
HGB UR QL STRIP: NEGATIVE
KETONES UR QL STRIP: NEGATIVE
LEUKOCYTE ESTERASE UR QL STRIP: NEGATIVE
NITRITE UR QL STRIP: NEGATIVE
PH UR STRIP: 5 [PH] (ref 5–8)
PROT UR QL STRIP: NEGATIVE
SP GR UR STRIP: 1.01 (ref 1–1.03)
URN SPEC COLLECT METH UR: NORMAL

## 2025-03-24 ENCOUNTER — OFFICE VISIT (OUTPATIENT)
Dept: UROLOGY | Facility: CLINIC | Age: 60
End: 2025-03-24
Payer: COMMERCIAL

## 2025-03-24 ENCOUNTER — PATIENT MESSAGE (OUTPATIENT)
Dept: UROLOGY | Facility: CLINIC | Age: 60
End: 2025-03-24

## 2025-03-24 DIAGNOSIS — N20.0 NEPHROLITHIASIS: Primary | ICD-10-CM

## 2025-03-24 DIAGNOSIS — N22 CALCULUS OF URINARY TRACT IN DISEASES CLASSIFIED ELSEWHERE: ICD-10-CM

## 2025-03-24 PROCEDURE — 98014 SYNCH AUDIO-ONLY EST MOD 30: CPT | Mod: 93,,, | Performed by: UROLOGY

## 2025-03-24 PROCEDURE — 1160F RVW MEDS BY RX/DR IN RCRD: CPT | Mod: CPTII,93,, | Performed by: UROLOGY

## 2025-03-24 PROCEDURE — 1159F MED LIST DOCD IN RCRD: CPT | Mod: CPTII,93,, | Performed by: UROLOGY

## 2025-03-24 NOTE — PROGRESS NOTES
"Audio Only Telehealth Visit     The patient location is: home  The chief complaint leading to consultation is: left ureteral stone, right renal stone  Visit type: Virtual visit with audio only (telephone)  Total time spent in medical discussion with patient: 15 minutes  Total time spent on date of the encounter:30 minutes       The reason for the audio only service rather than synchronous audio and video virtual visit was related to technical difficulties or patient preference/necessity.       Each patient to whom I provide medical services by telemedicine is:  (1) informed of the relationship between the physician and patient and the respective role of any other health care provider with respect to management of the patient; and (2) notified that they may decline to receive medical services by telemedicine and may withdraw from such care at any time. Patient verbally consented to receive this service via voice-only telephone call.       HPI:   Natasha Espinal is a 59 y.o. female who presents to clinic for evaluation for possible kidney stones.  States she had a "kidney stone attack" while in [place].  States that she had severe left flank pain and urinary frequency.  States that she went to urgent care at that time which showed blood in her urine.   Then went to the ED where she got a CT scan which per patient showed a  left sided ureteral stone and a right sided kidney stone as well.  She was discharged on flomax and [place]       Had a mild episode of left flank pain a few days ago.  No gross hematuria.  Some urinary frequency, no dysuria.  Denies fevers, chills.  No prior history of stones.   Has not been straining urine.         03/24/2025  Patient has had a CT scan performed.  If the patient had a left ureteral stone the recent CT scan reveals that the stone had passed.  There is no calculi present in the left side in the kidney or ureter.  No left-sided hydronephrosis.  She does have " a 4 mm right nonobstructing upper pole renal stone present.  No right-sided flank pain.  No left-sided flank pain at this time.     Assessment and plan:  Left ureteral stone, right renal stone    - discussed options.  Patient likely passed the left ureteral stone.  Reviewed the right renal stone and its management options.  Patient elects to observe.  Can follow up with me jose [facility] MD  Urology  [facility]                        This service was not originating from a related E/M service provided within the previous 7 days nor will  to an E/M service or procedure within the next 24 hours or my soonest available appointment.  Prevailing standard of care was able to be met in this audio-only visit.

## 2025-04-08 ENCOUNTER — PATIENT MESSAGE (OUTPATIENT)
Dept: OTOLARYNGOLOGY | Facility: CLINIC | Age: 60
End: 2025-04-08
Payer: COMMERCIAL

## 2025-04-11 ENCOUNTER — PATIENT MESSAGE (OUTPATIENT)
Dept: HEMATOLOGY/ONCOLOGY | Facility: CLINIC | Age: 60
End: 2025-04-11
Payer: COMMERCIAL

## 2025-05-01 ENCOUNTER — PATIENT MESSAGE (OUTPATIENT)
Dept: ADMINISTRATIVE | Facility: OTHER | Age: 60
End: 2025-05-01
Payer: COMMERCIAL

## 2025-06-03 ENCOUNTER — OFFICE VISIT (OUTPATIENT)
Dept: HEMATOLOGY/ONCOLOGY | Facility: CLINIC | Age: 60
End: 2025-06-03
Payer: COMMERCIAL

## 2025-06-03 VITALS
BODY MASS INDEX: 34.8 KG/M2 | DIASTOLIC BLOOD PRESSURE: 81 MMHG | RESPIRATION RATE: 18 BRPM | TEMPERATURE: 98 F | WEIGHT: 172.63 LBS | OXYGEN SATURATION: 94 % | HEART RATE: 97 BPM | SYSTOLIC BLOOD PRESSURE: 130 MMHG | HEIGHT: 59 IN

## 2025-06-03 DIAGNOSIS — Z17.0 CARCINOMA OF AXILLARY TAIL OF LEFT BREAST IN FEMALE, ESTROGEN RECEPTOR POSITIVE: Primary | ICD-10-CM

## 2025-06-03 DIAGNOSIS — C50.612 CARCINOMA OF AXILLARY TAIL OF LEFT BREAST IN FEMALE, ESTROGEN RECEPTOR POSITIVE: Primary | ICD-10-CM

## 2025-06-03 PROCEDURE — 99999 PR PBB SHADOW E&M-EST. PATIENT-LVL IV: CPT | Mod: PBBFAC,,, | Performed by: INTERNAL MEDICINE

## 2025-06-03 PROCEDURE — 99214 OFFICE O/P EST MOD 30 MIN: CPT | Mod: S$GLB,,, | Performed by: INTERNAL MEDICINE

## 2025-06-03 PROCEDURE — 3075F SYST BP GE 130 - 139MM HG: CPT | Mod: CPTII,S$GLB,, | Performed by: INTERNAL MEDICINE

## 2025-06-03 PROCEDURE — 3079F DIAST BP 80-89 MM HG: CPT | Mod: CPTII,S$GLB,, | Performed by: INTERNAL MEDICINE

## 2025-06-03 PROCEDURE — 3008F BODY MASS INDEX DOCD: CPT | Mod: CPTII,S$GLB,, | Performed by: INTERNAL MEDICINE

## 2025-06-03 PROCEDURE — 1159F MED LIST DOCD IN RCRD: CPT | Mod: CPTII,S$GLB,, | Performed by: INTERNAL MEDICINE

## 2025-06-09 ENCOUNTER — OFFICE VISIT (OUTPATIENT)
Dept: PRIMARY CARE CLINIC | Facility: CLINIC | Age: 60
End: 2025-06-09
Payer: COMMERCIAL

## 2025-06-09 VITALS
WEIGHT: 174.19 LBS | SYSTOLIC BLOOD PRESSURE: 118 MMHG | DIASTOLIC BLOOD PRESSURE: 70 MMHG | HEIGHT: 59 IN | HEART RATE: 91 BPM | BODY MASS INDEX: 35.12 KG/M2 | RESPIRATION RATE: 95 BRPM

## 2025-06-09 DIAGNOSIS — M25.551 CHRONIC PAIN OF BOTH HIPS: ICD-10-CM

## 2025-06-09 DIAGNOSIS — G89.29 CHRONIC PAIN OF BOTH HIPS: ICD-10-CM

## 2025-06-09 DIAGNOSIS — Z78.0 POSTMENOPAUSAL: ICD-10-CM

## 2025-06-09 DIAGNOSIS — I10 ESSENTIAL HYPERTENSION: Primary | ICD-10-CM

## 2025-06-09 DIAGNOSIS — R73.01 IMPAIRED FASTING GLUCOSE: ICD-10-CM

## 2025-06-09 DIAGNOSIS — E78.2 MIXED HYPERLIPIDEMIA: ICD-10-CM

## 2025-06-09 DIAGNOSIS — M25.552 CHRONIC PAIN OF BOTH HIPS: ICD-10-CM

## 2025-06-09 DIAGNOSIS — Z87.442 HISTORY OF KIDNEY STONES: ICD-10-CM

## 2025-06-09 DIAGNOSIS — Z85.3 HISTORY OF BREAST CANCER: ICD-10-CM

## 2025-06-09 DIAGNOSIS — F41.9 ANXIETY: ICD-10-CM

## 2025-06-09 DIAGNOSIS — M85.80 OSTEOPENIA, UNSPECIFIED LOCATION: ICD-10-CM

## 2025-06-09 DIAGNOSIS — F51.01 PRIMARY INSOMNIA: ICD-10-CM

## 2025-06-09 PROCEDURE — 3074F SYST BP LT 130 MM HG: CPT | Mod: CPTII,S$GLB,, | Performed by: INTERNAL MEDICINE

## 2025-06-09 PROCEDURE — 3008F BODY MASS INDEX DOCD: CPT | Mod: CPTII,S$GLB,, | Performed by: INTERNAL MEDICINE

## 2025-06-09 PROCEDURE — 99999 PR PBB SHADOW E&M-EST. PATIENT-LVL IV: CPT | Mod: PBBFAC,,, | Performed by: INTERNAL MEDICINE

## 2025-06-09 PROCEDURE — 3078F DIAST BP <80 MM HG: CPT | Mod: CPTII,S$GLB,, | Performed by: INTERNAL MEDICINE

## 2025-06-09 PROCEDURE — 1159F MED LIST DOCD IN RCRD: CPT | Mod: CPTII,S$GLB,, | Performed by: INTERNAL MEDICINE

## 2025-06-09 PROCEDURE — 99214 OFFICE O/P EST MOD 30 MIN: CPT | Mod: S$GLB,,, | Performed by: INTERNAL MEDICINE

## 2025-06-09 RX ORDER — ATORVASTATIN CALCIUM 20 MG/1
20 TABLET, FILM COATED ORAL DAILY
Qty: 90 TABLET | Refills: 3 | Status: SHIPPED | OUTPATIENT
Start: 2025-06-09 | End: 2026-06-09

## 2025-06-09 RX ORDER — TAMSULOSIN HYDROCHLORIDE 0.4 MG/1
0.4 CAPSULE ORAL DAILY
Qty: 30 CAPSULE | Refills: 0 | Status: SHIPPED | OUTPATIENT
Start: 2025-06-09

## 2025-06-09 NOTE — PROGRESS NOTES
[facility] Note    Chief Complaint      Chief Complaint   Patient presents with    Follow-up     6 MONTH        History of Present Illness      History of Present Illness    CHIEF COMPLAINT:  Ms. Espinal presents today for follow up after experiencing a kidney stone attack while in the [place]    NEPHROLITHIASIS:  She experienced her first kidney stone attack while in the [place]. She passed the stone between her stay in [place] and return home, confirmed by subsequent imaging. She currently has an asymptomatic tiny stone on the right side. Her only prior renal history includes a kidney infection at age 17.    MUSCULOSKELETAL:  She reports ongoing hip and lower back pain, which is partially relieved with leg elevation at night.    MEDICATIONS:  She has discontinued fish oil supplements due to taste intolerance, venlafaxine, and atorvastatin.      ROS:  Musculoskeletal: +joint pain, +back pain  Psychiatric: -anxiety, -depression       HTN: BP at goal on amlodipine, olmesartan-hydrochlorothiazide    HLD: Controlled off atorvastatin for the past few years.   last check.  Family hx of recent stroke so will restart.  Hx of breast cancer: Sees oncology, Dr. Olivier but released pending any new issues.  Off anastrazole 2024.   Asthma: Has albuterol as needed.  Has not needed rescue since last visit.  Anxiety: Controlled off effexor.  No new or worsening symptoms.    Insomnia: Partially controlled on trazodone.  Doing well on this regimen.     Osteopenia: FRAX does not meet criteria for treatment.  Repeat in 2025.  Flu shot UTD.  TdAP 2017.  Shingles vaccine UTD.  Pneumonia vaccine due age 64.  COVID vaccine UTD.  Mammogram N/A bilateral mastectomy.  DEXA 2023, osteopenia Dr. Boswell.  FIT 12/2024.     Assessment/Plan     Natasha Espinal is a 59 y.o.female with:    Assessment & Plan    Assessed kidney stone management, recommending hydration to flush out small stones and  explained different types of kidney stones (calcium phosphate, uric acid) and the difficulty in tailoring diet without knowing stone composition.  Evaluated hip and lower back pain, considering muscular or arthritic origin given normal previous XR.  Initiated atorvastatin at medium dose due to family history of stroke, despite patient not meeting high-risk criteria based on general population statistics, for cholesterol management and stroke prevention. Discussed the role of statins in reducing plaque build-up and stroke risk.  Reviewed need for DEXA scan. Explained that osteoporosis typically does not cause pain unless there is a fracture.  Considered fish oil supplementation unnecessary if patient has not noticed significant benefits.    KIDNEY STONES:  - Monitored kidney stone status; patient had an attack in the [place] but scan confirms the stone passed between [place] and return.  - Currently has a tiny stone on the right side not causing problems.  - Discussed kidney stones, types, and dietary considerations without knowing stone type.  - Advised to increase water intake to help flush out stones and avoid dehydration.  - Refilled Flomax prescription to keep on hand for potential kidney stone episodes, to be taken for about 1 week if symptoms occur.    LOW BACK PAIN:  - Monitored ongoing lower back discomfort.  - Previous x-ray showed no structural issues.  - Discussed possible muscular or arthritic etiology of pain.  - Recommend consultation with an orthopedist for further evaluation and possible corticosteroid injection.    HIP PAIN:  - Monitored ongoing hip discomfort.  - Previous x-ray showed no structural issues.  - Discussed possible muscular or arthritic etiology of pain.  - Ordered DEXA scan to be conducted in November at [facility].  - Referred to an orthopedist for evaluation, potentially for corticosteroid injection.    HISTORY OF URINARY TRACT INFECTION:  - Noted history of  kidney infection at age 17.    FOLLOW-UP:  - Follow up in 1 year.  - Contact the office if anything changes.         1. Essential hypertension    2. Mixed hyperlipidemia  - atorvastatin (LIPITOR) 20 MG tablet; Take 1 tablet (20 mg total) by mouth once daily.  Dispense: 90 tablet; Refill: 3    3. History of breast cancer    4. Anxiety    5. Impaired fasting glucose    6. Primary insomnia    7. Osteopenia, unspecified location  - DXA Bone Density Axial Skeleton 1 or more sites; Future    8. Postmenopausal  - DXA Bone Density Axial Skeleton 1 or more sites; Future    9. Chronic pain of both hips  - Ambulatory referral/consult to Orthopedics; Future    10. History of kidney stones  - tamsulosin (FLOMAX) 0.4 mg Cap; Take 1 capsule (0.4 mg total) by mouth once daily.  Dispense: 30 capsule; Refill: 0      Chronic conditions status updated as per HPI.  Other than changes above, cont current medications and maintain follow up with specialists.  Follow up in about 6 months (around 12/9/2025) for Annual preventative visit.    Future Appointments   Date Time Provider Department Center   6/17/2025  2:00 PM Conner Holt PA-C [facility] ORTHO [address]   11/14/2025  9:30 AM [facility] DEXA1 [facility] B[facility] Time   12/15/2025  9:45 AM Ta Espinoza MD [facility] PRICRE Time           Past Medical History:  Past Medical History:   Diagnosis Date    Allergy     Asthma     Breast cancer 12/2017    left     Complication of anesthesia May 2018    Hypertension        Past Surgical History:   has a past surgical history that includes Hysterectomy (2011); Oophorectomy; deviated septum repair; Mastectomy (Bilateral, 02/2018); Breast biopsy (Left, 12/2017); Breast biopsy (Left, 1983); Breast reconstruction (Bilateral); scar tissue removal in airway  (Bilateral, 08/01/2018); Knee surgery (Left, 01/06/2020); Sinus surgery; Direct laryngobronchoscopy (N/A, 02/17/2022); Dilation of trachea (02/17/2022); Injection of steroid (02/17/2022); Cosmetic  surgery (2018); Esophagus surgery (2018); and Nasal septum surgery (1998).    Family History:  family history includes Allergies in her brother; Arthritis in her father and mother; Asthma in her brother and mother; Breast cancer (age of onset: 43) in her paternal aunt; Breast cancer (age of onset: 55) in her paternal aunt; Breast cancer (age of onset: 62) in her paternal aunt; COPD in her father; Colon cancer in her paternal uncle; Hearing loss in her father and mother; Heart disease in her father; Heart failure in her father.     Social History:  Social History[1]    Medications:  Encounter Medications[2]    Allergies:  Review of patient's allergies indicates:   Allergen Reactions    Naproxen Itching    Sulfa (sulfonamide antibiotics) Nausea Only       Health Maintenance:  Immunization History   Administered Date(s) Administered    COVID-19, MRNA, LN-S, PF (Pfizer) (Purple Cap) 02/25/2021, 03/18/2021, 11/09/2021    Influenza (FLUBLOK) - Quadrivalent - Recombinant - PF *Preferred* (egg allergy) 10/05/2020    Influenza - Quadrivalent 11/24/2014    Influenza - Quadrivalent - MDCK - PF 10/05/2022, 09/27/2023    Influenza - Quadrivalent - PF *Preferred* (6 months and older) 08/28/2019, 01/22/2020, 10/22/2021    Influenza - Trivalent - Fluarix, Flulaval, Fluzone, Afluria - PF 09/25/2024    Tdap 07/31/2017    Zoster Recombinant 07/29/2024, 12/22/2024      Health Maintenance   Topic Date Due    Pneumococcal Vaccines (Age 50+) (1 of 2 - PCV) Never done    COVID-19 Vaccine (4 - 2024-25 season) 09/01/2024    Hemoglobin A1c (Prediabetes)  11/08/2024    RSV Vaccine (Age 60+ and Pregnant patients) (1 - Risk 60-74 years 1-dose series) Never done    TETANUS VACCINE  07/31/2027    Colorectal Cancer Screening  01/26/2028    Lipid Panel  12/04/2029    Hepatitis C Screening  Completed    Shingles Vaccine  Completed    Influenza Vaccine  Completed    HIV Screening  Completed        Physical Exam      Vital Signs  Pulse: 91  Resp: (!)  "95  BP: 118/70  BP Location: Right arm  Patient Position: Sitting  Pain Score: 0-No pain  Height and Weight  Height: 4' 11" (149.9 cm)  Weight: 79 kg (174 lb 2.6 oz)  BSA (Calculated - sq m): 1.81 sq meters  BMI (Calculated): 35.2  Weight in (lb) to have BMI = 25: 123.5]    Physical Exam    General: No acute distress. Well-developed. Well-nourished.  Eyes: EOMI. Sclerae anicteric.  HENT: Normocephalic. Atraumatic. Nares patent. Moist oral mucosa.  Ears: Bilateral TMs clear. Bilateral EACs clear.  Cardiovascular: Regular rate. Regular rhythm. No murmurs. No rubs. No gallops. Normal S1, S2. No carotid bruits.  Respiratory: Normal respiratory effort. Clear to auscultation bilaterally. No rales. No rhonchi. No wheezing.  Abdomen: Soft. Non-tender. Non-distended. Normoactive bowel sounds.  Musculoskeletal: No  obvious deformity.  Extremities: No lower extremity edema.  Neurological: Alert & oriented x3. No slurred speech. Normal gait.  Psychiatric: Normal mood. Normal affect. Good insight. Good judgment.  Skin: Warm. Dry. No rash.         Physical Exam  Vitals reviewed.   Constitutional:       Appearance: She is well-developed.   HENT:      Head: Normocephalic and atraumatic.      Right Ear: External ear normal.      Left Ear: External ear normal.   Cardiovascular:      Rate and Rhythm: Normal rate and regular rhythm.      Heart sounds: Normal heart sounds. No murmur heard.  Pulmonary:      Effort: Pulmonary effort is normal.      Breath sounds: Normal breath sounds. No wheezing or rales.   Abdominal:      General: Bowel sounds are normal. There is no distension.      Palpations: Abdomen is soft.      Tenderness: There is no abdominal tenderness.         Laboratory:    Results              CBC:  Recent Labs   Lab 10/31/22  0857 11/08/23  0914 12/04/24  0720   WBC 6.60 8.03 8.79   RBC 4.82 4.72 4.74   Hemoglobin 13.6 13.5 13.6   Hematocrit 42.1 41.2 41.3   Platelets 266 322 280   MCV 87 87 87   MCH 28.2 28.6 28.7   MCHC " 32.3 32.8 32.9     CMP:  Recent Labs   Lab 10/31/22  0856 11/08/23  0914 12/04/24  0720   Glucose 112 H 101 106   Calcium 9.6 9.8 10.1   Albumin 4.6 4.3 3.9   Total Protein 7.9 7.5 7.6   Sodium 145 141 144   Potassium 4.0 4.5 3.9   CO2 32 H 28 30 H   Chloride 102 103 104   BUN 19 H 16 19   Alkaline Phosphatase 76 89 72   ALT 50 H 47 H 37   AST 30 37 20   Total Bilirubin 0.3 0.5 0.4     URINALYSIS:  Recent Labs   Lab 03/17/25  1130   Color, UA Yellow   Specific Gravity, UA 1.010   pH, UA 5.0   Protein, UA Negative   Nitrite, UA Negative   Leukocytes, UA Negative      LIPIDS:  Recent Labs   Lab 10/31/22  0856 11/08/23  0914 12/04/24  0720   TSH 1.170 1.070 1.340   HDL 37 L 36 L 47   Cholesterol 195 188 225 H   Triglycerides 257 H 115 231 H   LDL Cholesterol 106.6 129.0 131.8   HDL/Cholesterol Ratio 19.0 L 19.1 L 20.9   Non-HDL Cholesterol 158 152 178   Total Cholesterol/HDL Ratio 5.3 H 5.2 H 4.8     TSH:  Recent Labs   Lab 10/31/22  0856 11/08/23  0914 12/04/24  0720   TSH 1.170 1.070 1.340     A1C:  Recent Labs   Lab 11/08/23  0914   Hemoglobin A1C 5.3           This note was generated with the assistance of ambient listening technology. Verbal consent was obtained by the patient and accompanying visitor(s) for the recording of patient appointment to facilitate this note. I attest to having reviewed and edited the generated note for accuracy, though some syntax or spelling errors may persist. Please contact the author of this note for any clarification.      Ta Espinoza MD  [facility]                       [1]   Social History  Tobacco Use    Smoking status: Never    Smokeless tobacco: Never   Substance Use Topics    Alcohol use: Not Currently    Drug use: Never   [2]   Outpatient Encounter Medications as of 6/9/2025   Medication Sig Dispense Refill    amLODIPine (NORVASC) 10 MG tablet TAKE 1 TABLET BY MOUTH EVERY DAY IN THE EVENING 90 tablet 3    ascorbate calcium 500 mg Tab Take 1 tablet by mouth.       aspirin (ECOTRIN) 81 MG EC tablet Take 81 mg by mouth once daily.      atorvastatin (LIPITOR) 20 MG tablet Take 1 tablet (20 mg total) by mouth once daily. 90 tablet 3    cetirizine (ZYRTEC) 10 MG tablet Take 10 mg by mouth once daily.      cholecalciferol, vitamin D3, (VITAMIN D3) 50 mcg (2,000 unit) Cap Take 1 capsule by mouth once daily.      fluticasone propionate (FLONASE) 50 mcg/actuation nasal spray 1 spray (50 mcg total) by Each Nostril route once daily. 11.1 mL 2    hydroCHLOROthiazide (HYDRODIURIL) 12.5 MG Tab TAKE 1 TABLET BY MOUTH EVERY DAY 90 tablet 3    hydrOXYzine HCL (ATARAX) 25 MG tablet Take 1 tablet (25 mg total) by mouth 3 (three) times daily as needed for Itching. 60 tablet 1    melatonin 5 mg Tab Take by mouth.      multivitamin/iron/folic acid (CENTRUM WOMEN ORAL) Take by mouth.      olmesartan-hydrochlorothiazide (BENICAR HCT) 40-12.5 mg Tab TAKE 1 TABLET BY MOUTH EVERY DAY 90 tablet 3    pantoprazole (PROTONIX) 40 MG tablet TAKE 1 TABLET BY MOUTH EVERY DAY 90 tablet 3    tamsulosin (FLOMAX) 0.4 mg Cap Take 1 capsule (0.4 mg total) by mouth once daily. 30 capsule 0    traZODone (DESYREL) 50 MG tablet TAKE 1 TABLET BY MOUTH NIGHTLY AS NEEDED FOR INSOMNIA. 90 tablet 3    [DISCONTINUED] fish oil-omega-3 fatty acids 300-1,000 mg capsule Take 1 g by mouth.      [DISCONTINUED] tamsulosin (FLOMAX) 0.4 mg Cap Take 1 capsule (0.4 mg total) by mouth once daily. 30 capsule 1     No facility-administered encounter medications on file as of 6/9/2025.

## 2025-06-17 ENCOUNTER — OFFICE VISIT (OUTPATIENT)
Dept: ORTHOPEDICS | Facility: CLINIC | Age: 60
End: 2025-06-17
Payer: COMMERCIAL

## 2025-06-17 ENCOUNTER — HOSPITAL ENCOUNTER (OUTPATIENT)
Dept: RADIOLOGY | Facility: HOSPITAL | Age: 60
Discharge: HOME | End: 2025-06-17
Payer: COMMERCIAL

## 2025-06-17 VITALS — BODY MASS INDEX: 35.16 KG/M2 | HEIGHT: 59 IN | WEIGHT: 174.38 LBS

## 2025-06-17 DIAGNOSIS — M25.552 BILATERAL HIP PAIN: ICD-10-CM

## 2025-06-17 DIAGNOSIS — M54.50 CHRONIC BILATERAL LOW BACK PAIN, UNSPECIFIED WHETHER SCIATICA PRESENT: Primary | ICD-10-CM

## 2025-06-17 DIAGNOSIS — M25.552 CHRONIC PAIN OF BOTH HIPS: ICD-10-CM

## 2025-06-17 DIAGNOSIS — G89.29 CHRONIC PAIN OF BOTH HIPS: ICD-10-CM

## 2025-06-17 DIAGNOSIS — M25.551 CHRONIC PAIN OF BOTH HIPS: ICD-10-CM

## 2025-06-17 DIAGNOSIS — G89.29 CHRONIC BILATERAL LOW BACK PAIN, UNSPECIFIED WHETHER SCIATICA PRESENT: ICD-10-CM

## 2025-06-17 DIAGNOSIS — G89.29 CHRONIC BILATERAL LOW BACK PAIN, UNSPECIFIED WHETHER SCIATICA PRESENT: Primary | ICD-10-CM

## 2025-06-17 DIAGNOSIS — M54.50 CHRONIC BILATERAL LOW BACK PAIN, UNSPECIFIED WHETHER SCIATICA PRESENT: ICD-10-CM

## 2025-06-17 DIAGNOSIS — M25.551 BILATERAL HIP PAIN: ICD-10-CM

## 2025-06-17 PROCEDURE — 72110 X-RAY EXAM L-2 SPINE 4/>VWS: CPT | Mod: TC

## 2025-06-17 PROCEDURE — 73521 X-RAY EXAM HIPS BI 2 VIEWS: CPT | Mod: TC

## 2025-06-17 PROCEDURE — 3008F BODY MASS INDEX DOCD: CPT | Mod: CPTII,S$GLB,,

## 2025-06-17 PROCEDURE — 72110 X-RAY EXAM L-2 SPINE 4/>VWS: CPT | Mod: 26,,, | Performed by: RADIOLOGY

## 2025-06-17 PROCEDURE — 1159F MED LIST DOCD IN RCRD: CPT | Mod: CPTII,S$GLB,,

## 2025-06-17 PROCEDURE — 73521 X-RAY EXAM HIPS BI 2 VIEWS: CPT | Mod: 26,,, | Performed by: RADIOLOGY

## 2025-06-17 PROCEDURE — 99213 OFFICE O/P EST LOW 20 MIN: CPT | Mod: S$GLB,,,

## 2025-06-17 PROCEDURE — 1160F RVW MEDS BY RX/DR IN RCRD: CPT | Mod: CPTII,S$GLB,,

## 2025-06-17 PROCEDURE — 99999 PR PBB SHADOW E&M-EST. PATIENT-LVL V: CPT | Mod: PBBFAC,,,

## 2025-06-17 RX ORDER — METHOCARBAMOL 750 MG/1
750 TABLET, FILM COATED ORAL 4 TIMES DAILY PRN
Qty: 40 TABLET | Refills: 0 | Status: SHIPPED | OUTPATIENT
Start: 2025-06-17 | End: 2025-06-27

## 2025-06-17 NOTE — PROGRESS NOTES
SUBJECTIVE:     Chief Complaint & History of Present Illness:  History of Present Illness    CHIEF COMPLAINT:  - Lower back and hip pain    HPI:  Ms. Espinal presents with lower back and hip pain that started a few years ago. She has a history of breast cancer and was on chemotherapy pills for seven years, during which bone density tests showed bone loss in these areas. Pain alternates between the right and left hip, with the right hip initially being more painful. She describes the pain as deep and a grabbing sensation, particularly in the hip area. Lower back tightness is described as feeling constrained.    Pain is primarily nocturnal, affecting her sleep, but she is asymptomatic during the day unless standing for prolonged periods. She reports that lying down in bed is painful and describes getting up and down from bed as sometimes extremely painful. She uses a pillow under her knees, as suggested by her practitioner last year, which has helped somewhat but has not fully resolved the discomfort. She continues to exercise and walk despite the discomfort.    She denies any specific accidents or injuries related to this pain, numbness, tingling, burning sensation, radiating pain down the legs, weakness in the legs, tripping, urinary incontinence, bowel incontinence, and pain in the groin area. She also denies any history of surgeries on the lower back or hips, chronic kidney disease, and stomach ulcers. She mentions having a kidney stone attack in March, which has since passed.    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  - Involves bending, stooping, and picking up the  child      ROS:  Constitutional: +sleep disturbances, +difficulty falling asleep, +sleep difficulty, +difficulty staying asleep  Genitourinary: -urinary incontinence, -difficulty urinating, -nocturia, -bladder pain  Musculoskeletal: +back pain, +limb pain  Neurological: -weakness          Past Medical History:   Diagnosis Date    Allergy     Asthma     Breast cancer 12/2017    left     Complication of anesthesia May 2018    Hypertension        Past Surgical History:   Procedure Laterality Date    BREAST BIOPSY Left 12/2017    BREAST BIOPSY Left 1983    exc bx    BREAST RECONSTRUCTION Bilateral     02/2018-05/2018 bryon flap    COSMETIC SURGERY  2018    Breast reconstruction    deviated septum repair      DILATION OF TRACHEA  02/17/2022    Procedure: DILATION, TRACHEA;  Surgeon: Mikey Galvez MD;  Location: [facility] OR [address] [place] [address];  Service: ENT;;    DIRECT LARYNGOBRONCHOSCOPY N/A 02/17/2022    Procedure: Suspension microlaryngoscopy, CO2 laser excision, steroid injection, balloon dilation;  Surgeon: Mikey Galvez MD;  Location: [facility] OR [address] [place] [address];  Service: ENT;  Laterality: N/A;  Microscope, telescopes, tower, airway basic, injector, Kenalog, airway balloons, CO2 laser/micromanipulator, rep conf# [id number] IC 2/3.    ESOPHAGUS SURGERY  2018    HYSTERECTOMY  2011    INJECTION OF STEROID  02/17/2022    Procedure: INJECTION, STEROID;  Surgeon: Mikey Galvez MD;  Location: [facility] OR [address] [place] [address];  Service: ENT;;    KNEE SURGERY Left 01/06/2020    MASTECTOMY Bilateral 02/2018    NASAL SEPTUM SURGERY  1998    OOPHORECTOMY      scar tissue removal in airway  Bilateral 08/01/2018    SINUS SURGERY         Family History   Problem Relation Name Age of Onset    Asthma Mother Tracey Satya     Arthritis Mother Tracey Satya     Hearing loss Mother Tracey Satya     Heart disease Father Flavio Andrewplet     COPD Father Flavio Satya     Arthritis Father Flavio Satya     Hearing loss Father Flavio Satya     Heart failure  "Father Flavio Hernadez     Breast cancer Paternal Aunt  43    Breast cancer Paternal Aunt  62    Breast cancer Paternal Aunt  55    Colon cancer Paternal Uncle      Asthma Brother Driss Jay     Allergies Brother Driss Jay     Ovarian cancer Neg Hx      Cancer Neg Hx         Review of patient's allergies indicates:   Allergen Reactions    Naproxen Itching    Sulfa (sulfonamide antibiotics) Nausea Only         Current Medications[1]      OBJECTIVE:     PHYSICAL EXAM:  Ht 4' 11" (1.499 m)   Wt 79.1 kg (174 lb 6.1 oz)   BMI 35.22 kg/m²   General: Pleasant, cooperative, NAD.  HEENT: NCAT, sclera nonicteric.  Lungs: Respirations are equal and unlabored.   Abdomen: Soft and non-tender.  CV: 2+ bilateral upper and lower extremity pulses.  Neuro: Sensation intact to light touch.  Skin: Intact throughout LE with no rashes, erythema, or lesions.  Extremities: No LE edema, NVI lower extremities. normal gait.    bilateral Hip Exam:  TTP: Posterior  120 degrees flexion  0 degrees extension   20 degrees internal rotation  30 degrees external rotation  30 degrees abduction  30 degrees adduction   30 flexion contracture   negative BRADY   negative FADIR  negative Stinchfield    TTP bilateral lumbar paraspinal musculature.     RADIOGRAPHS:  X-rays of the bilateral hips taken today personally reviewed. Imaging reveals no acute fractures or dislocations. Mild arthritic changes.    X-rays of the lumbar spine taken today reveal no acute fractures or dislocations. Overall satisfactory preservation of vertebral body height. Mild multilevel degenerative changes most significant at L5-S1.       ASSESSMENT:       ICD-10-CM ICD-9-CM   1. Chronic bilateral low back pain, unspecified whether sciatica present  M54.50 724.2    G89.29 338.29   2. Chronic pain of both hips  M25.551 719.45    M25.552 338.29    G89.29    3. Bilateral hip pain  M25.551 719.45    M25.552        PLAN:       Assessment & Plan    - Likely lumbar paraspinal muscular strain. "   - Reviewed XRs of the lower back and hips.  - Avoid excessive bending, stooping, and lifting heavy objects without proper form.  - Robaxin up to 4 times daily, spaced out towards night.  - Aleve as needed for pain.  - Referred to PT at [facility] for exercises and manual therapy.          This note was generated with the assistance of ambient listening technology. Verbal consent was obtained by the patient and accompanying visitor(s) for the recording of patient appointment to facilitate this note. I attest to having reviewed and edited the generated note for accuracy, though some syntax or spelling errors may persist. Please contact the author of this note for any clarification.         Conner Holt PA-C         [1]   Current Outpatient Medications:     amLODIPine (NORVASC) 10 MG tablet, TAKE 1 TABLET BY MOUTH EVERY DAY IN THE EVENING, Disp: 90 tablet, Rfl: 3    ascorbate calcium 500 mg Tab, Take 1 tablet by mouth., Disp: , Rfl:     aspirin (ECOTRIN) 81 MG EC tablet, Take 81 mg by mouth once daily., Disp: , Rfl:     atorvastatin (LIPITOR) 20 MG tablet, Take 1 tablet (20 mg total) by mouth once daily., Disp: 90 tablet, Rfl: 3    cetirizine (ZYRTEC) 10 MG tablet, Take 10 mg by mouth once daily., Disp: , Rfl:     cholecalciferol, vitamin D3, (VITAMIN D3) 50 mcg (2,000 unit) Cap, Take 1 capsule by mouth once daily., Disp: , Rfl:     fluticasone propionate (FLONASE) 50 mcg/actuation nasal spray, 1 spray (50 mcg total) by Each Nostril route once daily., Disp: 11.1 mL, Rfl: 2    hydroCHLOROthiazide (HYDRODIURIL) 12.5 MG Tab, TAKE 1 TABLET BY MOUTH EVERY DAY, Disp: 90 tablet, Rfl: 3    hydrOXYzine HCL (ATARAX) 25 MG tablet, Take 1 tablet (25 mg total) by mouth 3 (three) times daily as needed for Itching., Disp: 60 tablet, Rfl: 1    melatonin 5 mg Tab, Take by mouth., Disp: , Rfl:     multivitamin/iron/folic acid (CENTRUM WOMEN ORAL), Take by mouth., Disp: , Rfl:     olmesartan-hydrochlorothiazide (BENICAR HCT)  40-12.5 mg Tab, TAKE 1 TABLET BY MOUTH EVERY DAY, Disp: 90 tablet, Rfl: 3    pantoprazole (PROTONIX) 40 MG tablet, TAKE 1 TABLET BY MOUTH EVERY DAY, Disp: 90 tablet, Rfl: 3    tamsulosin (FLOMAX) 0.4 mg Cap, Take 1 capsule (0.4 mg total) by mouth once daily., Disp: 30 capsule, Rfl: 0    traZODone (DESYREL) 50 MG tablet, TAKE 1 TABLET BY MOUTH NIGHTLY AS NEEDED FOR INSOMNIA., Disp: 90 tablet, Rfl: 3    methocarbamoL (ROBAXIN) 750 MG Tab, Take 1 tablet (750 mg total) by mouth 4 (four) times daily as needed (for muscle spasms)., Disp: 40 tablet, Rfl: 0

## 2025-06-23 PROBLEM — R29.898 DECREASED STRENGTH OF TRUNK AND BACK: Status: ACTIVE | Noted: 2025-06-23

## 2025-06-23 PROBLEM — R29.898 DECREASED STRENGTH OF LOWER EXTREMITY: Status: ACTIVE | Noted: 2025-06-23

## 2025-07-31 DIAGNOSIS — R13.10 DYSPHAGIA, UNSPECIFIED TYPE: ICD-10-CM

## 2025-07-31 RX ORDER — PANTOPRAZOLE SODIUM 40 MG/1
40 TABLET, DELAYED RELEASE ORAL
Qty: 90 TABLET | Refills: 3 | Status: SHIPPED | OUTPATIENT
Start: 2025-07-31

## (undated) DEVICE — CATH AERIS BLLN 55CM 14X40MM

## (undated) DEVICE — KIT ANTIFOG W/SPONG & FLUID

## (undated) DEVICE — DEKA HISCAN SCANNER

## (undated) DEVICE — DEKA CO2 LASER

## (undated) DEVICE — DEKA MICROMANIPULATOR

## (undated) DEVICE — TRAY ENT 4/CS

## (undated) DEVICE — CATH AERIS BLLN 55CM 16X40MM